# Patient Record
Sex: FEMALE | Race: WHITE | Employment: OTHER | URBAN - METROPOLITAN AREA
[De-identification: names, ages, dates, MRNs, and addresses within clinical notes are randomized per-mention and may not be internally consistent; named-entity substitution may affect disease eponyms.]

---

## 2018-03-26 ENCOUNTER — HOSPITAL ENCOUNTER (OUTPATIENT)
Dept: SURGERY | Age: 68
Discharge: HOME | End: 2018-03-26
Payer: MEDICARE

## 2018-03-26 ENCOUNTER — HOSPITAL ENCOUNTER (OUTPATIENT)
Dept: PHYSICAL THERAPY | Age: 68
Discharge: HOME | End: 2018-03-26
Payer: MEDICARE

## 2018-03-26 DIAGNOSIS — R06.83 SNORING: Primary | ICD-10-CM

## 2018-03-26 LAB
ANION GAP SERPL CALC-SCNC: 13 MMOL/L (ref 7–16)
APPEARANCE UR: CLEAR
APTT PPP: 28 SEC (ref 23.2–35.3)
ATRIAL RATE: 57 BPM
BACTERIA SPEC CULT: NORMAL
BASOPHILS # BLD: 0 K/UL (ref 0–0.2)
BASOPHILS NFR BLD: 0 % (ref 0–2)
BILIRUB UR QL: NEGATIVE
BUN SERPL-MCNC: 11 MG/DL (ref 8–23)
CALCIUM SERPL-MCNC: 9.7 MG/DL (ref 8.3–10.4)
CALCULATED P AXIS, ECG09: 41 DEGREES
CALCULATED R AXIS, ECG10: 41 DEGREES
CALCULATED T AXIS, ECG11: 15 DEGREES
CHLORIDE SERPL-SCNC: 107 MMOL/L (ref 98–107)
CO2 SERPL-SCNC: 24 MMOL/L (ref 21–32)
COLOR UR: YELLOW
CREAT SERPL-MCNC: 0.75 MG/DL (ref 0.6–1)
DIAGNOSIS, 93000: NORMAL
DIFFERENTIAL METHOD BLD: ABNORMAL
EOSINOPHIL # BLD: 0.1 K/UL (ref 0–0.8)
EOSINOPHIL NFR BLD: 1 % (ref 0.5–7.8)
ERYTHROCYTE [DISTWIDTH] IN BLOOD BY AUTOMATED COUNT: 12.9 % (ref 11.9–14.6)
GLUCOSE SERPL-MCNC: 84 MG/DL (ref 65–100)
GLUCOSE UR STRIP.AUTO-MCNC: NEGATIVE MG/DL
HCT VFR BLD AUTO: 39.2 % (ref 35.8–46.3)
HGB BLD-MCNC: 13 G/DL (ref 11.7–15.4)
HGB UR QL STRIP: NEGATIVE
IMM GRANULOCYTES # BLD: 0 K/UL (ref 0–0.5)
IMM GRANULOCYTES NFR BLD AUTO: 0 % (ref 0–5)
INR PPP: 0.9
KETONES UR QL STRIP.AUTO: NEGATIVE MG/DL
LEUKOCYTE ESTERASE UR QL STRIP.AUTO: NEGATIVE
LYMPHOCYTES # BLD: 1.5 K/UL (ref 0.5–4.6)
LYMPHOCYTES NFR BLD: 33 % (ref 13–44)
MCH RBC QN AUTO: 29.4 PG (ref 26.1–32.9)
MCHC RBC AUTO-ENTMCNC: 33.2 G/DL (ref 31.4–35)
MCV RBC AUTO: 88.7 FL (ref 79.6–97.8)
MONOCYTES # BLD: 0.3 K/UL (ref 0.1–1.3)
MONOCYTES NFR BLD: 7 % (ref 4–12)
NEUTS SEG # BLD: 2.6 K/UL (ref 1.7–8.2)
NEUTS SEG NFR BLD: 59 % (ref 43–78)
NITRITE UR QL STRIP.AUTO: NEGATIVE
P-R INTERVAL, ECG05: 190 MS
PH UR STRIP: 5.5 [PH] (ref 5–9)
PLATELET # BLD AUTO: 202 K/UL (ref 150–450)
PMV BLD AUTO: 10.3 FL (ref 10.8–14.1)
POTASSIUM SERPL-SCNC: 3.8 MMOL/L (ref 3.5–5.1)
PROT UR STRIP-MCNC: NEGATIVE MG/DL
PROTHROMBIN TIME: 12.5 SEC (ref 11.5–14.5)
Q-T INTERVAL, ECG07: 410 MS
QRS DURATION, ECG06: 74 MS
QTC CALCULATION (BEZET), ECG08: 399 MS
RBC # BLD AUTO: 4.42 M/UL (ref 4.05–5.25)
SERVICE CMNT-IMP: NORMAL
SODIUM SERPL-SCNC: 144 MMOL/L (ref 136–145)
SP GR UR REFRACTOMETRY: 1 (ref 1–1.02)
UROBILINOGEN UR QL STRIP.AUTO: 0.2 EU/DL (ref 0.2–1)
VENTRICULAR RATE, ECG03: 57 BPM
WBC # BLD AUTO: 4.5 K/UL (ref 4.3–11.1)

## 2018-03-26 PROCEDURE — 80048 BASIC METABOLIC PNL TOTAL CA: CPT | Performed by: PHYSICIAN ASSISTANT

## 2018-03-26 PROCEDURE — 97161 PT EVAL LOW COMPLEX 20 MIN: CPT

## 2018-03-26 PROCEDURE — 77030027138 HC INCENT SPIROMETER -A

## 2018-03-26 PROCEDURE — G8978 MOBILITY CURRENT STATUS: HCPCS

## 2018-03-26 PROCEDURE — 93005 ELECTROCARDIOGRAM TRACING: CPT | Performed by: ANESTHESIOLOGY

## 2018-03-26 PROCEDURE — 36415 COLL VENOUS BLD VENIPUNCTURE: CPT | Performed by: PHYSICIAN ASSISTANT

## 2018-03-26 PROCEDURE — 85610 PROTHROMBIN TIME: CPT | Performed by: PHYSICIAN ASSISTANT

## 2018-03-26 PROCEDURE — G8979 MOBILITY GOAL STATUS: HCPCS

## 2018-03-26 PROCEDURE — G8980 MOBILITY D/C STATUS: HCPCS

## 2018-03-26 PROCEDURE — 87641 MR-STAPH DNA AMP PROBE: CPT | Performed by: PHYSICIAN ASSISTANT

## 2018-03-26 PROCEDURE — 81003 URINALYSIS AUTO W/O SCOPE: CPT | Performed by: PHYSICIAN ASSISTANT

## 2018-03-26 PROCEDURE — 85025 COMPLETE CBC W/AUTO DIFF WBC: CPT | Performed by: PHYSICIAN ASSISTANT

## 2018-03-26 PROCEDURE — 85730 THROMBOPLASTIN TIME PARTIAL: CPT | Performed by: PHYSICIAN ASSISTANT

## 2018-03-26 RX ORDER — ERGOCALCIFEROL 1.25 MG/1
50000 CAPSULE ORAL
COMMUNITY
End: 2018-06-22 | Stop reason: CLARIF

## 2018-03-26 RX ORDER — FERROUS SULFATE, DRIED 160(50) MG
1 TABLET, EXTENDED RELEASE ORAL DAILY
COMMUNITY

## 2018-03-26 RX ORDER — ASPIRIN 81 MG/1
81 TABLET ORAL DAILY
COMMUNITY
End: 2018-04-16

## 2018-03-26 RX ORDER — LANOLIN ALCOHOL/MO/W.PET/CERES
400 CREAM (GRAM) TOPICAL DAILY
COMMUNITY
End: 2018-06-22 | Stop reason: CLARIF

## 2018-03-26 RX ORDER — TRAMADOL HYDROCHLORIDE 50 MG/1
50 TABLET ORAL
COMMUNITY
Start: 2018-02-19 | End: 2018-04-16

## 2018-03-26 NOTE — PERIOP NOTES
Recent Results (from the past 12 hour(s))   CBC WITH AUTOMATED DIFF    Collection Time: 03/26/18  9:10 AM   Result Value Ref Range    WBC 4.5 4.3 - 11.1 K/uL    RBC 4.42 4.05 - 5.25 M/uL    HGB 13.0 11.7 - 15.4 g/dL    HCT 39.2 35.8 - 46.3 %    MCV 88.7 79.6 - 97.8 FL    MCH 29.4 26.1 - 32.9 PG    MCHC 33.2 31.4 - 35.0 g/dL    RDW 12.9 11.9 - 14.6 %    PLATELET 630 170 - 868 K/uL    MPV 10.3 (L) 10.8 - 14.1 FL    DF AUTOMATED      NEUTROPHILS 59 43 - 78 %    LYMPHOCYTES 33 13 - 44 %    MONOCYTES 7 4.0 - 12.0 %    EOSINOPHILS 1 0.5 - 7.8 %    BASOPHILS 0 0.0 - 2.0 %    IMMATURE GRANULOCYTES 0 0.0 - 5.0 %    ABS. NEUTROPHILS 2.6 1.7 - 8.2 K/UL    ABS. LYMPHOCYTES 1.5 0.5 - 4.6 K/UL    ABS. MONOCYTES 0.3 0.1 - 1.3 K/UL    ABS. EOSINOPHILS 0.1 0.0 - 0.8 K/UL    ABS. BASOPHILS 0.0 0.0 - 0.2 K/UL    ABS. IMM.  GRANS. 0.0 0.0 - 0.5 K/UL   PROTHROMBIN TIME + INR    Collection Time: 03/26/18  9:10 AM   Result Value Ref Range    Prothrombin time 12.5 11.5 - 14.5 sec    INR 0.9     PTT    Collection Time: 03/26/18  9:10 AM   Result Value Ref Range    aPTT 28.0 23.2 - 64.6 SEC   METABOLIC PANEL, BASIC    Collection Time: 03/26/18  9:10 AM   Result Value Ref Range    Sodium 144 136 - 145 mmol/L    Potassium 3.8 3.5 - 5.1 mmol/L    Chloride 107 98 - 107 mmol/L    CO2 24 21 - 32 mmol/L    Anion gap 13 7 - 16 mmol/L    Glucose 84 65 - 100 mg/dL    BUN 11 8 - 23 MG/DL    Creatinine 0.75 0.6 - 1.0 MG/DL    GFR est AA >60 >60 ml/min/1.73m2    GFR est non-AA >60 >60 ml/min/1.73m2    Calcium 9.7 8.3 - 10.4 MG/DL   URINALYSIS W/ RFLX MICROSCOPIC    Collection Time: 03/26/18  9:10 AM   Result Value Ref Range    Color YELLOW      Appearance CLEAR      Specific gravity 1.005 1.001 - 1.023      pH (UA) 5.5 5.0 - 9.0      Protein NEGATIVE  NEG mg/dL    Glucose NEGATIVE  mg/dL    Ketone NEGATIVE  NEG mg/dL    Bilirubin NEGATIVE  NEG      Blood NEGATIVE  NEG      Urobilinogen 0.2 0.2 - 1.0 EU/dL    Nitrites NEGATIVE  NEG      Leukocyte Esterase NEGATIVE  NEG

## 2018-03-26 NOTE — ADVANCED PRACTICE NURSE
Total Joint Surgery Preoperative Chart Review      Patient ID:  Jennifer Tavarez  [id number]  78 y.o.  1950  Surgeon: Dr. Yousif Sin  Date of Surgery: 4/13/2018  Procedure: Total Right Knee Arthroplasty  Primary Care Physician: Nona Farrell -[phone]  Specialty Physician(s):      Subjective:   Jennifer Tavarez is a 76 y.o. WHITE OR  female who presents for preoperative evaluation for Total Right Knee arthroplasty. This is a preoperative chart review note based on data collected by the nurse at the surgical Pre-Assessment visit. Past Medical History:   Diagnosis Date    Environmental and seasonal allergies     GERD (gastroesophageal reflux disease)     diet controlled     Neuropathy     bilateral lower extremities     Osteoporosis     Primary osteoarthritis of right knee       Past Surgical History:   Procedure Laterality Date    HX COLONOSCOPY  2012    HX TONSILLECTOMY  1962    HX TUBAL LIGATION  1978     Family History   Problem Relation Age of Onset    Alzheimer Mother     Cancer Father       Social History   Substance Use Topics    Smoking status: Never Smoker    Smokeless tobacco: Never Used    Alcohol use No       Prior to Admission medications    Medication Sig Start Date End Date Taking? Authorizing Provider   ergocalciferol (ERGOCALCIFEROL) 50,000 unit capsule Take 50,000 Units by mouth. Twice a week   Yes Historical Provider   aspirin delayed-release 81 mg tablet Take 81 mg by mouth daily. Yes Historical Provider   traMADol (ULTRAM) 50 mg tablet Take 50 mg by mouth every six (6) hours as needed. 2/19/18   Historical Provider   calcium-vitamin D (OYSTER SHELL) 500 mg(1,250mg) -200 unit per tablet Take 1 Tab by mouth daily. Historical Provider   magnesium oxide (MAG-OX) 400 mg tablet Take 400 mg by mouth daily.     Historical Provider     No Known Allergies       Objective:     Physical Exam:   Patient Vitals for the past 24 hrs:   Temp Pulse Resp BP SpO2   03/26/18 1048 96.8 °F (36 °C) 66 18 142/73 100 %       ECG:    EKG Results     Procedure 720 Value Units Date/Time    EKG, 12 LEAD, INITIAL [[id number]] Collected:  03/26/18 0934    Order Status:  Completed Updated:  03/26/18 1104     Ventricular Rate 57 BPM      Atrial Rate 57 BPM      P-R Interval 190 ms      QRS Duration 74 ms      Q-T Interval 410 ms      QTC Calculation (Bezet) 399 ms      Calculated P Axis 41 degrees      Calculated R Axis 41 degrees      Calculated T Axis 15 degrees      Diagnosis --     Sinus bradycardia with Fusion complexes  Otherwise normal ECG  No previous ECGs available            Data Review:   Labs:   Recent Results (from the past 24 hour(s))   CBC WITH AUTOMATED DIFF    Collection Time: 03/26/18  9:10 AM   Result Value Ref Range    WBC 4.5 4.3 - 11.1 K/uL    RBC 4.42 4.05 - 5.25 M/uL    HGB 13.0 11.7 - 15.4 g/dL    HCT 39.2 35.8 - 46.3 %    MCV 88.7 79.6 - 97.8 FL    MCH 29.4 26.1 - 32.9 PG    MCHC 33.2 31.4 - 35.0 g/dL    RDW 12.9 11.9 - 14.6 %    PLATELET 616 343 - 543 K/uL    MPV 10.3 (L) 10.8 - 14.1 FL    DF AUTOMATED      NEUTROPHILS 59 43 - 78 %    LYMPHOCYTES 33 13 - 44 %    MONOCYTES 7 4.0 - 12.0 %    EOSINOPHILS 1 0.5 - 7.8 %    BASOPHILS 0 0.0 - 2.0 %    IMMATURE GRANULOCYTES 0 0.0 - 5.0 %    ABS. NEUTROPHILS 2.6 1.7 - 8.2 K/UL    ABS. LYMPHOCYTES 1.5 0.5 - 4.6 K/UL    ABS. MONOCYTES 0.3 0.1 - 1.3 K/UL    ABS. EOSINOPHILS 0.1 0.0 - 0.8 K/UL    ABS. BASOPHILS 0.0 0.0 - 0.2 K/UL    ABS. IMM.  GRANS. 0.0 0.0 - 0.5 K/UL   PROTHROMBIN TIME + INR    Collection Time: 03/26/18  9:10 AM   Result Value Ref Range    Prothrombin time 12.5 11.5 - 14.5 sec    INR 0.9     PTT    Collection Time: 03/26/18  9:10 AM   Result Value Ref Range    aPTT 28.0 23.2 - 37.6 SEC   METABOLIC PANEL, BASIC    Collection Time: 03/26/18  9:10 AM   Result Value Ref Range    Sodium 144 136 - 145 mmol/L    Potassium 3.8 3.5 - 5.1 mmol/L    Chloride 107 98 - 107 mmol/L    CO2 24 21 - 32 mmol/L    Anion gap 13 7 - 16 mmol/L    Glucose 84 65 - 100 mg/dL    BUN 11 8 - 23 MG/DL    Creatinine 0.75 0.6 - 1.0 MG/DL    GFR est AA >60 >60 ml/min/1.73m2    GFR est non-AA >60 >60 ml/min/1.73m2    Calcium 9.7 8.3 - 10.4 MG/DL   URINALYSIS W/ RFLX MICROSCOPIC    Collection Time: 03/26/18  9:10 AM   Result Value Ref Range    Color YELLOW      Appearance CLEAR      Specific gravity 1.005 1.001 - 1.023      pH (UA) 5.5 5.0 - 9.0      Protein NEGATIVE  NEG mg/dL    Glucose NEGATIVE  mg/dL    Ketone NEGATIVE  NEG mg/dL    Bilirubin NEGATIVE  NEG      Blood NEGATIVE  NEG      Urobilinogen 0.2 0.2 - 1.0 EU/dL    Nitrites NEGATIVE  NEG      Leukocyte Esterase NEGATIVE  NEG     EKG, 12 LEAD, INITIAL    Collection Time: 03/26/18  9:34 AM   Result Value Ref Range    Ventricular Rate 57 BPM    Atrial Rate 57 BPM    P-R Interval 190 ms    QRS Duration 74 ms    Q-T Interval 410 ms    QTC Calculation (Bezet) 399 ms    Calculated P Axis 41 degrees    Calculated R Axis 41 degrees    Calculated T Axis 15 degrees    Diagnosis       Sinus bradycardia with Fusion complexes  Otherwise normal ECG  No previous ECGs available           Problem List:  )  Patient Active Problem List   Diagnosis Code    Snoring R06.83       Total Joint Surgery Pre-Assessment Recommendations:           Patient with multiple comorbidities including:  Advanced age of 76 with severe neuorpathy of bilateral lower extremities. Patient with poor home support and wishes to discharge to skilled nursing. Patient would benefit from inpatient hospitalization with total knee surgery. Patient reports the symptoms of snoring, observed apnea and /or excessive daytime sleepiness. Will refer patient for HST based on above assessment.          Signed By: BROOKE Poon    March 26, 2018

## 2018-03-26 NOTE — PERIOP NOTES
Labs dated 3/26/18 routed via [address] [facility] to patients PCP, Dr. Thanh Jackson, per Dr. Vladimir Mcnamara' request.

## 2018-03-26 NOTE — PERIOP NOTES
Patient verified name, , and surgery as listed in [facility]. Type 3 surgery, PAT joint assessment complete. Labs per surgeon: cbc, bmp, ua, pt/inr, ptt, mrsa/mssa swab, T&S DOS; results within anesthesia limits. Labs per anesthesia protocol: All required lab work included in surgeon's orders. EKG: completed 3/26/18; results within anesthesia limits. Hibiclens and instructions to return bottle on DOS given per hospital policy. Nasal Swab collected per MD order and instructions for Mupirocin nasal ointment if required. Patient provided with handouts including Guide to Surgery, Pain Management, Hand Hygiene, Blood Transfusion Education, and [facility] Brochure. Patient answered medical/surgical history questions at their best of ability. All prior to admission medications documented in [facility]. Original medication prescription bottle NOT visualized during patient appointment. Patient instructed to hold all vitamins 7 days prior to surgery and NSAIDS 5 days prior to surgery. Medications to be held: all vitamins/supplements/herbals. Patient instructed to continue previous medications as prescribed prior to surgery and to take the following medications the day of surgery according to anesthesia guidelines with a small sip of water: Tramadol if needed and 81 mg ASA. Patient instructed to bring bottle of Hibiclens and incentive spirometer to the hospital on the DOS. Patient teach back successful and patient demonstrates knowledge of instruction.

## 2018-03-26 NOTE — PROGRESS NOTES
Isabel Mccoy  : (89 y.o.) [address] at [place] [address], [address] [facility]  [phone]       Physical Therapy Prehab Plan of Treatment and Evaluation Summary:3/26/2018    ICD-10: Treatment Diagnosis:   · Pain in Right Knee (M25.561)  · Stiffness of Right Knee, Not elsewhere classified (M25.661)  · Difficulty in walking, Not elsewhere classified (R26.2)  Precautions/Allergies:   Review of patient's allergies indicates no known allergies. MEDICAL/REFERRING DIAGNOSIS:  Unilateral primary osteoarthritis, right knee [M17.11]  REFERRING PHYSICIAN: Tristan Nava., *  DATE OF SURGERY: 18   Assessment:   Comments:  Pt. Plans to go to snf. She was give snf sheet to call facility. She is aware MD wants her to go home but she is concerned about her  being able to assist her as she reports he is weak from health issues. PROBLEM LIST (Impacting functional limitations):  Ms. Arti Noel presents with the following right lower extremity(s) problems:  1. Strength  2. Range of Motion  3. Home Exercise Program  4. Pain   INTERVENTIONS PLANNED:  1. Home Exercise Program  2. Educational Discussion     TREATMENT PLAN: Effective Dates: 3/26/2018 TO 3/26/2018. Frequency/Duration: Patient to continue to perform home exercise program at least twice per day up until her surgery. GOALS: (Goals have been discussed and agreed upon with patient.)  Discharge Goals: Time Frame: 1 Day  1. Patient will demonstrate independence with a home exercise program designed to increase strength, range of motion and pain control to minimize functional deficits and optimize patient for total joint replacement. Rehabilitation Potential For Stated Goals: Good  Regarding Israel Bauer therapy, I certify that the treatment plan above will be carried out by a therapist or under their direction.   Thank you for this referral,  Mamta Goode PT               HISTORY:   Present Symptoms:  Pain Intensity 1:  (7 at worst)  Pain Location 1: Knee   History of Present Injury/Illness (Reason for Referral):  Medical/Referring Diagnosis: Unilateral primary osteoarthritis, right knee [M17.11]   Past Medical History/Comorbidities:   Ms. Marco A Esparza  has a past medical history of Environmental and seasonal allergies; GERD (gastroesophageal reflux disease); Neuropathy; Osteoporosis; and Primary osteoarthritis of right knee. Ms. Marco A Esparza  has a past surgical history that includes hx tonsillectomy (1962); hx colonoscopy (2012); and hx tubal ligation (1978). Social History/Living Environment:   Home Environment: Private residence  # Steps to Enter: 3  Rails to Enter: No  One/Two Story Residence: One story  Living Alone: No  Support Systems: Spouse/Significant Other/Partner  Patient Expects to be Discharged to[de-identified] Skilled nursing facility  Current DME Used/Available at Home: Shower chair, Crutches, Grab bars  Tub or Shower Type: Shower  Work/Activity:  retired  Dominant Side:  RIGHT  Current Medications:  See [address] note   Number of Personal Factors/Comorbidities that affect the Plan of Care: 1-2: MODERATE COMPLEXITY   EXAMINATION:   ADLs (Current Functional Status):   Ambulation:  [x] Independent  [] Walk Indoors Only  [] Walk Outdoors  [] Use Assistive Device  [] Use Wheelchair Only Dressing:  [x] Independent  Requires Assistance from Someone for:  [] Sock/Shoes  [] Pants  [] Everything   Bathing/Showering:   [x] Independent  [] Requires Assistance from Someone  [] [address]:  [x] Routine house and yard work  [] Light Housework Only  [] None   Observation/Orthostatic Postural Assessment:       ROM/Flexibility:   Gross Assessment: Yes  AROM: Within functional limits (left LE)                       RLE Assessment  RLE Assessment (WDL): Exceptions to WDL  RLE AROM  R Knee Flexion: 100  R Knee Extension: 10   Strength:   Gross Assessment: Yes  Strength:  Within functional limits (left LE)              RLE Strength  R Knee Flexion: 4  R Knee Extension: 4   Functional Mobility:    Gross Assessment: Yes    Gait Description (WDL): Exceptions to WDL  Stand to Sit: Independent, Additional time  Sit to Stand: Independent, Additional time  Distance (ft): 250 Feet (ft)  Ambulation - Level of Assistance: Independent  Speed/Michela: Delayed  Stance: Right decreased  Gait Abnormalities: Antalgic          Balance:    Sitting: Intact  Standing: Intact   Body Structures Involved:  1. Bones  2. Joints  3. Muscles  4. Ligaments Body Functions Affected:  1. Movement Related Activities and Participation Affected:  1. Mobility   Number of elements that affect the Plan of Care: 3: MODERATE COMPLEXITY   CLINICAL PRESENTATION:   Presentation: Stable and uncomplicated: LOW COMPLEXITY   CLINICAL DECISION MAKING:   Outcome Measure: Tool Used: Lower Extremity Functional Scale (LEFS)  Score:  Initial: 38/80 Most Recent: X/80 (Date: -- )   Interpretation of Score: 20 questions each scored on a 5 point scale with 0 representing \"extreme difficulty or unable to perform\" and 4 representing \"no difficulty\". The lower the score, the greater the functional disability. 80/80 represents no disability. Minimal detectable change is 9 points. Score 80 79-65 64-49 48-33 32-17 16-1 0   Modifier CH CI CJ CK CL CM CN     ? Mobility - Walking and Moving Around:     - CURRENT STATUS: CK - 40%-59% impaired, limited or restricted    - GOAL STATUS: CK - 40%-59% impaired, limited or restricted    - D/C STATUS:  CK - 40%-59% impaired, limited or restricted  Medical Necessity:   · Ms. [place] Patel is expected to optimize her lower extremity strength and ROM in preparation for joint replacement surgery. Reason for Services/Other Comments:  · Achieve baseline assesment of musculoskeletal system, functional mobility and home environment. , educate in PT HEP in preparation for surgery, educate in hospital plan of care.    Use of outcome tool(s) and clinical judgement create a POC that gives a: Clear prediction of patient's progress: LOW COMPLEXITY   TREATMENT:   Treatment/Session Assessment:  Patient was instructed in PT- HEP to increase strength and ROM in LEs. Answered all questions. · Post session pain:  No complaints  · Compliance with Program/Exercises: compliant most of the time.   Total Treatment Duration:  PT Patient Time In/Time Out  Time In: 0900  Time Out: [address] [facility],

## 2018-03-27 VITALS
RESPIRATION RATE: 18 BRPM | OXYGEN SATURATION: 100 % | DIASTOLIC BLOOD PRESSURE: 73 MMHG | HEIGHT: 63 IN | WEIGHT: 143 LBS | SYSTOLIC BLOOD PRESSURE: 142 MMHG | TEMPERATURE: 96.8 F | HEART RATE: 66 BPM | BODY MASS INDEX: 25.34 KG/M2

## 2018-03-27 NOTE — PROGRESS NOTES
03/26/18 0900   Oxygen Therapy   O2 Sat (%) 97 %   Pulse via Oximetry 81 beats per minute   O2 Device Room air   Pre-Treatment   Breath Sounds Bilateral Clear   Pre FEV1 (liters) 1.7 liters   % Predicted 74   Incentive Spirometry Treatment   Actual Volume (ml) 1500 ml   Sleep Disorder Breathing Screen:     Patient reports symptoms of:   · Snoring \"like a freight train\" according to   · Excessive daytime sleepiness   · SACS 3  · Waking from sleep snoring  · STOP-BANG _3___  · Crane Score _12___  · Height__5'3\"___ Weight__143 lbs___   PT'S SON DIAGNOSED WITH ELIZABETH    Sleepiness Scale:     Sitting and reading 1    Watching TV 2    Sitting inactive in a public place 2    As a passenger in a car for an hour without a break 2    Lying down to rest in the afternoon when circumstances Permits 2    Sitting and talking to someone 1    Sitting quietly after lunch without alcohol 2    In a car, while stopped for a few minutes 0    Total :  12    Refer patient for sleep study based on above assessment. Initial respiratory Assessment completed with pt. Pt was interviewed and evaluated in Joint camp prior to surgery. Patient ID:  Jenae Garrido  [id number]  61 y.o.  1950  Surgeon: Dr. Lizbeth Reese  Date of Surgery: 4/13/2018  Procedure: Total Right Knee Arthroplasty  Primary Care Physician: Navdeep Concepcion -[phone]  Specialists:                                  Pt instructed in the use of Incentive Spirometry. Pt instructed to bring Incentive Spirometer back on date of surgery & to start using Is upon return to pt room.     Pt taught proper cough technique    History of smoking:   NONE                                                                      Secondhand smoke:NONE      Past procedures with Oxygen desaturation:NONE    Past Medical History:   Diagnosis Date    Environmental and seasonal allergies     GERD (gastroesophageal reflux disease)     diet controlled     Neuropathy     bilateral lower extremities     Osteoporosis     Primary osteoarthritis of right knee                                                                                                                                   HX OF PNA ONCE  Respiratory history:DENIES SOB                                                              Respiratory meds:                                         FAMILY PRESENT:                                                          NO                                        PAST SLEEP STUDY:                        NO  HX OF ELIZABETH:                                            NO                                     ELIZABETH assessment:                                               SLEEPS ON SIDE                                                               PHYSICAL EXAM   Body mass index is 25.33 kg/(m^2). Visit Vitals    /73 (BP 1 Location: Left arm, BP Patient Position: At rest;Sitting)    Pulse 66    Temp 96.8 °F (36 °C)    Resp 18    Ht 5' 3\" (1.6 m)    Wt 64.9 kg (143 lb)    SpO2 100%    BMI 25.33 kg/m2     Neck circumference:35      cm    Loud snoring:        YES                              Witnessed apnea or wakening gasping or choking:,             DENIES,                                                                                                    Awakens with headaches:                                                  DENIES    Morning or daytime tiredness/ sleepiness:                                                                                                            TIRED   Dry mouth or sore throat in morning:                YES                                                                          [place] stage:  4    SACS score:3  STOP/BANG:3                              CPAP:                       NONE                                                     CONT SAT HS              Referrals:  HST  Pt.  Phone Number:  [phone]

## 2018-04-10 NOTE — H&P
[address] [facility]  Pre Operative History and Physical Exam    Patient ID:  Sade Ely  [id number]  44 y.o.  1950    Today: April 10, 2018       Assessment:   1. Arthritis of the right knee        Plan:    1. Proceed with scheduled Procedure(s) (LRB):  RIGHT KNEE ARTHROPLASTY TOTAL / [place] / FNB (Right)            CC:  Right knee pain    HPI:   The patient has end stage arthritis of the right knee. The patient was evaluated and examined during a consultation prior to this office visit. There have been no changes to the patient's orthopedic condition since the initial consultation. The patient has failed previous conservative treatment for this condition including antiinflammatories , and lifestyle modifications. The necessity for joint replacement is present. The patient will be admitted the day of surgery for Procedure(s) (LRB):  RIGHT KNEE ARTHROPLASTY TOTAL / David Olden / FNB (Right)      Past Medical/Surgical History:  Past Medical History:   Diagnosis Date    Environmental and seasonal allergies     GERD (gastroesophageal reflux disease)     diet controlled     Neuropathy     bilateral lower extremities     Osteoporosis     Primary osteoarthritis of right knee      Past Surgical History:   Procedure Laterality Date    HX COLONOSCOPY  2012    HX TONSILLECTOMY  1962    HX TUBAL LIGATION  1978        Allergies: No Known Allergies     Physical Exam:   General: NAD, Alert, Oriented, Appears their stated age     [de-identified]: NC/AT, PERRL    Skin: No rashes, lesions or wounds seen      Psych: normal affect      Heart: Regular Rate, Rhythm     Lungs: unlabored respirations, normal breath sounds     Abdomen: Soft and non-distended     Ortho: Pain with limited ROM of the right knee    Neuro: no focal defects, sensation is equal bilaterally     Lymph: no lymphadenopathy     Meds:   No current facility-administered medications for this encounter.       Current Outpatient Prescriptions   Medication Sig    traMADol (ULTRAM) 50 mg tablet Take 50 mg by mouth every six (6) hours as needed.  ergocalciferol (ERGOCALCIFEROL) 50,000 unit capsule Take 50,000 Units by mouth. Twice a week    calcium-vitamin D (OYSTER SHELL) 500 mg(1,250mg) -200 unit per tablet Take 1 Tab by mouth daily.  magnesium oxide (MAG-OX) 400 mg tablet Take 400 mg by mouth daily.  aspirin delayed-release 81 mg tablet Take 81 mg by mouth daily. Labs:  Hospital Outpatient Visit on 03/26/2018   Component Date Value Ref Range Status    WBC 03/26/2018 4.5  4.3 - 11.1 K/uL Final    RBC 03/26/2018 4.42  4.05 - 5.25 M/uL Final    HGB 03/26/2018 13.0  11.7 - 15.4 g/dL Final    HCT 03/26/2018 39.2  35.8 - 46.3 % Final    MCV 03/26/2018 88.7  79.6 - 97.8 FL Final    MCH 03/26/2018 29.4  26.1 - 32.9 PG Final    MCHC 03/26/2018 33.2  31.4 - 35.0 g/dL Final    RDW 03/26/2018 12.9  11.9 - 14.6 % Final    PLATELET 75/23/2688 081  150 - 450 K/uL Final    MPV 03/26/2018 10.3* 10.8 - 14.1 FL Final    DF 03/26/2018 AUTOMATED    Final    NEUTROPHILS 03/26/2018 59  43 - 78 % Final    LYMPHOCYTES 03/26/2018 33  13 - 44 % Final    MONOCYTES 03/26/2018 7  4.0 - 12.0 % Final    EOSINOPHILS 03/26/2018 1  0.5 - 7.8 % Final    BASOPHILS 03/26/2018 0  0.0 - 2.0 % Final    IMMATURE GRANULOCYTES 03/26/2018 0  0.0 - 5.0 % Final    ABS. NEUTROPHILS 03/26/2018 2.6  1.7 - 8.2 K/UL Final    ABS. LYMPHOCYTES 03/26/2018 1.5  0.5 - 4.6 K/UL Final    ABS. MONOCYTES 03/26/2018 0.3  0.1 - 1.3 K/UL Final    ABS. EOSINOPHILS 03/26/2018 0.1  0.0 - 0.8 K/UL Final    ABS. BASOPHILS 03/26/2018 0.0  0.0 - 0.2 K/UL Final    ABS. IMM.  GRANS. 03/26/2018 0.0  0.0 - 0.5 K/UL Final    Prothrombin time 03/26/2018 12.5  11.5 - 14.5 sec Final    ** Note new reference range and method **    INR 03/26/2018 0.9    Final    Comment: Suggested therapeutic INR range:  Venous thrombosis and embolus  2.0-3.0  Prosthetic heart valve         2.5-3.5  ** Note new reference range and method **      aPTT 03/26/2018 28.0  23.2 - 35.3 SEC Final    Comment: Heparin Therapeutic Range = 74 - 123 seconds  In addition to factor deficiency, monitoring heparin therapy, etc., evaluation of a prolonged aPTT result should include consideration of preanalytic variables such as heparin flush contamination, specimen integrity issues, etc.  ** Note new reference range and method **      Sodium 03/26/2018 144  136 - 145 mmol/L Final    Potassium 03/26/2018 3.8  3.5 - 5.1 mmol/L Final    Chloride 03/26/2018 107  98 - 107 mmol/L Final    CO2 03/26/2018 24  21 - 32 mmol/L Final    Anion gap 03/26/2018 13  7 - 16 mmol/L Final    Glucose 03/26/2018 84  65 - 100 mg/dL Final    Comment: 47 - 60 mg/dl Consistent with, but not fully diagnostic of hypoglycemia. 101 - 125 mg/dl Impaired fasting glucose/consistent with pre-diabetes mellitus  > 126 mg/dl Fasting glucose consistent with overt diabetes mellitus      BUN 03/26/2018 11  8 - 23 MG/DL Final    Creatinine 03/26/2018 0.75  0.6 - 1.0 MG/DL Final    GFR est AA 03/26/2018 >60  >60 ml/min/1.73m2 Final    GFR est non-AA 03/26/2018 >60  >60 ml/min/1.73m2 Final    Comment: (NOTE)  Estimated GFR is calculated using the Modification of Diet in Renal   Disease (MDRD) Study equation, reported for both  Americans   (GFRAA) and non- Americans (GFRNA), and normalized to 1.73m2   body surface area. The physician must decide which value applies to   the patient. The MDRD study equation should only be used in   individuals age 25 or older. It has not been validated for the   following: pregnant women, patients with serious comorbid conditions,   or on certain medications, or persons with extremes of body size,   muscle mass, or nutritional status.       Calcium 03/26/2018 9.7  8.3 - 10.4 MG/DL Final    Color 03/26/2018 YELLOW    Final    Appearance 03/26/2018 CLEAR    Final    Specific gravity 03/26/2018 1.005  1.001 - 1.023   Final    pH (UA) 03/26/2018 5.5  5.0 - 9.0   Final    Protein 03/26/2018 NEGATIVE   NEG mg/dL Final    Glucose 03/26/2018 NEGATIVE   mg/dL Final    Ketone 03/26/2018 NEGATIVE   NEG mg/dL Final    Bilirubin 03/26/2018 NEGATIVE   NEG   Final    Blood 03/26/2018 NEGATIVE   NEG   Final    Urobilinogen 03/26/2018 0.2  0.2 - 1.0 EU/dL Final    Nitrites 03/26/2018 NEGATIVE   NEG   Final    Leukocyte Esterase 03/26/2018 NEGATIVE   NEG   Final    Special Requests: 03/26/2018 NO SPECIAL REQUESTS    Final    Culture result: 03/26/2018 SA target not detected. A MRSA NEGATIVE, SA NEGATIVE test result does not preclude MRSA or SA nasal colonization.     Final    Ventricular Rate 03/26/2018 57  BPM Final    Atrial Rate 03/26/2018 57  BPM Final    P-R Interval 03/26/2018 190  ms Final    QRS Duration 03/26/2018 74  ms Final    Q-T Interval 03/26/2018 410  ms Final    QTC Calculation (Bezet) 03/26/2018 399  ms Final    Calculated P Axis 03/26/2018 41  degrees Final    Calculated R Axis 03/26/2018 41  degrees Final    Calculated T Axis 03/26/2018 15  degrees Final    Diagnosis 03/26/2018    Final                    Value:Sinus bradycardia with Fusion complexes  Otherwise normal ECG  No previous ECGs available  Confirmed by Lydia Moran MD (), GARETT THOMAS ([id number]) on 3/26/2018 12:17:12 PM                   Patient Active Problem List   Diagnosis Code    Snoring R06.83         Signed By: ADRIANA Gant  April 10, 2018

## 2018-04-12 ENCOUNTER — ANESTHESIA EVENT (OUTPATIENT)
Dept: SURGERY | Age: 68
DRG: 470 | End: 2018-04-12
Payer: MEDICARE

## 2018-04-12 RX ORDER — SODIUM CHLORIDE, SODIUM LACTATE, POTASSIUM CHLORIDE, CALCIUM CHLORIDE 600; 310; 30; 20 MG/100ML; MG/100ML; MG/100ML; MG/100ML
75 INJECTION, SOLUTION INTRAVENOUS CONTINUOUS
Status: CANCELLED | OUTPATIENT
Start: 2018-04-12

## 2018-04-12 RX ORDER — HYDROMORPHONE HYDROCHLORIDE 2 MG/ML
0.5 INJECTION, SOLUTION INTRAMUSCULAR; INTRAVENOUS; SUBCUTANEOUS
Status: CANCELLED | OUTPATIENT
Start: 2018-04-12

## 2018-04-12 RX ORDER — OXYCODONE HYDROCHLORIDE 5 MG/1
5 TABLET ORAL
Status: CANCELLED | OUTPATIENT
Start: 2018-04-12

## 2018-04-12 RX ORDER — HYDROCODONE BITARTRATE AND ACETAMINOPHEN 5; 325 MG/1; MG/1
2 TABLET ORAL AS NEEDED
Status: CANCELLED | OUTPATIENT
Start: 2018-04-12

## 2018-04-13 ENCOUNTER — ANESTHESIA (OUTPATIENT)
Dept: SURGERY | Age: 68
DRG: 470 | End: 2018-04-13
Payer: MEDICARE

## 2018-04-13 ENCOUNTER — HOSPITAL ENCOUNTER (INPATIENT)
Age: 68
LOS: 3 days | Discharge: NURSING, REHAB OR LONG-TERM CARE | DRG: 470 | End: 2018-04-16
Attending: ORTHOPAEDIC SURGERY | Admitting: ORTHOPAEDIC SURGERY
Payer: MEDICARE

## 2018-04-13 DIAGNOSIS — Z96.659 POSTOPERATIVE STIFFNESS OF TOTAL KNEE REPLACEMENT, INITIAL ENCOUNTER (HCC): ICD-10-CM

## 2018-04-13 DIAGNOSIS — T84.89XA POSTOPERATIVE STIFFNESS OF TOTAL KNEE REPLACEMENT, INITIAL ENCOUNTER (HCC): ICD-10-CM

## 2018-04-13 DIAGNOSIS — M25.669 POSTOPERATIVE STIFFNESS OF TOTAL KNEE REPLACEMENT, INITIAL ENCOUNTER (HCC): ICD-10-CM

## 2018-04-13 DIAGNOSIS — M25.661 STIFFNESS OF RIGHT KNEE: ICD-10-CM

## 2018-04-13 DIAGNOSIS — Z96.651 STATUS POST TOTAL RIGHT KNEE REPLACEMENT: Primary | ICD-10-CM

## 2018-04-13 LAB
ABO + RH BLD: NORMAL
BLOOD GROUP ANTIBODIES SERPL: NORMAL
GLUCOSE BLD STRIP.AUTO-MCNC: 85 MG/DL (ref 65–100)
HGB BLD-MCNC: 11.5 G/DL (ref 11.7–15.4)
SPECIMEN EXP DATE BLD: NORMAL

## 2018-04-13 PROCEDURE — 77030036688 HC BLNKT CLD THER S2SG -B

## 2018-04-13 PROCEDURE — 74011000302 HC RX REV CODE- 302: Performed by: ORTHOPAEDIC SURGERY

## 2018-04-13 PROCEDURE — 77030032490 HC SLV COMPR SCD KNE COVD -B

## 2018-04-13 PROCEDURE — 77030011208: Performed by: ORTHOPAEDIC SURGERY

## 2018-04-13 PROCEDURE — 74011250636 HC RX REV CODE- 250/636

## 2018-04-13 PROCEDURE — 74011250637 HC RX REV CODE- 250/637: Performed by: PHYSICIAN ASSISTANT

## 2018-04-13 PROCEDURE — 77030035236 HC SUT PDS STRATFX BARB J&J -B: Performed by: ORTHOPAEDIC SURGERY

## 2018-04-13 PROCEDURE — 77030003665 HC NDL SPN BBMI -A: Performed by: ANESTHESIOLOGY

## 2018-04-13 PROCEDURE — 86580 TB INTRADERMAL TEST: CPT | Performed by: ORTHOPAEDIC SURGERY

## 2018-04-13 PROCEDURE — 77030020782 HC GWN BAIR PAWS FLX 3M -B: Performed by: ANESTHESIOLOGY

## 2018-04-13 PROCEDURE — 77030034849: Performed by: ORTHOPAEDIC SURGERY

## 2018-04-13 PROCEDURE — 77030011640 HC PAD GRND REM COVD -A: Performed by: ORTHOPAEDIC SURGERY

## 2018-04-13 PROCEDURE — 77030012935 HC DRSG AQUACEL BMS -B: Performed by: ORTHOPAEDIC SURGERY

## 2018-04-13 PROCEDURE — 76010000162 HC OR TIME 1.5 TO 2 HR INTENSV-TIER 1: Performed by: ORTHOPAEDIC SURGERY

## 2018-04-13 PROCEDURE — 76210000016 HC OR PH I REC 1 TO 1.5 HR: Performed by: ORTHOPAEDIC SURGERY

## 2018-04-13 PROCEDURE — 77030002966 HC SUT PDS J&J -A: Performed by: ORTHOPAEDIC SURGERY

## 2018-04-13 PROCEDURE — 77030031139 HC SUT VCRL2 J&J -A: Performed by: ORTHOPAEDIC SURGERY

## 2018-04-13 PROCEDURE — 77030037364 HC TIB INST CR  DISP STRY -C: Performed by: ORTHOPAEDIC SURGERY

## 2018-04-13 PROCEDURE — 94760 N-INVAS EAR/PLS OXIMETRY 1: CPT

## 2018-04-13 PROCEDURE — 65270000029 HC RM PRIVATE

## 2018-04-13 PROCEDURE — 74011000250 HC RX REV CODE- 250

## 2018-04-13 PROCEDURE — C1776 JOINT DEVICE (IMPLANTABLE): HCPCS | Performed by: ORTHOPAEDIC SURGERY

## 2018-04-13 PROCEDURE — 77030003602 HC NDL NRV BLK BBMI -B: Performed by: ANESTHESIOLOGY

## 2018-04-13 PROCEDURE — 74011250636 HC RX REV CODE- 250/636: Performed by: ORTHOPAEDIC SURGERY

## 2018-04-13 PROCEDURE — 36415 COLL VENOUS BLD VENIPUNCTURE: CPT | Performed by: PHYSICIAN ASSISTANT

## 2018-04-13 PROCEDURE — 74011250637 HC RX REV CODE- 250/637: Performed by: ANESTHESIOLOGY

## 2018-04-13 PROCEDURE — 94762 N-INVAS EAR/PLS OXIMTRY CONT: CPT

## 2018-04-13 PROCEDURE — 82962 GLUCOSE BLOOD TEST: CPT

## 2018-04-13 PROCEDURE — 74011250636 HC RX REV CODE- 250/636: Performed by: PHYSICIAN ASSISTANT

## 2018-04-13 PROCEDURE — 74011000250 HC RX REV CODE- 250: Performed by: ANESTHESIOLOGY

## 2018-04-13 PROCEDURE — 85018 HEMOGLOBIN: CPT | Performed by: PHYSICIAN ASSISTANT

## 2018-04-13 PROCEDURE — 74011250636 HC RX REV CODE- 250/636: Performed by: ANESTHESIOLOGY

## 2018-04-13 PROCEDURE — 76942 ECHO GUIDE FOR BIOPSY: CPT | Performed by: ORTHOPAEDIC SURGERY

## 2018-04-13 PROCEDURE — 77030019557 HC ELECTRD VES SEAL MEDT -F: Performed by: ORTHOPAEDIC SURGERY

## 2018-04-13 PROCEDURE — 77030037363 HC FEM INST CR  DISP STRY -C: Performed by: ORTHOPAEDIC SURGERY

## 2018-04-13 PROCEDURE — 77030007880 HC KT SPN EPDRL BBMI -B: Performed by: ANESTHESIOLOGY

## 2018-04-13 PROCEDURE — 76010010054 HC POST OP PAIN BLOCK: Performed by: ORTHOPAEDIC SURGERY

## 2018-04-13 PROCEDURE — 0SRC0JA REPLACEMENT OF RIGHT KNEE JOINT WITH SYNTHETIC SUBSTITUTE, UNCEMENTED, OPEN APPROACH: ICD-10-PCS | Performed by: ORTHOPAEDIC SURGERY

## 2018-04-13 PROCEDURE — 74011000250 HC RX REV CODE- 250: Performed by: ORTHOPAEDIC SURGERY

## 2018-04-13 PROCEDURE — 77030006789 HC BLD SAW OSC STRY -C: Performed by: ORTHOPAEDIC SURGERY

## 2018-04-13 PROCEDURE — 74011000258 HC RX REV CODE- 258: Performed by: ORTHOPAEDIC SURGERY

## 2018-04-13 PROCEDURE — 76060000034 HC ANESTHESIA 1.5 TO 2 HR: Performed by: ORTHOPAEDIC SURGERY

## 2018-04-13 PROCEDURE — 77030002912 HC SUT ETHBND J&J -A: Performed by: ORTHOPAEDIC SURGERY

## 2018-04-13 PROCEDURE — 77030013727 HC IRR FAN PULSVC ZIMM -B: Performed by: ORTHOPAEDIC SURGERY

## 2018-04-13 PROCEDURE — 77030008467 HC STPLR SKN COVD -B: Performed by: ORTHOPAEDIC SURGERY

## 2018-04-13 PROCEDURE — 86900 BLOOD TYPING SEROLOGIC ABO: CPT | Performed by: PHYSICIAN ASSISTANT

## 2018-04-13 PROCEDURE — 77030018836 HC SOL IRR NACL ICUM -A: Performed by: ORTHOPAEDIC SURGERY

## 2018-04-13 PROCEDURE — 77030006720 HC BLD PAT RMR ZIMM -B: Performed by: ORTHOPAEDIC SURGERY

## 2018-04-13 DEVICE — TIBIAL BEARING INSERT
Type: IMPLANTABLE DEVICE | Site: KNEE | Status: FUNCTIONAL
Brand: TRIATHLON

## 2018-04-13 DEVICE — TIBIAL COMPONENT
Type: IMPLANTABLE DEVICE | Site: KNEE | Status: FUNCTIONAL
Brand: TRIATHLON

## 2018-04-13 DEVICE — PATELLA
Type: IMPLANTABLE DEVICE | Site: KNEE | Status: FUNCTIONAL
Brand: TRIATHLON

## 2018-04-13 DEVICE — CRUCIATE RETAINING FEMORAL
Type: IMPLANTABLE DEVICE | Site: KNEE | Status: FUNCTIONAL
Brand: TRIATHLON

## 2018-04-13 RX ORDER — ACETAMINOPHEN 10 MG/ML
1000 INJECTION, SOLUTION INTRAVENOUS ONCE
Status: COMPLETED | OUTPATIENT
Start: 2018-04-13 | End: 2018-04-13

## 2018-04-13 RX ORDER — HYDROMORPHONE HYDROCHLORIDE 2 MG/1
2 TABLET ORAL
Qty: 40 TAB | Refills: 0 | Status: SHIPPED | OUTPATIENT
Start: 2018-04-13 | End: 2018-06-22 | Stop reason: CLARIF

## 2018-04-13 RX ORDER — CELECOXIB 200 MG/1
200 CAPSULE ORAL EVERY 12 HOURS
Status: DISCONTINUED | OUTPATIENT
Start: 2018-04-13 | End: 2018-04-16 | Stop reason: HOSPADM

## 2018-04-13 RX ORDER — PROPOFOL 10 MG/ML
INJECTION, EMULSION INTRAVENOUS
Status: DISCONTINUED | OUTPATIENT
Start: 2018-04-13 | End: 2018-04-13 | Stop reason: HOSPADM

## 2018-04-13 RX ORDER — CEFAZOLIN SODIUM/WATER 2 G/20 ML
2 SYRINGE (ML) INTRAVENOUS ONCE
Status: COMPLETED | OUTPATIENT
Start: 2018-04-13 | End: 2018-04-13

## 2018-04-13 RX ORDER — DIPHENHYDRAMINE HCL 25 MG
25 CAPSULE ORAL
Status: DISCONTINUED | OUTPATIENT
Start: 2018-04-13 | End: 2018-04-16 | Stop reason: HOSPADM

## 2018-04-13 RX ORDER — LIDOCAINE HYDROCHLORIDE 10 MG/ML
0.1 INJECTION INFILTRATION; PERINEURAL AS NEEDED
Status: DISCONTINUED | OUTPATIENT
Start: 2018-04-13 | End: 2018-04-13 | Stop reason: HOSPADM

## 2018-04-13 RX ORDER — LANOLIN ALCOHOL/MO/W.PET/CERES
400 CREAM (GRAM) TOPICAL DAILY
Status: DISCONTINUED | OUTPATIENT
Start: 2018-04-14 | End: 2018-04-16 | Stop reason: HOSPADM

## 2018-04-13 RX ORDER — KETOROLAC TROMETHAMINE 30 MG/ML
INJECTION, SOLUTION INTRAMUSCULAR; INTRAVENOUS AS NEEDED
Status: DISCONTINUED | OUTPATIENT
Start: 2018-04-13 | End: 2018-04-13 | Stop reason: HOSPADM

## 2018-04-13 RX ORDER — AMOXICILLIN 250 MG
2 CAPSULE ORAL DAILY
Status: DISCONTINUED | OUTPATIENT
Start: 2018-04-14 | End: 2018-04-16 | Stop reason: HOSPADM

## 2018-04-13 RX ORDER — MIDAZOLAM HYDROCHLORIDE 1 MG/ML
INJECTION, SOLUTION INTRAMUSCULAR; INTRAVENOUS AS NEEDED
Status: DISCONTINUED | OUTPATIENT
Start: 2018-04-13 | End: 2018-04-13 | Stop reason: HOSPADM

## 2018-04-13 RX ORDER — ASPIRIN 81 MG/1
81 TABLET ORAL EVERY 12 HOURS
Status: DISCONTINUED | OUTPATIENT
Start: 2018-04-13 | End: 2018-04-16 | Stop reason: HOSPADM

## 2018-04-13 RX ORDER — ROPIVACAINE HYDROCHLORIDE 5 MG/ML
INJECTION, SOLUTION EPIDURAL; INFILTRATION; PERINEURAL AS NEEDED
Status: DISCONTINUED | OUTPATIENT
Start: 2018-04-13 | End: 2018-04-13 | Stop reason: HOSPADM

## 2018-04-13 RX ORDER — SODIUM CHLORIDE 9 MG/ML
100 INJECTION, SOLUTION INTRAVENOUS CONTINUOUS
Status: DISPENSED | OUTPATIENT
Start: 2018-04-13 | End: 2018-04-15

## 2018-04-13 RX ORDER — SODIUM CHLORIDE, SODIUM LACTATE, POTASSIUM CHLORIDE, CALCIUM CHLORIDE 600; 310; 30; 20 MG/100ML; MG/100ML; MG/100ML; MG/100ML
75 INJECTION, SOLUTION INTRAVENOUS CONTINUOUS
Status: DISCONTINUED | OUTPATIENT
Start: 2018-04-13 | End: 2018-04-13 | Stop reason: HOSPADM

## 2018-04-13 RX ORDER — ACETAMINOPHEN 500 MG
1000 TABLET ORAL EVERY 6 HOURS
Status: DISCONTINUED | OUTPATIENT
Start: 2018-04-14 | End: 2018-04-16 | Stop reason: HOSPADM

## 2018-04-13 RX ORDER — LIDOCAINE HYDROCHLORIDE 20 MG/ML
INJECTION, SOLUTION EPIDURAL; INFILTRATION; INTRACAUDAL; PERINEURAL AS NEEDED
Status: DISCONTINUED | OUTPATIENT
Start: 2018-04-13 | End: 2018-04-13 | Stop reason: HOSPADM

## 2018-04-13 RX ORDER — ASPIRIN 81 MG/1
81 TABLET ORAL EVERY 12 HOURS
Qty: 60 TAB | Refills: 1 | Status: SHIPPED | OUTPATIENT
Start: 2018-04-13 | End: 2018-05-18

## 2018-04-13 RX ORDER — MIDAZOLAM HYDROCHLORIDE 1 MG/ML
5 INJECTION, SOLUTION INTRAMUSCULAR; INTRAVENOUS ONCE
Status: COMPLETED | OUTPATIENT
Start: 2018-04-13 | End: 2018-04-13

## 2018-04-13 RX ORDER — DEXAMETHASONE SODIUM PHOSPHATE 100 MG/10ML
10 INJECTION INTRAMUSCULAR; INTRAVENOUS ONCE
Status: ACTIVE | OUTPATIENT
Start: 2018-04-14 | End: 2018-04-15

## 2018-04-13 RX ORDER — HYDROMORPHONE HYDROCHLORIDE 2 MG/ML
1 INJECTION, SOLUTION INTRAMUSCULAR; INTRAVENOUS; SUBCUTANEOUS
Status: DISCONTINUED | OUTPATIENT
Start: 2018-04-13 | End: 2018-04-16 | Stop reason: HOSPADM

## 2018-04-13 RX ORDER — FAMOTIDINE 20 MG/1
20 TABLET, FILM COATED ORAL ONCE
Status: COMPLETED | OUTPATIENT
Start: 2018-04-13 | End: 2018-04-13

## 2018-04-13 RX ORDER — NEOMYCIN AND POLYMYXIN B SULFATES 40; 200000 MG/ML; [USP'U]/ML
SOLUTION IRRIGATION AS NEEDED
Status: DISCONTINUED | OUTPATIENT
Start: 2018-04-13 | End: 2018-04-13 | Stop reason: HOSPADM

## 2018-04-13 RX ORDER — BUPIVACAINE HYDROCHLORIDE 7.5 MG/ML
INJECTION, SOLUTION INTRASPINAL AS NEEDED
Status: DISCONTINUED | OUTPATIENT
Start: 2018-04-13 | End: 2018-04-13 | Stop reason: HOSPADM

## 2018-04-13 RX ORDER — HYDROMORPHONE HYDROCHLORIDE 2 MG/1
2 TABLET ORAL
Status: DISCONTINUED | OUTPATIENT
Start: 2018-04-13 | End: 2018-04-16 | Stop reason: HOSPADM

## 2018-04-13 RX ORDER — ONDANSETRON 2 MG/ML
INJECTION INTRAMUSCULAR; INTRAVENOUS AS NEEDED
Status: DISCONTINUED | OUTPATIENT
Start: 2018-04-13 | End: 2018-04-13 | Stop reason: HOSPADM

## 2018-04-13 RX ORDER — MIDAZOLAM HYDROCHLORIDE 1 MG/ML
2 INJECTION, SOLUTION INTRAMUSCULAR; INTRAVENOUS
Status: DISCONTINUED | OUTPATIENT
Start: 2018-04-13 | End: 2018-04-13 | Stop reason: HOSPADM

## 2018-04-13 RX ORDER — SODIUM CHLORIDE 0.9 % (FLUSH) 0.9 %
5-10 SYRINGE (ML) INJECTION EVERY 8 HOURS
Status: DISCONTINUED | OUTPATIENT
Start: 2018-04-13 | End: 2018-04-16 | Stop reason: HOSPADM

## 2018-04-13 RX ORDER — DEXAMETHASONE SODIUM PHOSPHATE 4 MG/ML
INJECTION, SOLUTION INTRA-ARTICULAR; INTRALESIONAL; INTRAMUSCULAR; INTRAVENOUS; SOFT TISSUE AS NEEDED
Status: DISCONTINUED | OUTPATIENT
Start: 2018-04-13 | End: 2018-04-13 | Stop reason: HOSPADM

## 2018-04-13 RX ORDER — SODIUM CHLORIDE 0.9 % (FLUSH) 0.9 %
5-10 SYRINGE (ML) INJECTION AS NEEDED
Status: DISCONTINUED | OUTPATIENT
Start: 2018-04-13 | End: 2018-04-16 | Stop reason: HOSPADM

## 2018-04-13 RX ORDER — TRANEXAMIC ACID 100 MG/ML
INJECTION, SOLUTION INTRAVENOUS AS NEEDED
Status: DISCONTINUED | OUTPATIENT
Start: 2018-04-13 | End: 2018-04-13 | Stop reason: HOSPADM

## 2018-04-13 RX ORDER — CETIRIZINE HYDROCHLORIDE 5 MG/1
5 TABLET ORAL DAILY
Status: DISCONTINUED | OUTPATIENT
Start: 2018-04-13 | End: 2018-04-16 | Stop reason: HOSPADM

## 2018-04-13 RX ORDER — NALOXONE HYDROCHLORIDE 0.4 MG/ML
.2-.4 INJECTION, SOLUTION INTRAMUSCULAR; INTRAVENOUS; SUBCUTANEOUS
Status: DISCONTINUED | OUTPATIENT
Start: 2018-04-13 | End: 2018-04-16 | Stop reason: HOSPADM

## 2018-04-13 RX ORDER — ONDANSETRON 2 MG/ML
4 INJECTION INTRAMUSCULAR; INTRAVENOUS
Status: DISCONTINUED | OUTPATIENT
Start: 2018-04-13 | End: 2018-04-16 | Stop reason: HOSPADM

## 2018-04-13 RX ORDER — FENTANYL CITRATE 50 UG/ML
100 INJECTION, SOLUTION INTRAMUSCULAR; INTRAVENOUS ONCE
Status: COMPLETED | OUTPATIENT
Start: 2018-04-13 | End: 2018-04-13

## 2018-04-13 RX ORDER — CEFAZOLIN SODIUM/WATER 2 G/20 ML
2 SYRINGE (ML) INTRAVENOUS EVERY 8 HOURS
Status: COMPLETED | OUTPATIENT
Start: 2018-04-13 | End: 2018-04-14

## 2018-04-13 RX ADMIN — SODIUM CHLORIDE, SODIUM LACTATE, POTASSIUM CHLORIDE, AND CALCIUM CHLORIDE 75 ML/HR: 600; 310; 30; 20 INJECTION, SOLUTION INTRAVENOUS at 11:46

## 2018-04-13 RX ADMIN — TUBERCULIN PURIFIED PROTEIN DERIVATIVE 5 UNITS: 5 INJECTION, SOLUTION INTRADERMAL at 10:19

## 2018-04-13 RX ADMIN — LIDOCAINE HYDROCHLORIDE 40 MG: 20 INJECTION, SOLUTION EPIDURAL; INFILTRATION; INTRACAUDAL; PERINEURAL at 12:50

## 2018-04-13 RX ADMIN — PROPOFOL 50 MCG/KG/MIN: 10 INJECTION, EMULSION INTRAVENOUS at 12:50

## 2018-04-13 RX ADMIN — ONDANSETRON 4 MG: 2 INJECTION INTRAMUSCULAR; INTRAVENOUS at 16:14

## 2018-04-13 RX ADMIN — SODIUM CHLORIDE, SODIUM LACTATE, POTASSIUM CHLORIDE, AND CALCIUM CHLORIDE 75 ML/HR: 600; 310; 30; 20 INJECTION, SOLUTION INTRAVENOUS at 10:18

## 2018-04-13 RX ADMIN — SODIUM CHLORIDE 100 ML/HR: 900 INJECTION, SOLUTION INTRAVENOUS at 16:17

## 2018-04-13 RX ADMIN — Medication 2 G: at 21:02

## 2018-04-13 RX ADMIN — DEXAMETHASONE SODIUM PHOSPHATE 10 MG: 4 INJECTION, SOLUTION INTRA-ARTICULAR; INTRALESIONAL; INTRAMUSCULAR; INTRAVENOUS; SOFT TISSUE at 12:49

## 2018-04-13 RX ADMIN — MIDAZOLAM HYDROCHLORIDE 0.5 MG: 1 INJECTION, SOLUTION INTRAMUSCULAR; INTRAVENOUS at 13:15

## 2018-04-13 RX ADMIN — ACETAMINOPHEN 1000 MG: 10 INJECTION, SOLUTION INTRAVENOUS at 17:36

## 2018-04-13 RX ADMIN — MIDAZOLAM 3 MG: 1 INJECTION INTRAMUSCULAR; INTRAVENOUS at 11:35

## 2018-04-13 RX ADMIN — ASPIRIN 81 MG: 81 TABLET, COATED ORAL at 20:55

## 2018-04-13 RX ADMIN — BUPIVACAINE HYDROCHLORIDE 1.8 ML: 7.5 INJECTION, SOLUTION INTRASPINAL at 12:41

## 2018-04-13 RX ADMIN — ONDANSETRON 4 MG: 2 INJECTION INTRAMUSCULAR; INTRAVENOUS at 12:49

## 2018-04-13 RX ADMIN — FENTANYL CITRATE 50 MCG: 50 INJECTION INTRAMUSCULAR; INTRAVENOUS at 11:35

## 2018-04-13 RX ADMIN — CELECOXIB 200 MG: 200 CAPSULE ORAL at 20:55

## 2018-04-13 RX ADMIN — Medication 2 G: at 12:34

## 2018-04-13 RX ADMIN — MIDAZOLAM HYDROCHLORIDE 0.5 MG: 1 INJECTION, SOLUTION INTRAMUSCULAR; INTRAVENOUS at 12:57

## 2018-04-13 RX ADMIN — HYDROMORPHONE HYDROCHLORIDE 2 MG: 2 TABLET ORAL at 20:55

## 2018-04-13 RX ADMIN — TRANEXAMIC ACID 1 G: 100 INJECTION, SOLUTION INTRAVENOUS at 12:35

## 2018-04-13 RX ADMIN — MIDAZOLAM HYDROCHLORIDE 1 MG: 1 INJECTION, SOLUTION INTRAMUSCULAR; INTRAVENOUS at 12:39

## 2018-04-13 RX ADMIN — SODIUM CHLORIDE, SODIUM LACTATE, POTASSIUM CHLORIDE, AND CALCIUM CHLORIDE: 600; 310; 30; 20 INJECTION, SOLUTION INTRAVENOUS at 12:55

## 2018-04-13 RX ADMIN — LIDOCAINE HYDROCHLORIDE 0.1 ML: 10 INJECTION, SOLUTION INFILTRATION; PERINEURAL at 10:21

## 2018-04-13 RX ADMIN — FAMOTIDINE 20 MG: 20 TABLET, FILM COATED ORAL at 10:00

## 2018-04-13 NOTE — PERIOP NOTES
TRANSFER - OUT REPORT:    Verbal report given to [facility] RN  on Dm Almita  being transferred to [facility] for routine post - op       Report consisted of patients Situation, Background, Assessment and   Recommendations(SBAR). Information from the following report(s) SBAR was reviewed with the receiving nurse. Opportunity for questions and clarification was provided.       Patient transported with:   [employer]

## 2018-04-13 NOTE — ANESTHESIA PREPROCEDURE EVALUATION
Anesthetic History   No history of anesthetic complications            Review of Systems / Medical History  Patient summary reviewed and pertinent labs reviewed    Pulmonary  Within defined limits                 Neuro/Psych   Within defined limits           Cardiovascular  Within defined limits                Exercise tolerance: >4 METS     GI/Hepatic/Renal     GERD: well controlled           Endo/Other  Within defined limits           Other Findings            Physical Exam    Airway  Mallampati: II  TM Distance: 4 - 6 cm  Neck ROM: normal range of motion   Mouth opening: Normal     Cardiovascular  Regular rate and rhythm,  S1 and S2 normal,  no murmur, click, rub, or gallop             Dental         Pulmonary  Breath sounds clear to auscultation               Abdominal  GI exam deferred       Other Findings            Anesthetic Plan    ASA: 1  Anesthesia type: spinal      Post-op pain plan if not by surgeon: peripheral nerve block single      Anesthetic plan and risks discussed with: Patient

## 2018-04-13 NOTE — PROGRESS NOTES
04/13/18 1612   Oxygen Therapy   O2 Sat (%) 96 %   Pulse via Oximetry 68 beats per minute   O2 Device Room air   Incentive Spirometry Treatment   Actual Volume (ml) 1500 ml   Number of Attempts 1   Patient achieved 1500 Ml/sec on IS. Patient encouraged to do 10 breaths every hour while awake-patient agreed and demonstrated. No shortness of breath or distress noted. [facility] notes reviewed- continuous SAT monitoring ordered during hours of sleep.

## 2018-04-13 NOTE — ANESTHESIA POSTPROCEDURE EVALUATION
Post-Anesthesia Evaluation and Assessment    Patient: Robby Egan MRN: [id number]  SSN: [id number]    YOB: 1950  Age: 76 y.o. Sex: female       Cardiovascular Function/Vital Signs  Visit Vitals    /61 (BP 1 Location: Left arm, BP Patient Position: At rest)    Pulse 70    Temp 36.9 °C (98.5 °F)    Resp 14    Ht 5' 3\" (1.6 m)    Wt 64.9 kg (143 lb)    SpO2 100%    BMI 25.33 kg/m2       Patient is status post spinal anesthesia for Procedure(s):  RIGHT KNEE ARTHROPLASTY TOTAL / [place] / FNB. Nausea/Vomiting: None    Postoperative hydration reviewed and adequate. Pain:  Pain Scale 1: Visual (04/13/18 1440)  Pain Intensity 1: 0 (04/13/18 1440)   Managed    Neurological Status:   Neuro (WDL): Exceptions to WDL (04/13/18 1410)  Neuro  Neurologic State: Sleeping (04/13/18 1410)  LUE Motor Response: Purposeful (04/13/18 1410)  LLE Motor Response: Pharmacologically paralyzed (04/13/18 1410)  RUE Motor Response: Purposeful (04/13/18 1410)  RLE Motor Response: Pharmacologically paralyzed (04/13/18 1410)   At baseline    Mental Status and Level of Consciousness: Arousable    Pulmonary Status:   O2 Device: Nasal cannula (04/13/18 1440)   Adequate oxygenation and airway patent    Complications related to anesthesia: None    Post-anesthesia assessment completed.  No concerns    Signed By: Benji Woods MD     April 13, 2018

## 2018-04-13 NOTE — PHYSICIAN ADVISORY
Letter of Determination: Inpatient Status Appropriate    This patient was originally hospitalized as Inpatient Status on 4/13/2018 for scheduled right total knee arthroplasty. This patient is appropriate for Inpatient Admission in accordance with CMS regulation Section 43 .3. Specifically, patient's stay is expected to be more than Two Midnights and was medically necessary. The patient's stay was medically necessary based on age > 72, and lower extremity neuropathy. Consistent with CMS guidelines, patient meets for inpatient status. It is our recommendation that this patient's hospitalization status should be INPATIENT status.      The final decision regarding the patient's hospitalization status depends on the attending physician's judgement.     Sierra Gutierrez MD, ROB,   Physician Andrew Cobos.

## 2018-04-13 NOTE — CONSULTS
Hospitalist Consult Note     Admit Date:  2018  8:57 AM   Name:  Sade Heading   Age:  76 y.o.  :  1950   MRN:  [id number]   PCP:  Aissatou Guadarrama MD  Treatment Team: Attending Provider: Soraya Rojas MD; Utilization Review: Ava Parikh RN; Consulting Provider: Marc Cali MD; Consulting Provider: Shantel Baumann MD    HPI:     Madalyn May is a 77 yo female who is s/p right knee arthroplasty for OA on a background of GERD, seasonal allergies and neuropathy. We were consulted. This morning, she denies any dyspnea, chest pain or cough. She does report post nasal drip but has stopped taking Zyrtec for 1 week. Her BP is WNL and afebrile. 10 systems reviewed and negative except as noted in HPI.   Past Medical History:   Diagnosis Date    Environmental and seasonal allergies     GERD (gastroesophageal reflux disease)     diet controlled     Neuropathy     bilateral lower extremities     Osteoporosis     Primary osteoarthritis of right knee     Status post total right knee replacement 2018      Past Surgical History:   Procedure Laterality Date    HX COLONOSCOPY      HX TONSILLECTOMY      HX TUBAL LIGATION        Current Facility-Administered Medications   Medication Dose Route Frequency    tuberculin injection 5 Units  5 Units IntraDERMal ONCE    [START ON 2018] magnesium oxide (MAG-OX) tablet 400 mg  400 mg Oral DAILY    0.9% sodium chloride infusion  100 mL/hr IntraVENous CONTINUOUS    sodium chloride (NS) flush 5-10 mL  5-10 mL IntraVENous Q8H    sodium chloride (NS) flush 5-10 mL  5-10 mL IntraVENous PRN    ceFAZolin (ANCEF) 2 g/20 mL in sterile water IV syringe  2 g IntraVENous Q8H    [START ON 2018] acetaminophen (TYLENOL) tablet 1,000 mg  1,000 mg Oral Q6H    celecoxib (CELEBREX) capsule 200 mg  200 mg Oral Q12H    HYDROmorphone (DILAUDID) tablet 2 mg  2 mg Oral Q4H PRN    HYDROmorphone (PF) (DILAUDID) injection 1 mg  1 mg IntraVENous Q3H PRN    naloxone (NARCAN) injection 0.2-0.4 mg  0.2-0.4 mg IntraVENous Q10MIN PRN    [START ON 4/14/2018] dexamethasone (DECADRON) injection 10 mg  10 mg IntraVENous ONCE    ondansetron (ZOFRAN) injection 4 mg  4 mg IntraVENous Q4H PRN    diphenhydrAMINE (BENADRYL) capsule 25 mg  25 mg Oral Q4H PRN    [START ON 4/14/2018] senna-docusate (PERICOLACE) 8.6-50 mg per tablet 2 Tab  2 Tab Oral DAILY    aspirin delayed-release tablet 81 mg  81 mg Oral Q12H     No Known Allergies   Social History   Substance Use Topics    Smoking status: Never Smoker    Smokeless tobacco: Never Used    Alcohol use No      Family History   Problem Relation Age of Onset    Alzheimer Mother     Cancer Father       Immunization History   Administered Date(s) Administered    TB Skin Test (PPD) Intradermal 04/13/2018       Objective:   Patient Vitals for the past 24 hrs:   Temp Pulse Resp BP SpO2   04/13/18 1612 - - - - 96 %   04/13/18 1545 97.5 °F (36.4 °C) (!) 58 16 99/62 97 %   04/13/18 1510 - 62 14 116/66 100 %   04/13/18 1455 - 60 14 110/62 100 %   04/13/18 1440 - 70 14 109/61 100 %   04/13/18 1425 - 60 14 107/59 97 %   04/13/18 1420 - 64 14 107/60 96 %   04/13/18 1415 - 68 12 102/59 94 %   04/13/18 1410 98.5 °F (36.9 °C) 70 12 109/58 95 %   04/13/18 1224 - - 16 126/60 99 %   04/13/18 1209 - - 14 118/64 98 %   04/13/18 1154 - 81 14 111/56 97 %   04/13/18 1149 - 82 16 114/57 99 %   04/13/18 1144 - 81 14 109/55 96 %   04/13/18 1139 - 62 16 121/61 96 %   04/13/18 1135 - (!) 58 16 143/72 97 %   04/13/18 1132 - 60 16 134/71 100 %   04/13/18 1001 97.6 °F (36.4 °C) 65 16 137/70 98 %     Oxygen Therapy  O2 Sat (%): 96 % (04/13/18 1612)  Pulse via Oximetry: 68 beats per minute (04/13/18 1612)  O2 Device: Room air (04/13/18 1612)  O2 Flow Rate (L/min): 2 l/min (04/13/18 1455)    Intake/Output Summary (Last 24 hours) at 04/13/18 1827  Last data filed at 04/13/18 1350   Gross per 24 hour   Intake             1000 ml Output              925 ml   Net               75 ml       Physical Exam:  General:    Well nourished. Alert. Eyes:   Normal sclera. Extraocular movements intact. ENT:  Normocephalic, atraumatic. Moist mucous membranes  CV:   RRR. No murmur, rub, or gallop. Lungs:  CTAB. No wheezing, rhonchi, or rales. Abdomen: Soft, nontender, nondistended. Bowel sounds normal.   Extremities: Warm and dry. Neurologic: CN II-XII grossly intact. Sensation intact. Skin:     No rashes or jaundice. Psych:  Normal mood and affect. I reviewed the labs, imaging, EKGs, telemetry, and other studies done this admission. Data Review:   Recent Results (from the past 24 hour(s))   TYPE & SCREEN    Collection Time: 04/13/18 10:18 AM   Result Value Ref Range    Crossmatch Expiration 04/16/2018     ABO/Rh(D) Orlene Marlette POSITIVE     Antibody screen NEG    GLUCOSE, POC    Collection Time: 04/13/18 10:18 AM   Result Value Ref Range    Glucose (POC) 85 65 - 100 mg/dL       All Micro Results     None          Other Studies:  No results found.     Assessment and Plan:     Hospital Problems as of 4/13/2018  Never Reviewed          Codes Class Noted - Resolved POA    * (Principal)Status post total right knee replacement ICD-10-CM: O06.959  ICD-9-CM: V43.65  4/13/2018 - Present No              PLAN:  s/p right knee arthroplasty for OA: Orthopedic surgery to manage  Seasonal allergies: Regularly takes Zyrtec and stopped for the past week, now symptomatic Will restart Zyrtec  Will sign off at this time  Please call with any questions or concerns  Signed:  Ryan Schrader MD

## 2018-04-13 NOTE — DISCHARGE INSTRUCTIONS
[facility]   Patient Discharge Instructions    Gilford Brought / [id number] : 1950    Admitted 2018 Discharged: 2018     IF YOU HAVE ANY PROBLEMS ONCE YOU ARE AT HOME CALL THE FOLLOWING NUMBERS:   Main office number: [phone]      Medications    · The medications you are to continue on are listed on the medication reconciliation sheet. · Narcotic pain medications as well as supplemental iron can cause constipation. If this occurs try stopping the narcotic pain medication and/or the iron. · It is important that you take the medication exactly as they are prescribed. · Medications which increase your risk of blood clots are listed to stop for 5 weeks after surgery as well as medications or supplements which increase your risk of bleeding complications. · Keep your medication in the bottles provided by the pharmacist and keep a list of the medication names, dosages, and times to be taken in your wallet. · Do not take other medications without consulting your doctor. Important Information    Do NOT smoke as this will greatly increase your risk of infection! Resume your prehospital diet. If you have excessive nausea or vomitting call your doctor's office     Leg swelling and warmth is normal for 6 months after surgery. If you experience swelling in your leg elevate you leg while laying down with your toes above your heart. If you have sudden onset severe swelling with leg pain call our office. Use Colin Hose stockings until we see you in the office for your follow up appointment. The stitches deep inside take approximately 6 months to dissolve. There will be sharp shooting, stinging and burning pain. This is normal and will resolve between 3-6 months after surgery. Difficulty sleeping is normal following total Knee and Hip replacement. You may try melatonin, an over-the-counter sleep aid or benadryl to help with sleep.  Most patients will resume sleeping through the night 8 weeks after surgery. Home Physical Therapy is arranged. Home Health will contact you within 48 hrs of discharge that you have chosen. If you have not received a call within this time frame please contact that provider you chose. You should be given this information before you leave the hospital.     You are at a risk for falls. Use the rolling walker when walking. Patients who have had a joint replacement should not drive if they are still taking narcotic pain mediation during the daytime hours. Most patients wean themselves off of pain medication within 2-5 weeks after surgery. When to Call the office    - If you have a temperature greater then 101  - Uncontrolled vomiting   - Loose control of your bladder or bowel function  - Are unable to bear any wieght   - Need a pain medication refill     Information obtained by :  I understand that if any problems occur once I am at home I am to contact my physician. I understand and acknowledge receipt of the instructions indicated above.                                                                                                                                            Physician's or R.N.'s Signature                                                                  Date/Time                                                                                                                                              Patient or Representative Signature                                                          Date/Time

## 2018-04-13 NOTE — PERIOP NOTES
Teach back method performed with patient and family regarding pain management goals, incentive spirometry use, TB testing, and Hibiclens bathing.   Green home care needs sheet given to patient and family  Jean Spatz ([phone]

## 2018-04-13 NOTE — PROGRESS NOTES
PT & OT checked on pt post op TKA, this pm, but pt was too numb to safely attempt out of bed activity. Therapy will follow up in am 4/14/18.  Randy Aviles, PT, ODALIS

## 2018-04-13 NOTE — H&P
The patient has end stage arthritis of the right knee. The patient was see and examined and there are no changes to the patient's orthopedic condition. They have tried conservative treatment for this condition; including antiinflammatories and lifestyle modifications and have failed. The necessity for the joint replacement is still present, and the H&P from the office is still current. The patient will be admitted today for Procedure(s) (LRB):  RIGHT KNEE ARTHROPLASTY TOTAL / Sherri Lung / FNB (Right) .

## 2018-04-13 NOTE — OP NOTES
[facility]  Cementless Total Knee Arthroplasty: Posterior Cruciate Retaining     Celine Vera   : 1950  Medical Record [id number]  Pre-operative Diagnosis:  Primary osteoarthritis of right knee [M17.11]  Post-operative Diagnosis: Status post total right knee replacement  Location: [address] [place] [address]  Surgeon: Anders Lozada MD   Assistant: Nico Salinas PA-C    Anesthesia: Spinal and FNB    Procedure:Procedure(s) (LRB):  RIGHT KNEE ARTHROPLASTY TOTAL / Melvaen Farrell / FNB (Right)   The complexity of the total joint surgery requires the use of a first assistant for positioning, retraction and expertise in closure. Tourniquet Time: 0 minutes  EBL: 250 cc  Findings: severe degenerative arthritis, patellar osteophytes, posterior femoral osteophytes   BMI: Body mass index is 25.33 kg/(m^2). Marylen Somerset was brought to the operating room and positioned on the operating table. She was anesthestized with anesthesia. A glover catheter was placed preoperatively and IV antibiotics was administered. Prior to the incision being made a timeout was called identifying the patient, procedure ,operative side and surgeon The operative leg was prepped and draped in the usual sterile manner. An anterior longitudinal incision was accomplished just medial to the tibial tubercle and extending approximal 6 centimeters proximal to the superior pole of the patella. A medial parapatellar capsular incision was performed. The medial capsular flap was elevated around to the insertion of the semimembranous tendon. The patella was everted and the knee flexed and externally rotated. The medial and external menisci were excised. The lateral half of the fat pad excised and the patella femoral ligament was released. The anterior cruciate ligament was resect and the posterior cruciate ligament was retained.   Using extramedullary instrumentation, the tibial cut was accomplished with appropriate posterior slope. The distal femur was addressed. A drill hole was made above the intracondylar notch. Using appropriate intramedullary instrumentation,a five degree valgus distal cut was accomplished. The femur was sized. The anterior and posterior cuts were then made about the distal femur. The osteophytes were removed from the tibial and femoral surfaces. The flexion and extension gaps were assessed with the appropriate spacer blocks. Additional surgical procedures included: none. The flexion and extension gaps were deemed appropriately balanced. The appropriate cutting blocks were then utilized to perform the anterior, posterior and chamfer cuts, with appropriate lateral translation accomplished for the patellofemoral groove. Approximately 9 mm of bone was removed from the high side of the tibia. The tibia was sized. .  The tibial base plate was pinned into place with the appropriate external rotation and stem site prepared. A preliminary range of motion was accomplished. The  Patient was found to obtain full extension as well as appropriate flexion. The patient's ligaments were stable in flexion and extension to medial and lateral stressing and the alignment was through the appropriate mechanical axis. The patella was then everted. The bone was resect to accommodate the three peg patella button. A trial reduction revealed appropriate tracking through the patellofemoral groove with no lateral retinacular release being accomplished. All trial components were removed. The real implants were opened: Sizes listed below. The knee was irrigated. There were no femoral deficiencies. There were no tibial deficiencies. No augmentation was utilized. The permanent cementless Tibial and Femoral components were impacted into place. The cementless  patella component was then pressed in place.     Francisco Borer knee was placed through range of motion and noted to be stable as mentioned above with the trail components. The wound was dry, therefore no drain was used. The operative knee was injected with 60 cc of Naropin, 10 cc's of morphine and 1 cc of 30 mg of Toradol. The knee was then soaked with a diluted betadine solution for approximately 3 min. This was then thoroughly irrigated. The capsular layer was closed using a #1 PDS suture. Then, 1 gram (100 mg/ml) of Transexamic Acid was injected into the joint space. The subcutaneous layers were closed using 2-0 Stratafix. Finally the skin was closed using 3-0 Vicryl and skin staples, which were applied in occlusive fashion and sterile bandage applied. An Iceman cryo pad was applied on the operative leg. Sponge count and needle counts were correct. Bruna Saenz left the operating room     Implants:   Implant Name Type Inv.  Item Serial No.  Lot No. LRB No. Used   COMPNT FEM CR TRIATHLN 4 R PA --  - [id number]  COMPNT FEM CR TRIATHLN 4 R PA --  [id number] [employer] HOW [id number] Right 1   BASEPLT TIB PC TRITNM SZ 4 -- TRIATHLON - [id number]  BASEPLT TIB PC TRITNM SZ 4 -- TRIATHLON [id number] [employer] HOW [id number] Right 1   PAT ASYM MTL-BK 10MM SZ A32 -- TRIATHLON - [id number]  PAT ASYM MTL-BK 10MM SZ A32 -- TRIATHLON DMHP [employer] HOW [id number] Right 1   INSERT TIB CR TRIATHLN 4 9MM --  - [id number]   INSERT TIB CR TRIATHLN 4 9MM --  [id number] [employer] HOW [id number] Right 1         Signed By: Esperanza Bustos MD   4/13/2018,  2:06 PM

## 2018-04-13 NOTE — ANESTHESIA PROCEDURE NOTES
Spinal Block    Start time: 4/13/2018 12:39 PM  End time: 4/13/2018 12:41 PM  Performed by: Belinda May  Authorized by: Breanna HAINES     Pre-procedure:   Indications: at surgeon's request and primary anesthetic  Preanesthetic Checklist: patient identified, risks and benefits discussed, anesthesia consent, site marked, patient being monitored and timeout performed    Timeout Time: 12:38          Spinal Block:   Patient Position:  Seated  Prep Region:  Lumbar  Prep: DuraPrep      Location:  L2-3  Technique:  Single shot  Local:  Lidocaine 1%  Local Dose (mL):  3    Needle:   Needle Type:  Quincke  Needle Gauge:  22 G  Attempts:  1      Events: CSF confirmed, no blood with aspiration and no paresthesia        Assessment:  Insertion:  Uncomplicated  Patient tolerance:  Patient tolerated the procedure well with no immediate complications

## 2018-04-13 NOTE — ANESTHESIA PROCEDURE NOTES
Peripheral Block    Start time: 4/13/2018 11:36 AM  End time: 4/13/2018 11:39 AM  Performed by: Jayla Diaz  Authorized by: Sasha HAINES       Pre-procedure: Indications: at surgeon's request, post-op pain management and procedure for pain    Preanesthetic Checklist: patient identified, risks and benefits discussed, site marked, timeout performed, anesthesia consent given and patient being monitored    Timeout Time: 11:35          Block Type:   Block Type:   Adductor canal  Laterality:  Right  Monitoring:  Standard ASA monitoring, responsive to questions, continuous pulse ox, oxygen, frequent vital sign checks and heart rate  Injection Technique:  Single shot  Procedures: ultrasound guided    Patient Position: prone  Prep: chlorhexidine    Location:  Mid thigh  Needle Type:  Stimuplex  Needle Gauge:  22 G  Needle Localization:  Ultrasound guidance  Medication Injected:  0.2%  ropivacaine  Adds:  Epi 1:200K (Decadron 5 mg)  Volume (mL):  30    Assessment:  Number of attempts:  1  Injection Assessment:  Incremental injection every 5 mL, no paresthesia, ultrasound image on chart, local visualized surrounding nerve on ultrasound, negative aspiration for blood and no intravascular symptoms  Patient tolerance:  Patient tolerated the procedure well with no immediate complications

## 2018-04-14 LAB
ANION GAP SERPL CALC-SCNC: 5 MMOL/L (ref 7–16)
BUN SERPL-MCNC: 12 MG/DL (ref 8–23)
CALCIUM SERPL-MCNC: 8.7 MG/DL (ref 8.3–10.4)
CHLORIDE SERPL-SCNC: 106 MMOL/L (ref 98–107)
CO2 SERPL-SCNC: 29 MMOL/L (ref 21–32)
CREAT SERPL-MCNC: 0.68 MG/DL (ref 0.6–1)
GLUCOSE SERPL-MCNC: 120 MG/DL (ref 65–100)
HGB BLD-MCNC: 10.9 G/DL (ref 11.7–15.4)
MM INDURATION POC: 0 MM (ref 0–5)
POTASSIUM SERPL-SCNC: 4.4 MMOL/L (ref 3.5–5.1)
PPD POC: NEGATIVE NEGATIVE
SODIUM SERPL-SCNC: 140 MMOL/L (ref 136–145)

## 2018-04-14 PROCEDURE — 36415 COLL VENOUS BLD VENIPUNCTURE: CPT | Performed by: PHYSICIAN ASSISTANT

## 2018-04-14 PROCEDURE — 97110 THERAPEUTIC EXERCISES: CPT

## 2018-04-14 PROCEDURE — 97150 GROUP THERAPEUTIC PROCEDURES: CPT

## 2018-04-14 PROCEDURE — 97165 OT EVAL LOW COMPLEX 30 MIN: CPT

## 2018-04-14 PROCEDURE — 74011250637 HC RX REV CODE- 250/637: Performed by: PHYSICIAN ASSISTANT

## 2018-04-14 PROCEDURE — 74011250636 HC RX REV CODE- 250/636: Performed by: PHYSICIAN ASSISTANT

## 2018-04-14 PROCEDURE — 94762 N-INVAS EAR/PLS OXIMTRY CONT: CPT

## 2018-04-14 PROCEDURE — 65270000029 HC RM PRIVATE

## 2018-04-14 PROCEDURE — 97535 SELF CARE MNGMENT TRAINING: CPT

## 2018-04-14 PROCEDURE — 85018 HEMOGLOBIN: CPT | Performed by: PHYSICIAN ASSISTANT

## 2018-04-14 PROCEDURE — 97161 PT EVAL LOW COMPLEX 20 MIN: CPT

## 2018-04-14 PROCEDURE — 97116 GAIT TRAINING THERAPY: CPT

## 2018-04-14 PROCEDURE — 74011250636 HC RX REV CODE- 250/636: Performed by: ORTHOPAEDIC SURGERY

## 2018-04-14 PROCEDURE — 80048 BASIC METABOLIC PNL TOTAL CA: CPT | Performed by: PHYSICIAN ASSISTANT

## 2018-04-14 RX ORDER — PROMETHAZINE HYDROCHLORIDE 25 MG/ML
6.25 INJECTION, SOLUTION INTRAMUSCULAR; INTRAVENOUS ONCE
Status: COMPLETED | OUTPATIENT
Start: 2018-04-14 | End: 2018-04-14

## 2018-04-14 RX ADMIN — HYDROMORPHONE HYDROCHLORIDE 2 MG: 2 TABLET ORAL at 18:14

## 2018-04-14 RX ADMIN — HYDROMORPHONE HYDROCHLORIDE 2 MG: 2 TABLET ORAL at 05:09

## 2018-04-14 RX ADMIN — Medication 5 ML: at 20:42

## 2018-04-14 RX ADMIN — ACETAMINOPHEN 1000 MG: 500 TABLET, FILM COATED ORAL at 05:09

## 2018-04-14 RX ADMIN — Medication 400 MG: at 08:11

## 2018-04-14 RX ADMIN — HYDROMORPHONE HYDROCHLORIDE 2 MG: 2 TABLET ORAL at 22:41

## 2018-04-14 RX ADMIN — CETIRIZINE HYDROCHLORIDE 5 MG: 5 TABLET ORAL at 19:00

## 2018-04-14 RX ADMIN — ACETAMINOPHEN 1000 MG: 500 TABLET, FILM COATED ORAL at 00:59

## 2018-04-14 RX ADMIN — HYDROMORPHONE HYDROCHLORIDE 2 MG: 2 TABLET ORAL at 01:00

## 2018-04-14 RX ADMIN — CELECOXIB 200 MG: 200 CAPSULE ORAL at 20:41

## 2018-04-14 RX ADMIN — ACETAMINOPHEN 1000 MG: 500 TABLET, FILM COATED ORAL at 14:35

## 2018-04-14 RX ADMIN — SENNOSIDES AND DOCUSATE SODIUM 2 TABLET: 8.6; 5 TABLET ORAL at 08:11

## 2018-04-14 RX ADMIN — Medication 2 G: at 05:01

## 2018-04-14 RX ADMIN — ONDANSETRON 4 MG: 2 INJECTION INTRAMUSCULAR; INTRAVENOUS at 08:10

## 2018-04-14 RX ADMIN — ASPIRIN 81 MG: 81 TABLET, COATED ORAL at 20:41

## 2018-04-14 RX ADMIN — HYDROMORPHONE HYDROCHLORIDE 2 MG: 2 TABLET ORAL at 14:35

## 2018-04-14 RX ADMIN — ASPIRIN 81 MG: 81 TABLET, COATED ORAL at 08:11

## 2018-04-14 RX ADMIN — PROMETHAZINE HYDROCHLORIDE 6.25 MG: 25 INJECTION INTRAMUSCULAR; INTRAVENOUS at 08:45

## 2018-04-14 RX ADMIN — CELECOXIB 200 MG: 200 CAPSULE ORAL at 08:11

## 2018-04-14 RX ADMIN — ACETAMINOPHEN 1000 MG: 500 TABLET, FILM COATED ORAL at 18:14

## 2018-04-14 NOTE — PROGRESS NOTES
04/13/18 2202   Oxygen Therapy   O2 Sat (%) 97 %   Pulse via Oximetry 76 beats per minute   O2 Device Room air   Patient placed on continuous sat monitor. Alarms set and data cleared. No distress noted at this time.

## 2018-04-14 NOTE — PROGRESS NOTES
Problem: Self Care Deficits Care Plan (Adult)  Goal: *Acute Goals and Plan of Care (Insert Text)  GOALS:   DISCHARGE GOALS (in preparation for going home/rehab):  3 days  1. Ms. Juan Aguilar will perform one lower body dressing activity with minimal assistance required to demonstrate improved functional mobility and safety. 2.  Ms. Juan Aguilar will perform one lower body bathing activity with minimal assistance required to demonstrate improved functional mobility and safety. 3.  Ms. Juan Aguilar will perform toileting/toilet transfer with contact guard assistance to demonstrate improved functional mobility and safety. 4.  Ms. Juan Aguilar will perform shower transfer with contact guard assistance to demonstrate improved functional mobility and safety. JOINT CAMP OCCUPATIONAL THERAPY TKA: Initial Assessment and AM 4/14/2018  INPATIENT: Hospital Day: 2  [place] / [place] / Product Type: Medicare /      NAME/AGE/GENDER: Leanna Navarro is a 76 y.o. female   PRIMARY DIAGNOSIS:  Primary osteoarthritis of right knee [M17.11]   Procedure(s) and Anesthesia Type:     * RIGHT KNEE ARTHROPLASTY TOTAL / [place] / FNB - Spinal (Right)  ICD-10: Treatment Diagnosis:    · Pain in Right Knee (M25.561)  · Stiffness of Right Knee, Not elsewhere classified (M25.661)  · Other lack of cordination (R27.8)      ASSESSMENT:     Ms. Juan Aguilar is s/p right TKA and presents with decreased weight bearing on right LE and decreased independence with functional mobility and activities of daily living. Patient would benefit from skilled Occupational Therapy to maximize independence and safety with self-care task and functional mobility. Pt would also benefit from education on adaptive equipment and safety precautions in preparation for going home or for recommendations for post-hospital rehab program.  Patient plans for further rehab at [facility] in [facility]. OT reviewed therapy schedule and plan of care with patient. Patient was able to transfer and preform self care skills as charted below. Patient instructed to call for assistance when needing to get up from the recliner and all needs in reach. Patient verbalized understanding of call light. This section established at most recent assessment   PROBLEM LIST (Impairments causing functional limitations):  1. Decreased Strength  2. Decreased ADL/Functional Activities  3. Decreased Transfer Abilities  4. Increased Pain  5. Increased Fatigue  6. Decreased Flexibility/Joint Mobility  7. Decreased Knowledge of Precautions   INTERVENTIONS PLANNED: (Benefits and precautions of occupational therapy have been discussed with the patient.)  1. Activities of daily living training  2. Adaptive equipment training  3. Balance training  4. Clothing management  5. Donning&doffing training  6. Theraputic activity     TREATMENT PLAN: Frequency/Duration: Follow patient 1 time to address above goals. Rehabilitation Potential For Stated Goals: Good     RECOMMENDED REHABILITATION/EQUIPMENT: (at time of discharge pending progress): Continue Skilled Therapy, Home Health: Physical Therapy and Rehab. OCCUPATIONAL PROFILE AND HISTORY:   History of Present Injury/Illness (Reason for Referral): Pt presents this date s/p (right) TKA. Past Medical History/Comorbidities:   Ms. Radha Zheng  has a past medical history of Environmental and seasonal allergies; GERD (gastroesophageal reflux disease); Neuropathy; Osteoporosis; Primary osteoarthritis of right knee; and Status post total right knee replacement (4/13/2018). Ms. Radha Zheng  has a past surgical history that includes hx tonsillectomy (1962); hx colonoscopy (2012); and hx tubal ligation (1978).   Social History/Living Environment:   Home Environment: Patient room  # Steps to Enter: 3  One/Two Story Residence: One story  Living Alone: No  Support Systems: Spouse/Significant Other/Partner  Patient Expects to be Discharged to[de-identified] Rehabilitation facility [facility] in [place])  Current DME Used/Available at Home: Crutches, Grab bars, Shower chair  Tub or Shower Type: Shower  Prior Level of Function/Work/Activity:  Mod I with ADLS     Number of Personal Factors/Comorbidities that affect the Plan of Care: Brief history (0):  LOW COMPLEXITY   ASSESSMENT OF OCCUPATIONAL PERFORMANCE[de-identified]   Most Recent Physical Functioning:   Balance  Sitting: Intact  Standing: Impaired;Pull to stand; With support                    Coordination  Fine Motor Skills-Upper: Left Intact; Right Intact  Gross Motor Skills-Upper: Left Intact; Right Intact         Mental Status  Neurologic State: Alert; Appropriate for age  Orientation Level: Appropriate for age  Cognition: Appropriate decision making; Appropriate for age attention/concentration; Appropriate safety awareness; Follows commands  Perception: Appears intact  Perseveration: No perseveration noted  Safety/Judgement: Awareness of environment; Fall prevention                Basic ADLs (From Assessment) Complex ADLs (From Assessment)   Basic ADL  Feeding: Setup  Oral Facial Hygiene/Grooming: Supervision  Bathing: Moderate assistance  Upper Body Dressing: Supervision  Lower Body Dressing: Moderate assistance  Toileting: Minimum assistance     Grooming/Bathing/Dressing Activities of Daily Living     Cognitive Retraining  Safety/Judgement: Awareness of environment; Fall prevention                 Functional Transfers  Toilet Transfer : Minimum assistance  Shower Transfer: Minimum assistance     Bed/Mat Mobility  Supine to Sit: Stand-by assistance  Sit to Stand: Minimum assistance  Bed to Chair: Minimum assistance  Scooting: Stand-by assistance         Physical Skills Involved:  1. Range of Motion  2. Balance  3. Strength Cognitive Skills Affected (resulting in the inability to perform in a timely and safe manner): 1. none Psychosocial Skills Affected:  1.  Social Roles   Number of elements that affect the Plan of Care: 3-5:  MODERATE COMPLEXITY CLINICAL DECISION MAKIN[address] [place] [address] AM-PAC 6 Clicks   Daily Activity Inpatient Short Form  How much help from another person does the patient currently need. .. Total A Lot A Little None   1. Putting on and taking off regular lower body clothing? [] 1   [x] 2   [] 3   [] 4   2. Bathing (including washing, rinsing, drying)? [] 1   [x] 2   [] 3   [] 4   3. Toileting, which includes using toilet, bedpan or urinal?   [] 1   [] 2   [x] 3   [] 4   4. Putting on and taking off regular upper body clothing? [] 1   [] 2   [x] 3   [] 4   5. Taking care of personal grooming such as brushing teeth? [] 1   [] 2   [x] 3   [] 4   6. Eating meals? [] 1   [] 2   [] 3   [x] 4   © , Trustees of [address] [place] [address], under license to [employer]. All rights reserved     Score:  Initial: 17 Most Recent: X (Date: -- )    Interpretation of Tool:  Represents activities that are increasingly more difficult (i.e. Bed mobility, Transfers, Gait). Score 24 23 22-20 19-15 14-10 9-7 6     Modifier CH CI CJ CK CL CM CN      ? Self Care:     - CURRENT STATUS: CK - 40%-59% impaired, limited or restricted    - GOAL STATUS: CJ - 20%-39% impaired, limited or restricted    - D/C STATUS:  ---------------To be determined---------------  [place] / [place] / Product Type: Medicare /      Medical Necessity:     · Patient is expected to demonstrate progress in balance, coordination and functional technique to decrease assistance required with self care and functional mobility and improve safety during self care and functional mobility. Reason for Services/Other Comments:  · Patient continues to require skilled intervention due to decreased self care and functional mobility.    Use of outcome tool(s) and clinical judgement create a POC that gives a: LOW COMPLEXITY            TREATMENT:   (In addition to Assessment/Re-Assessment sessions the following treatments were rendered) Pre-treatment Symptoms/Complaints:    Pain: Initial:   Pain Intensity 1: 2  Pain Location 1: Knee  Pain Orientation 1: Right  Pain Intervention(s) 1: Ambulation/Increased Activity  Post Session:  3/10 rest, iceman in place     Assessment/Reassessment only, no treatment provided today    Treatment/Session Assessment:     Response to Treatment:  Tolerated well, but feeling very nauseous plans to go to rehab . Education:  [] Home Exercises  [x] Fall Precautions  [] Hip Precautions [] Going Home Video  [x] Knee/Hip Prosthesis Review  [x] Walker Management/Safety [x] Adaptive Equipment as Needed       Interdisciplinary Collaboration:   o Occupational Therapist  o Registered Nurse  o Certified Nursing Assistant/Patient Care Technician  o PA present    After treatment position/precautions:   o Up in chair  o Bed/Chair-wheels locked  o Bed in low position  o Caregiver at bedside  o Call light within reach  o RN notified     Compliance with Program/Exercises: compliant all of the time. Recommendations/Intent for next treatment session:  Treatment next visit will focus on increasing Ms. Stephens's independence with bed mobility, transfers, self care, functional mobility, modalities for pain, and patient education.       Total Treatment Duration:  OT Patient Time In/Time Out  Time In: 0725  Time Out: [address] [place] [address], OT

## 2018-04-14 NOTE — PROGRESS NOTES
Patient resting quietly, alert and oriented, no distress noted with family at bedside. Right knee dressing c/d/i, glover with clear, yellow urine draining. Patient encouraged to use incentive spirometer. IV patent infusing fluids as ordered. Fresh ice placed in iceman. Neurovascular and peripheral vascular checks WNL. Bed low and locked position. Call light within reach. Patient instructed to call for assistance, verbalizes understanding. Nursing assessment complete.

## 2018-04-14 NOTE — PROGRESS NOTES
Problem: Mobility Impaired (Adult and Pediatric)  Goal: *Acute Goals and Plan of Care (Insert Text)  GOALS (1-4 days):  (1.)Ms. Shirley Luna will move from supine to sit and sit to supine  in bed with STAND BY ASSIST.  (2.)Ms. Shirley Luna will transfer from bed to chair and chair to bed with STAND BY ASSIST using the least restrictive device. (3.)Ms. Shirley Luna will ambulate with STAND BY ASSIST for 200 feet with the least restrictive device. Met 4/14/18  (4.)Ms. Shirley Luna will increase right knee ROM to 5°-80°.  ________________________________________________________________________________________________      PHYSICAL THERAPY [facility] tKa: Daily Note, Treatment Day: Day of Assessment, PM 4/14/2018  INPATIENT: Hospital Day: 2  [place] / Product Type: Medicare /      NAME/AGE/GENDER: Katerin Walker is a [address] [place] [address] y.o. female   PRIMARY DIAGNOSIS:  Primary osteoarthritis of right knee [M17.11]   Procedure(s) and Anesthesia Type:     * RIGHT KNEE ARTHROPLASTY TOTAL / Mendy Creed / [facility] - Spinal (Right)  ICD-10: Treatment Diagnosis:    · Pain in Right Knee (M25.561)  · Stiffness of Right Knee, Not elsewhere classified (M25.661)  · Difficulty in walking, Not elsewhere classified (R26.2)      ASSESSMENT:     Ms. Shirley Luna presents s/p R TKA. Patient demonstrates decreased R LE strength and ROM and decreased independence with bed mobility, transfers, and gait. Patient independent at baseline and would benefit from therapy to facilitate a return to prior level of function. Patient plans on going to rehab at d/c prior to returning home. Patient participated well with group session. Improved nausea and ambulated increased distance. Good demonstration of all exercises. Patient plans for rehab on Monday. This section established at most recent assessment   PROBLEM LIST (Impairments causing functional limitations):  1. Decreased Strength  2. Decreased ADL/Functional Activities  3.  Decreased Transfer Abilities  4. Decreased Ambulation Ability/Technique  5. Decreased Balance  6. Increased Pain  7. Decreased Flexibility/Joint Mobility  8. Decreased [place] with Home Exercise Program   INTERVENTIONS PLANNED: (Benefits and precautions of physical therapy have been discussed with the patient.)  1. Bed Mobility  2. Gait Training  3. Home Exercise Program (HEP)  4. Therapeutic Exercise/Strengthening  5. Transfer Training  6. Range of Motion: active/assisted/passive  7. Therapeutic Activities  8. Group Therapy     TREATMENT PLAN: Frequency/Duration: Follow patient BID for duration of hospital stay to address above goals. Rehabilitation Potential For Stated Goals: Good     RECOMMENDED REHABILITATION/EQUIPMENT: (at time of discharge pending progress): Continue Skilled Therapy and Rehab. HISTORY:   History of Present Injury/Illness (Reason for Referral):  R TKA 4/13/18  Past Medical History/Comorbidities:   Ms. Vangie Chang  has a past medical history of Environmental and seasonal allergies; GERD (gastroesophageal reflux disease); Neuropathy; Osteoporosis; Primary osteoarthritis of right knee; and Status post total right knee replacement (4/13/2018). Ms. Vangie Chang  has a past surgical history that includes hx tonsillectomy (1962); hx colonoscopy (2012); and hx tubal ligation (1978). Social History/Living Environment:   Home Environment: Private residence  # Steps to Enter: 3  Rails to Enter: No  One/Two Story Residence: One story  Living Alone: No  Support Systems: Spouse/Significant Other/Partner  Patient Expects to be Discharged to[de-identified] Rehabilitation facility  Current DME Used/Available at Home: Crutches, Grab bars, Shower chair  Tub or Shower Type: Shower  Prior Level of Function/Work/Activity:  Independent with mobility.    Number of Personal Factors/Comorbidities that affect the Plan of Care: 0: LOW COMPLEXITY   EXAMINATION:   Most Recent Physical Functioning:   Gross Assessment: Yes  Gross Assessment  AROM: Within functional limits (L LE)  Strength: Within functional limits (L LE)  Coordination: Generally decreased, functional  Tone: Normal  Sensation: Intact        RLE AROM  R Knee Flexion: 73  R Knee Extension: 10       RLE Strength  R Hip Flexion: 2+  R Knee Flexion: 2  R Knee Extension: 2    Bed Mobility  Supine to Sit: Stand-by assistance  Scooting: Stand-by assistance    Transfers  Sit to Stand: Stand-by assistance  Stand to Sit: Stand-by assistance  Bed to Chair: Minimum assistance    Balance  Sitting: Intact  Standing: With support              Weight Bearing Status  Right Side Weight Bearing: As tolerated  Distance (ft): 240 Feet (ft) (x 2)  Ambulation - Level of Assistance: Stand-by assistance  Assistive Device: Walker, rolling  Speed/Michela: Pace decreased (<100 feet/min)  Step Length: Left shortened  Stance: Right decreased  Gait Abnormalities: Antalgic  Interventions: Safety awareness training;Verbal cues     Braces/Orthotics: none    Right Knee Cold  Type: Cryocuff      Body Structures Involved:  1. Joints  2. Muscles Body Functions Affected:  1. Movement Related Activities and Participation Affected:  1. Mobility  2. Self Care   Number of elements that affect the Plan of Care: 4+: HIGH COMPLEXITY   CLINICAL PRESENTATION:   Presentation: Stable and uncomplicated: LOW COMPLEXITY   CLINICAL DECISION MAKIN [place] [address] AM-PAC 6 Clicks   Basic Mobility Inpatient Short Form  How much difficulty does the patient currently have. .. Unable A Lot A Little None   1. Turning over in bed (including adjusting bedclothes, sheets and blankets)? [] 1   [] 2   [x] 3   [] 4   2. Sitting down on and standing up from a chair with arms ( e.g., wheelchair, bedside commode, etc.)   [] 1   [] 2   [x] 3   [] 4   3. Moving from lying on back to sitting on the side of the bed? [] 1   [] 2   [x] 3   [] 4   How much help from another person does the patient currently need. .. Total A Lot A Little None   4.   Moving to and from a bed to a chair (including a wheelchair)? [] 1   [] 2   [x] 3   [] 4   5. Need to walk in hospital room? [] 1   [] 2   [x] 3   [] 4   6. Climbing 3-5 steps with a railing? [] 1   [] 2   [x] 3   [] 4   © 2007, Trustees of [address] [place] [address] [address] [address], under license to [employer]. All rights reserved        Score:  Initial: 18 Most Recent: X (Date: -- )    Interpretation of Tool:  Represents activities that are increasingly more difficult (i.e. Bed mobility, Transfers, Gait). Score 24 23 22-20 19-15 14-10 9-7 6     Modifier CH CI CJ CK CL CM CN      ? Mobility - Walking and Moving Around:     - CURRENT STATUS: CK - 40%-59% impaired, limited or restricted    - GOAL STATUS: CJ - 20%-39% impaired, limited or restricted    - D/C STATUS:  ---------------To be determined---------------  [place] / Product Type: Medicare /      Medical Necessity:     · Patient is expected to demonstrate progress in strength, range of motion, balance and coordination to increase independence with mobility and ADLs. · Patient demonstrates good rehab potential due to higher previous functional level. Reason for Services/Other Comments:  · Patient continues to require skilled intervention due to decreased R LE strength and ROM and decreased independence with mobility s/p TKA. Use of outcome tool(s) and clinical judgement create a POC that gives a: Clear prediction of patient's progress: LOW COMPLEXITY            TREATMENT:   (In addition to Assessment/Re-Assessment sessions the following treatments were rendered)     Pre-treatment Symptoms/Complaints:  Patient ready for group session.   Pain: Initial:   Pain Intensity 1: 5  Pain Location 1: Knee  Pain Orientation 1: Right  Pain Intervention(s) 1: Ambulation/Increased Activity, Cold pack, Exercise  Post Session:  5/10     Gait Training (15 Minutes):  Gait training to improve and/or restore physical functioning as related to mobility, balance and coordination. Ambulated 240 Feet (ft) (x 2) with Stand-by assistance using a Walker, rolling and minimal Safety awareness training;Verbal cues related to their stance phase and stride length to promote proper body alignment. Therapeutic Exercise: (45 Minutes (group)):  Exercises per grid below to improve mobility and strength. Required minimal verbal and tactile cues to promote proper body alignment. Progressed range, repetitions and complexity of movement as indicated. Date:  4/14/18 Date:   Date:     ACTIVITY/EXERCISE AM PM AM PM AM PM   GROUP THERAPY  []  [x]  []  []  []  []   Ankle Pumps 10 15       Quad Sets 10 15       Gluteal Sets 10 15       Hip ABd/ADduction 10 15       Straight Leg Raises 10 15       Knee Slides 10 15       Short Arc Quads 10 15       Long Arc Quads         Chair Slides  15                B = bilateral; AA = active assistive; A = active; P = passive      Treatment/Session Assessment:     Response to Treatment:  Patient participated well this afternoon. Good increase in gait distance and good demonstration of all exercises. Education:  [x] Home Exercises  [x] Fall Precautions  [] Hip Precautions [x] D/C Instruction Review  [x] Knee/Hip Prosthesis Review  [x] Walker Management/Safety [] Adaptive Equipment as Needed       Interdisciplinary Collaboration:   o Physical Therapist  o Physical Therapy Assistant  o Registered Nurse  o Rehabilitation Attendant    After treatment position/precautions:   o Up in chair  o Bed/Chair-wheels locked  o Call light within reach    Compliance with Program/Exercises: compliant all of the time. Recommendations/Intent for next treatment session:  Treatment next visit will focus on increasing Ms. Stephens's independence with bed mobility, transfers, gait training, strength/ROM exercises, modalities for pain, and patient education.       Total Treatment Duration:  PT Patient Time In/Time Out  Time In: 1300  Time Out: [facility] Jessica Zarate

## 2018-04-14 NOTE — PROGRESS NOTES
Problem: Self Care Deficits Care Plan (Adult)  Goal: *Acute Goals and Plan of Care (Insert Text)  GOALS:   DISCHARGE GOALS (in preparation for going home/rehab):  3 days  1. Ms. Morena Sanchez will perform one lower body dressing activity with minimal assistance required to demonstrate improved functional mobility and safety. GOAL MET 4/14/2018  2. Ms. Morena Sanchez will perform one lower body bathing activity with minimal assistance required to demonstrate improved functional mobility and safety. GOAL MET 4/14/2018  3. Ms. Morena Sanchez will perform toileting/toilet transfer with contact guard assistance to demonstrate improved functional mobility and safety. 4.  Ms. Morena Sanchez will perform shower transfer with contact guard assistance to demonstrate improved functional mobility and safety. GOAL MET 4/14/2018      JOINT CAMP OCCUPATIONAL THERAPY TKA: Daily Note, Discharge, Treatment Day: 1st and AM 4/14/2018  INPATIENT: Hospital Day: 2  [place] / Product Type: Medicare /      NAME/AGE/GENDER: Michele Grimes is a 76 y.o. female   PRIMARY DIAGNOSIS:  Primary osteoarthritis of right knee [M17.11]   Procedure(s) and Anesthesia Type:     * RIGHT KNEE ARTHROPLASTY TOTAL / Wilmon Fam / [facility]NB - Spinal (Right)  ICD-10: Treatment Diagnosis:    · Pain in Right Knee (M25.561)  · Stiffness of Right Knee, Not elsewhere classified (M25.661)  · Other lack of cordination (R27.8)      ASSESSMENT:      Ms. Morena Sanchez is s/p R TKA and presents with decreased weight bearing on R LE and decreased independence with functional mobility and activities of daily living. Patient completed shower and dressing as charter below in ADL grid and is ambulating with rolling walker and supervision. Patient has met 3/4 goals and plans to return home with good family support. Family able to provide patient with appropriate level of assistance at this time.   OT reviewed safety precautions throughout session and therapy schedule for the remainder of today. Patient instructed to call for assistance when needing to get up from recliner and all needs in reach. Patient verbalized understanding of call light. This section established at most recent assessment   PROBLEM LIST (Impairments causing functional limitations):  1. Decreased Strength  2. Decreased ADL/Functional Activities  3. Decreased Transfer Abilities  4. Increased Pain  5. Increased Fatigue  6. Decreased Flexibility/Joint Mobility  7. Decreased Knowledge of Precautions   INTERVENTIONS PLANNED: (Benefits and precautions of occupational therapy have been discussed with the patient.)  1. Activities of daily living training  2. Adaptive equipment training  3. Balance training  4. Clothing management  5. Donning&doffing training  6. Theraputic activity     TREATMENT PLAN: Frequency/Duration: Follow patient 1 time to address above goals. Rehabilitation Potential For Stated Goals: Good     RECOMMENDED REHABILITATION/EQUIPMENT: (at time of discharge pending progress): Continue Skilled Therapy, Home Health: Physical Therapy and Rehab. OCCUPATIONAL PROFILE AND HISTORY:   History of Present Injury/Illness (Reason for Referral): Pt presents this date s/p (right) TKA. Past Medical History/Comorbidities:   Ms. Rocky West  has a past medical history of Environmental and seasonal allergies; GERD (gastroesophageal reflux disease); Neuropathy; Osteoporosis; Primary osteoarthritis of right knee; and Status post total right knee replacement (4/13/2018). Ms. Rocky West  has a past surgical history that includes hx tonsillectomy (1962); hx colonoscopy (2012); and hx tubal ligation (1978).   Social History/Living Environment:   Home Environment: Patient room  # Steps to Enter: 3  One/Two Story Residence: One story  Living Alone: No  Support Systems: Spouse/Significant Other/Partner  Patient Expects to be Discharged to[de-identified] Rehabilitation facility ([facility] in [place])  Current DME Used/Available at Home: Crutches, Grab bars, Shower chair  Tub or Shower Type: Shower  Prior Level of Function/Work/Activity:  Mod I with ADLS     Number of Personal Factors/Comorbidities that affect the Plan of Care: Brief history (0):  LOW COMPLEXITY   ASSESSMENT OF OCCUPATIONAL PERFORMANCE[de-identified]   Most Recent Physical Functioning:   Balance  Sitting: Intact  Standing: With support                    Coordination  Fine Motor Skills-Upper: Left Intact; Right Intact  Gross Motor Skills-Upper: Left Intact; Right Intact         Mental Status  Neurologic State: Alert  Orientation Level: Oriented X4  Cognition: Appropriate decision making; Appropriate for age attention/concentration  Perception: Appears intact  Perseveration: No perseveration noted  Safety/Judgement: Fall prevention                Basic ADLs (From Assessment) Complex ADLs (From Assessment)   Basic ADL  Feeding: Setup  Oral Facial Hygiene/Grooming: Supervision  Bathing: Moderate assistance  Type of Bath: Full, Shower, Chlorhexidine (CHG)  Upper Body Dressing: Supervision  Lower Body Dressing:  Moderate assistance  Toileting: Minimum assistance     Grooming/Bathing/Dressing Activities of Daily Living     Cognitive Retraining  Safety/Judgement: Fall prevention   Upper Body Bathing  Bathing Assistance: Modified independent  Position Performed: Seated in chair  Adaptive Equipment: Shower chair     Lower Body Bathing  Bathing Assistance: Modified independent  Perineal  : Independent  Lower Body : Modified independent  Adaptive Equipment: Shower chair     Upper Body Dressing Assistance  Dressing Assistance: Independent  Bra: [address]: Independent Functional Transfers  Toilet Transfer : Minimum assistance  Shower Transfer: Supervision   Lower Body Dressing Assistance  Dressing Assistance: Minimum assistance  Underpants: Supervision/set-up  Pants With Elastic Waist: Supervision/set-up  Socks: Minimum assistance  Cues: Doff;Don;Physical assistance Bed/Mat Mobility  Supine to Sit: Stand-by assistance  Sit to Stand: Supervision  Bed to Chair: Minimum assistance  Scooting: Stand-by assistance         Physical Skills Involved:  1. Range of Motion  2. Balance  3. Strength Cognitive Skills Affected (resulting in the inability to perform in a timely and safe manner): 1. none Psychosocial Skills Affected:  1. Social Roles   Number of elements that affect the Plan of Care: 3-5:  MODERATE COMPLEXITY   CLINICAL DECISION MAKIN[address] [place] [address] AM-PAC 6 Clicks   Daily Activity Inpatient Short Form  How much help from another person does the patient currently need. .. Total A Lot A Little None   1. Putting on and taking off regular lower body clothing? [] 1   [x] 2   [] 3   [] 4   2. Bathing (including washing, rinsing, drying)? [] 1   [x] 2   [] 3   [] 4   3. Toileting, which includes using toilet, bedpan or urinal?   [] 1   [] 2   [x] 3   [] 4   4. Putting on and taking off regular upper body clothing? [] 1   [] 2   [x] 3   [] 4   5. Taking care of personal grooming such as brushing teeth? [] 1   [] 2   [x] 3   [] 4   6. Eating meals? [] 1   [] 2   [] 3   [x] 4   © , Trustees of [address] [place] [address], under license to [employer]. All rights reserved     Score:  Initial: 17 Most Recent: X (Date: -- )    Interpretation of Tool:  Represents activities that are increasingly more difficult (i.e. Bed mobility, Transfers, Gait). Score 24 23 22-20 19-15 14-10 9-7 6     Modifier CH CI CJ CK CL CM CN      ?  Self Care:     - CURRENT STATUS: CK - 40%-59% impaired, limited or restricted    - GOAL STATUS: CJ - 20%-39% impaired, limited or restricted    - D/C STATUS:  ---------------To be determined---------------  [place] / Product Type: Medicare /        ·                 TREATMENT:   (In addition to Assessment/Re-Assessment sessions the following treatments were rendered)     Pre-treatment Symptoms/Complaints:    Pain: Initial:   Pain Intensity 1: 0  Post Session:  3/10 rest, iceman in place     Self Care: (23): Procedure(s) (per grid) utilized to improve and/or restore self-care/home management as related to dressing and bathing. Required min verbal and manual cueing to facilitate activities of daily living skills. Treatment/Session Assessment:     Response to Treatment:  Tolerated well, but feeling very nauseous plans to go to rehab . Education:  [] Home Exercises  [x] Fall Precautions  [] Hip Precautions [] Going Home Video  [x] Knee/Hip Prosthesis Review  [x] Walker Management/Safety [x] Adaptive Equipment as Needed       Interdisciplinary Collaboration:   o Certified Occupational Therapy Assistant  o Registered Nurse    After treatment position/precautions:   o Up in chair  o Bed/Chair-wheels locked  o Bed in low position  o Caregiver at bedside  o Call light within reach  o RN notified     Compliance with Program/Exercises: compliant all of the time. Recommendations/Intent for next treatment session: Pt progressing with Occupational Therapy and has met 3/4 goals. Patient plans for  continue therapy services at a short term rehabilitation facility. Discharge acute care Occupational Therapy services.       Total Treatment Duration: 23  OT Patient Time In/Time Out  Time In: 1020  Time Out: [address]

## 2018-04-14 NOTE — PROGRESS NOTES
IV zofran given for nausea per order. Pt had episodes of vomiting 30 minutes later. 6.25 phenergan given deep IM.

## 2018-04-14 NOTE — PROGRESS NOTES
Care Management Interventions  Transition of Care Consult (CM Consult): Discharge Planning  Current Support Network: Lives with Spouse  Discharge Location  Discharge Placement: Skilled nursing facility (referral to [facility] [address] [place] [address])

## 2018-04-14 NOTE — PROGRESS NOTES
Problem: Mobility Impaired (Adult and Pediatric)  Goal: *Acute Goals and Plan of Care (Insert Text)  GOALS (1-4 days):  (1.)Ms. Ronel Mccullough will move from supine to sit and sit to supine  in bed with STAND BY ASSIST.  (2.)Ms. Ronel Mccullough will transfer from bed to chair and chair to bed with STAND BY ASSIST using the least restrictive device. (3.)Ms. Ronel Mccullough will ambulate with STAND BY ASSIST for 200 feet with the least restrictive device. (4.)Ms. Ronel Mccullough will increase right knee ROM to 5°-80°.  ________________________________________________________________________________________________      PHYSICAL THERAPY Joint camp tKa: Initial Assessment, Treatment Day: Day of Assessment, AM 4/14/2018  INPATIENT: Hospital Day: 2  [place] / Product Type: Medicare /      NAME/AGE/GENDER: Jacque Salinas is a 76 y.o. female   PRIMARY DIAGNOSIS:  Primary osteoarthritis of right knee [M17.11]   Procedure(s) and Anesthesia Type:     * RIGHT KNEE ARTHROPLASTY TOTAL / Hernan Salome / [facility] - Spinal (Right)  ICD-10: Treatment Diagnosis:    · Pain in Right Knee (M25.561)  · Stiffness of Right Knee, Not elsewhere classified (M25.661)  · Difficulty in walking, Not elsewhere classified (R26.2)      ASSESSMENT:     Ms. Ronel Mccullough presents s/p R TKA. Patient demonstrates decreased R LE strength and ROM and decreased independence with bed mobility, transfers, and gait. Patient independent at baseline and would benefit from therapy to facilitate a return to prior level of function. Patient plans on going to rehab at d/c prior to returning home. This section established at most recent assessment   PROBLEM LIST (Impairments causing functional limitations):  1. Decreased Strength  2. Decreased ADL/Functional Activities  3. Decreased Transfer Abilities  4. Decreased Ambulation Ability/Technique  5. Decreased Balance  6. Increased Pain  7. Decreased Flexibility/Joint Mobility  8.  Decreased [place] with Home Exercise Program   INTERVENTIONS PLANNED: (Benefits and precautions of physical therapy have been discussed with the patient.)  1. Bed Mobility  2. Gait Training  3. Home Exercise Program (HEP)  4. Therapeutic Exercise/Strengthening  5. Transfer Training  6. Range of Motion: active/assisted/passive  7. Therapeutic Activities  8. Group Therapy     TREATMENT PLAN: Frequency/Duration: Follow patient BID for duration of hospital stay to address above goals. Rehabilitation Potential For Stated Goals: Good     RECOMMENDED REHABILITATION/EQUIPMENT: (at time of discharge pending progress): Continue Skilled Therapy and Rehab. HISTORY:   History of Present Injury/Illness (Reason for Referral):  R TKA 4/13/18  Past Medical History/Comorbidities:   Ms. Carla Gan  has a past medical history of Environmental and seasonal allergies; GERD (gastroesophageal reflux disease); Neuropathy; Osteoporosis; Primary osteoarthritis of right knee; and Status post total right knee replacement (4/13/2018). Ms. Carla Gan  has a past surgical history that includes hx tonsillectomy (1962); hx colonoscopy (2012); and hx tubal ligation (1978). Social History/Living Environment:   Home Environment: Private residence  # Steps to Enter: 3  Rails to Enter: No  One/Two Story Residence: One story  Living Alone: No  Support Systems: Spouse/Significant Other/Partner  Patient Expects to be Discharged to[de-identified] Rehabilitation facility  Current DME Used/Available at Home: Crutches, Grab bars, Shower chair  Tub or Shower Type: Shower  Prior Level of Function/Work/Activity:  Independent with mobility. Number of Personal Factors/Comorbidities that affect the Plan of Care: 0: LOW COMPLEXITY   EXAMINATION:   Most Recent Physical Functioning:   Gross Assessment: Yes  Gross Assessment  AROM: Within functional limits (L LE)  Strength:  Within functional limits (L LE)  Coordination: Generally decreased, functional  Tone: Normal  Sensation: Intact                RLE Strength  R Hip Flexion: 2+  R Knee Flexion: 2  R Knee Extension: 2    Bed Mobility  Supine to Sit: Stand-by assistance  Scooting: Stand-by assistance    Transfers  Sit to Stand: Stand-by assistance  Stand to Sit: Stand-by assistance  Bed to Chair: Minimum assistance    Balance  Sitting: Intact  Standing: With support              Weight Bearing Status  Right Side Weight Bearing: As tolerated  Distance (ft): 30 Feet (ft)  Ambulation - Level of Assistance: Stand-by assistance;Contact guard assistance  Assistive Device: Walker, rolling  Speed/Michela: Pace decreased (<100 feet/min)  Step Length: Left shortened  Stance: Right decreased  Gait Abnormalities: Antalgic  Interventions: Safety awareness training;Verbal cues     Braces/Orthotics: none    Right Knee Cold  Type: Cryocuff      Body Structures Involved:  1. Joints  2. Muscles Body Functions Affected:  1. Movement Related Activities and Participation Affected:  1. Mobility  2. Self Care   Number of elements that affect the Plan of Care: 4+: HIGH COMPLEXITY   CLINICAL PRESENTATION:   Presentation: Stable and uncomplicated: LOW COMPLEXITY   CLINICAL DECISION MAKIN [facility] [address] [id number] AM-PAC 6 Clicks   Basic Mobility Inpatient Short Form  How much difficulty does the patient currently have. .. Unable A Lot A Little None   1. Turning over in bed (including adjusting bedclothes, sheets and blankets)? [] 1   [] 2   [x] 3   [] 4   2. Sitting down on and standing up from a chair with arms ( e.g., wheelchair, bedside commode, etc.)   [] 1   [] 2   [x] 3   [] 4   3. Moving from lying on back to sitting on the side of the bed? [] 1   [] 2   [x] 3   [] 4   How much help from another person does the patient currently need. .. Total A Lot A Little None   4. Moving to and from a bed to a chair (including a wheelchair)? [] 1   [] 2   [x] 3   [] 4   5. Need to walk in hospital room? [] 1   [] 2   [x] 3   [] 4   6. Climbing 3-5 steps with a railing?    [] 1   [] 2 [x] 3   [] 4   © 2007, Trustees of [address] [place] [address] [address] [id number], under license to [employer]. All rights reserved        Score:  Initial: 18 Most Recent: X (Date: -- )    Interpretation of Tool:  Represents activities that are increasingly more difficult (i.e. Bed mobility, Transfers, Gait). Score 24 23 22-20 19-15 14-10 9-7 6     Modifier CH CI CJ CK CL CM CN      ? Mobility - Walking and Moving Around:     - CURRENT STATUS: CK - 40%-59% impaired, limited or restricted    - GOAL STATUS: CJ - 20%-39% impaired, limited or restricted    - D/C STATUS:  ---------------To be determined---------------  [place] / Product Type: Medicare /      Medical Necessity:     · Patient is expected to demonstrate progress in strength, range of motion, balance and coordination to increase independence with mobility and ADLs. · Patient demonstrates good rehab potential due to higher previous functional level. Reason for Services/Other Comments:  · Patient continues to require skilled intervention due to decreased R LE strength and ROM and decreased independence with mobility s/p TKA. Use of outcome tool(s) and clinical judgement create a POC that gives a: Clear prediction of patient's progress: LOW COMPLEXITY            TREATMENT:   (In addition to Assessment/Re-Assessment sessions the following treatments were rendered)     Pre-treatment Symptoms/Complaints:  Patient complains of nausea. Pain: Initial:   Pain Intensity 1: 3  Pain Location 1: Knee  Pain Orientation 1: Right  Pain Intervention(s) 1: Ambulation/Increased Activity, Cold pack, Exercise  Post Session:  3/10     Gait Training ( ):  Gait training to improve and/or restore physical functioning as related to mobility, balance and coordination.   Ambulated 30 Feet (ft) with Stand-by assistance;Contact guard assistance using a Walker, rolling and minimal Safety awareness training;Verbal cues related to their stance phase and stride length to promote proper body alignment. Therapeutic Exercise: (15 Minutes):  Exercises per grid below to improve mobility and strength. Required minimal verbal and tactile cues to promote proper body alignment. Progressed range, repetitions and complexity of movement as indicated. Date:  4/14/18 Date:   Date:     ACTIVITY/EXERCISE AM PM AM PM AM PM   GROUP THERAPY  []  []  []  []  []  []   Ankle Pumps 10        Quad Sets 10        Gluteal Sets 10        Hip ABd/ADduction 10        Straight Leg Raises 10        Knee Slides 10        Short Arc Quads 10        Long Arc Quads         Chair Slides                  B = bilateral; AA = active assistive; A = active; P = passive      Treatment/Session Assessment:     Response to Treatment:  Patient participated well this am.  Did not want to walk further secondary to nausea. Reportedly stated \"I'll do more when I'm feeling better\". Education:  [x] Home Exercises  [x] Fall Precautions  [] Hip Precautions [] D/C Instruction Review  [] Knee/Hip Prosthesis Review  [x] Walker Management/Safety [] Adaptive Equipment as Needed       Interdisciplinary Collaboration:   o Physical Therapist  o Registered Nurse    After treatment position/precautions:   o Up in chair  o Bed/Chair-wheels locked  o Call light within reach    Compliance with Program/Exercises: compliant all of the time. Recommendations/Intent for next treatment session:  Treatment next visit will focus on increasing Ms. Stephens's independence with bed mobility, transfers, gait training, strength/ROM exercises, modalities for pain, and patient education.       Total Treatment Duration:  PT Patient Time In/Time Out  Time In: 0945  Time Out: KAITLYN Correa

## 2018-04-14 NOTE — PROGRESS NOTES
2018         Post Op day: 1 Day Post-Op     Admit Date: 2018  Admit Diagnosis: Primary osteoarthritis of right knee [M17.11]        Subjective: Doing well, No complaints, No SOB, No Chest Pain, did have some nausea this morning. no Vomiting     Objective:   Vital Signs are Stable, No Acute Distress, Alert and Oriented, Dressing is Dry,  Neurovascular exam is normal.     Assessment / Plan :  Patient Active Problem List   Diagnosis Code    Snoring R06.83    Status post total right knee replacement Z96.651    Patient Vitals for the past 8 hrs:   BP Temp Pulse Resp SpO2   18 0459 114/67 98 °F (36.7 °C) 73 16 95 %   18 0055 116/71 97.8 °F (36.6 °C) 69 16 96 %    Temp (24hrs), Av.8 °F (36.6 °C), Min:97.5 °F (36.4 °C), Max:98.5 °F (36.9 °C)    Body mass index is 25.33 kg/(m^2). Lab Results   Component Value Date/Time    HGB 10.9 (L) 2018 04:31 AM      Pt seen by and discussed with [facility] to try phenergan or zofran for nausea. Will monitor. May change pain medication if it persists.   Planning for rehab placement       Signed By: ADRIANA Umana

## 2018-04-15 LAB
HGB BLD-MCNC: 9.3 G/DL (ref 11.7–15.4)
MM INDURATION POC: 0 MM (ref 0–5)
PPD POC: NORMAL NEGATIVE

## 2018-04-15 PROCEDURE — 94760 N-INVAS EAR/PLS OXIMETRY 1: CPT

## 2018-04-15 PROCEDURE — 97110 THERAPEUTIC EXERCISES: CPT

## 2018-04-15 PROCEDURE — 36415 COLL VENOUS BLD VENIPUNCTURE: CPT | Performed by: PHYSICIAN ASSISTANT

## 2018-04-15 PROCEDURE — 74011250637 HC RX REV CODE- 250/637: Performed by: FAMILY MEDICINE

## 2018-04-15 PROCEDURE — 65270000029 HC RM PRIVATE

## 2018-04-15 PROCEDURE — 74011250637 HC RX REV CODE- 250/637: Performed by: PHYSICIAN ASSISTANT

## 2018-04-15 PROCEDURE — 97116 GAIT TRAINING THERAPY: CPT

## 2018-04-15 PROCEDURE — 97150 GROUP THERAPEUTIC PROCEDURES: CPT

## 2018-04-15 PROCEDURE — 85018 HEMOGLOBIN: CPT | Performed by: PHYSICIAN ASSISTANT

## 2018-04-15 RX ORDER — MAG HYDROX/ALUMINUM HYD/SIMETH 200-200-20
30 SUSPENSION, ORAL (FINAL DOSE FORM) ORAL
Status: DISCONTINUED | OUTPATIENT
Start: 2018-04-15 | End: 2018-04-16 | Stop reason: HOSPADM

## 2018-04-15 RX ADMIN — ACETAMINOPHEN 1000 MG: 500 TABLET, FILM COATED ORAL at 23:22

## 2018-04-15 RX ADMIN — CELECOXIB 200 MG: 200 CAPSULE ORAL at 07:58

## 2018-04-15 RX ADMIN — ALUMINUM HYDROXIDE, MAGNESIUM HYDROXIDE, AND SIMETHICONE 30 ML: 200; 200; 20 SUSPENSION ORAL at 20:14

## 2018-04-15 RX ADMIN — ASPIRIN 81 MG: 81 TABLET, COATED ORAL at 07:57

## 2018-04-15 RX ADMIN — HYDROMORPHONE HYDROCHLORIDE 2 MG: 2 TABLET ORAL at 03:27

## 2018-04-15 RX ADMIN — SENNOSIDES AND DOCUSATE SODIUM 2 TABLET: 8.6; 5 TABLET ORAL at 07:56

## 2018-04-15 RX ADMIN — Medication 10 ML: at 14:00

## 2018-04-15 RX ADMIN — Medication 5 ML: at 03:27

## 2018-04-15 RX ADMIN — Medication 5 ML: at 20:14

## 2018-04-15 RX ADMIN — HYDROMORPHONE HYDROCHLORIDE 2 MG: 2 TABLET ORAL at 23:22

## 2018-04-15 RX ADMIN — ACETAMINOPHEN 1000 MG: 500 TABLET, FILM COATED ORAL at 18:00

## 2018-04-15 RX ADMIN — ACETAMINOPHEN 1000 MG: 500 TABLET, FILM COATED ORAL at 03:26

## 2018-04-15 RX ADMIN — HYDROMORPHONE HYDROCHLORIDE 2 MG: 2 TABLET ORAL at 07:58

## 2018-04-15 RX ADMIN — ACETAMINOPHEN 1000 MG: 500 TABLET, FILM COATED ORAL at 12:06

## 2018-04-15 RX ADMIN — ASPIRIN 81 MG: 81 TABLET, COATED ORAL at 20:13

## 2018-04-15 RX ADMIN — Medication 400 MG: at 07:57

## 2018-04-15 RX ADMIN — CETIRIZINE HYDROCHLORIDE 5 MG: 5 TABLET ORAL at 19:00

## 2018-04-15 RX ADMIN — CELECOXIB 200 MG: 200 CAPSULE ORAL at 20:13

## 2018-04-15 NOTE — PROGRESS NOTES
Problem: Mobility Impaired (Adult and Pediatric)  Goal: *Acute Goals and Plan of Care (Insert Text)  GOALS (1-4 days):  (1.)Ms. Sven Thakkar will move from supine to sit and sit to supine  in bed with STAND BY ASSIST.  (2.)Ms. Sven Thakkar will transfer from bed to chair and chair to bed with STAND BY ASSIST using the least restrictive device. (3.)Ms. Sven Thakkar will ambulate with STAND BY ASSIST for 200 feet with the least restrictive device. Met 4/14/18  (4.)Ms. Sven Thakkar will increase right knee ROM to 5°-80°.  ________________________________________________________________________________________________      PHYSICAL THERAPY Joint camp tKa: Daily Note, AM 4/15/2018  INPATIENT: Hospital Day: 3  [place] / Product Type: Medicare /      NAME/AGE/GENDER: Francisco Dyson is a 76 y.o. female   PRIMARY DIAGNOSIS:  Primary osteoarthritis of right knee [M17.11]   Procedure(s) and Anesthesia Type:     * RIGHT KNEE ARTHROPLASTY TOTAL / Annmarie Ax / FNB - Spinal (Right)  ICD-10: Treatment Diagnosis:    · Pain in Right Knee (M25.561)  · Stiffness of Right Knee, Not elsewhere classified (M25.661)  · Difficulty in walking, Not elsewhere classified (R26.2)      ASSESSMENT:     Ms. Sven Thakkar presents s/p R TKA. Patient demonstrates decreased R LE strength and ROM and decreased independence with bed mobility, transfers, and gait. Patient independent at baseline and would benefit from therapy to facilitate a return to prior level of function. Patient plans on going to rehab at d/c prior to returning home. Patient participated well with group session. Continuing to ambulate with SBA only. Does need repetitive cueing to increase step length with L LE. Able to perform exercises without assist.    This section established at most recent assessment   PROBLEM LIST (Impairments causing functional limitations):  1. Decreased Strength  2. Decreased ADL/Functional Activities  3.  Decreased Transfer Abilities  4. Decreased Ambulation Ability/Technique  5. Decreased Balance  6. Increased Pain  7. Decreased Flexibility/Joint Mobility  8. Decreased [place] with Home Exercise Program   INTERVENTIONS PLANNED: (Benefits and precautions of physical therapy have been discussed with the patient.)  1. Bed Mobility  2. Gait Training  3. Home Exercise Program (HEP)  4. Therapeutic Exercise/Strengthening  5. Transfer Training  6. Range of Motion: active/assisted/passive  7. Therapeutic Activities  8. Group Therapy     TREATMENT PLAN: Frequency/Duration: Follow patient BID for duration of hospital stay to address above goals. Rehabilitation Potential For Stated Goals: Good     RECOMMENDED REHABILITATION/EQUIPMENT: (at time of discharge pending progress): Continue Skilled Therapy and Rehab. HISTORY:   History of Present Injury/Illness (Reason for Referral):  R TKA 4/13/18  Past Medical History/Comorbidities:   Ms. Shirley Luna  has a past medical history of Environmental and seasonal allergies; GERD (gastroesophageal reflux disease); Neuropathy; Osteoporosis; Primary osteoarthritis of right knee; and Status post total right knee replacement (4/13/2018). Ms. Shirley Luna  has a past surgical history that includes hx tonsillectomy (1962); hx colonoscopy (2012); and hx tubal ligation (1978). Social History/Living Environment:   Home Environment: Private residence  # Steps to Enter: 3  Rails to Enter: No  One/Two Story Residence: One story  Living Alone: No  Support Systems: Spouse/Significant Other/Partner  Patient Expects to be Discharged to[de-identified] Rehabilitation facility  Current DME Used/Available at Home: Crutches, Grab bars, Shower chair  Tub or Shower Type: Shower  Prior Level of Function/Work/Activity:  Independent with mobility.    Number of Personal Factors/Comorbidities that affect the Plan of Care: 0: LOW COMPLEXITY   EXAMINATION:   Most Recent Physical Functioning:      Gross Assessment  AROM: Within functional limits (L LE)  Strength: Within functional limits (L LE)  Coordination: Within functional limits  Tone: Normal  Sensation: Intact        RLE AROM  R Knee Flexion: 72  R Knee Extension: 8       RLE Strength  R Hip Flexion: 2+  R Knee Flexion: 2+  R Knee Extension: 2+         Transfers  Sit to Stand: Stand-by assistance  Stand to Sit: Stand-by assistance    Balance  Sitting: Intact  Standing: Pull to stand; With support              Weight Bearing Status  Right Side Weight Bearing: As tolerated  Distance (ft): 240 Feet (ft) (x 2)  Ambulation - Level of Assistance: Stand-by assistance  Assistive Device: Walker, rolling  Speed/Michela: Pace decreased (<100 feet/min)  Step Length: Left shortened  Stance: Right decreased  Gait Abnormalities: Antalgic  Interventions: Safety awareness training;Verbal cues     Braces/Orthotics: none    Right Knee Cold  Type: Cryocuff      Body Structures Involved:  1. Joints  2. Muscles Body Functions Affected:  1. Movement Related Activities and Participation Affected:  1. Mobility  2. Self Care   Number of elements that affect the Plan of Care: 4+: HIGH COMPLEXITY   CLINICAL PRESENTATION:   Presentation: Stable and uncomplicated: LOW COMPLEXITY   CLINICAL DECISION MAKIN[address] [place] [address]-PAC 6 Clicks   Basic Mobility Inpatient Short Form  How much difficulty does the patient currently have. .. Unable A Lot A Little None   1. Turning over in bed (including adjusting bedclothes, sheets and blankets)? [] 1   [] 2   [x] 3   [] 4   2. Sitting down on and standing up from a chair with arms ( e.g., wheelchair, bedside commode, etc.)   [] 1   [] 2   [x] 3   [] 4   3. Moving from lying on back to sitting on the side of the bed? [] 1   [] 2   [x] 3   [] 4   How much help from another person does the patient currently need. .. Total A Lot A Little None   4. Moving to and from a bed to a chair (including a wheelchair)? [] 1   [] 2   [x] 3   [] 4   5. Need to walk in hospital room? [] 1   [] 2   [x] 3   [] 4   6. Climbing 3-5 steps with a railing? [] 1   [] 2   [x] 3   [] 4   © 2007, Trustees of [facility], under license to [employer]. All rights reserved        Score:  Initial: 18 Most Recent: X (Date: -- )    Interpretation of Tool:  Represents activities that are increasingly more difficult (i.e. Bed mobility, Transfers, Gait). Score 24 23 22-20 19-15 14-10 9-7 6     Modifier CH CI CJ CK CL CM CN      ? Mobility - Walking and Moving Around:     - CURRENT STATUS: CK - 40%-59% impaired, limited or restricted    - GOAL STATUS: CJ - 20%-39% impaired, limited or restricted    - D/C STATUS:  ---------------To be determined---------------  [place] / Product Type: Medicare /      Medical Necessity:     · Patient is expected to demonstrate progress in strength, range of motion, balance and coordination to increase independence with mobility and ADLs. · Patient demonstrates good rehab potential due to higher previous functional level. Reason for Services/Other Comments:  · Patient continues to require skilled intervention due to decreased R LE strength and ROM and decreased independence with mobility s/p TKA. Use of outcome tool(s) and clinical judgement create a POC that gives a: Clear prediction of patient's progress: LOW COMPLEXITY            TREATMENT:   (In addition to Assessment/Re-Assessment sessions the following treatments were rendered)     Pre-treatment Symptoms/Complaints:  Patient ready for group session. Pain: Initial:   Pain Intensity 1: 4  Pain Location 1: Knee  Pain Orientation 1: Right  Pain Intervention(s) 1: Ambulation/Increased Activity, Cold pack, Exercise  Post Session:  4/10     Gait Training (15 Minutes):  Gait training to improve and/or restore physical functioning as related to mobility, balance and coordination.   Ambulated 240 Feet (ft) (x 2) with Stand-by assistance using a Walker, rolling and minimal Safety awareness training;Verbal cues related to their stance phase and stride length to promote proper body alignment. Therapeutic Exercise: (45 Minutes (group)):  Exercises per grid below to improve mobility and strength. Required minimal verbal and tactile cues to promote proper body alignment. Progressed range, repetitions and complexity of movement as indicated. Date:  4/14/18 Date:  4/15/18 Date:     ACTIVITY/EXERCISE AM PM AM PM AM PM   GROUP THERAPY  []  [x]  [x]  []  []  []   Ankle Pumps 10 15 20      Quad Sets 10 15 20      Gluteal Sets 10 15 20      Hip ABd/ADduction 10 15 20      Straight Leg Raises 10 15 20      Knee Slides 10 15 20      Short Arc Quads 10 15 20      Long Arc Quads         Chair Slides  15 20               B = bilateral; AA = active assistive; A = active; P = passive      Treatment/Session Assessment:     Response to Treatment:  Patient participated well this morning. Needing repetitive cues to increase step length with L LE. Education:  [x] Home Exercises  [x] Fall Precautions  [] Hip Precautions [x] D/C Instruction Review  [x] Knee/Hip Prosthesis Review  [x] Walker Management/Safety [] Adaptive Equipment as Needed       Interdisciplinary Collaboration:   o Physical Therapist  o Physical Therapy Assistant  o Registered Nurse  o Rehabilitation Attendant    After treatment position/precautions:   o Up in chair  o Bed/Chair-wheels locked  o Call light within reach    Compliance with Program/Exercises: compliant all of the time. Recommendations/Intent for next treatment session:  Treatment next visit will focus on increasing Ms. Stephens's independence with bed mobility, transfers, gait training, strength/ROM exercises, modalities for pain, and patient education.       Total Treatment Duration:  PT Patient Time In/Time Out  Time In: 1030  Time Out: 2500 Andrew Castorena PT

## 2018-04-15 NOTE — PROGRESS NOTES
Problem: Mobility Impaired (Adult and Pediatric)  Goal: *Acute Goals and Plan of Care (Insert Text)  GOALS (1-4 days):  (1.)Ms. TAMMIE Edmondson will move from supine to sit and sit to supine  in bed with STAND BY ASSIST.  (2.)Ms. TAMMIE Edmondson will transfer from bed to chair and chair to bed with STAND BY ASSIST using the least restrictive device. (3.)Ms. TAMMIE Edmondson will ambulate with STAND BY ASSIST for 200 feet with the least restrictive device. Met 4/14/18  (4.)Ms. TAMMIE Edmondson will increase right knee ROM to 5°-80°.  ________________________________________________________________________________________________      PHYSICAL THERAPY [facility]: Daily Note, PM 4/15/2018  INPATIENT: Hospital Day: 3  [place] / [place] / Product Type: Medicare /      NAME/AGE/GENDER: Francisco Dyson is a 76 y.o. female   PRIMARY DIAGNOSIS:  Primary osteoarthritis of right knee [M17.11]   Procedure(s) and Anesthesia Type:     * RIGHT KNEE ARTHROPLASTY TOTAL / Annmarie Ax / [facility]NB - Spinal (Right)  ICD-10: Treatment Diagnosis:    · Pain in Right Knee (M25.561)  · Stiffness of Right Knee, Not elsewhere classified (M25.661)  · Difficulty in walking, Not elsewhere classified (R26.2)      ASSESSMENT:     Ms. TAMMIE Edmondson presents s/p R TKA. Patient demonstrates decreased R LE strength and ROM and decreased independence with bed mobility, transfers, and gait. Patient independent at baseline and would benefit from therapy to facilitate a return to prior level of function. Patient plans on going to rehab at d/c prior to returning home. Patient participated well with 2nd therapy session. Slight improvement in step through with L LE but still shortened and difficulty clearing foot from ground. Able to perform all exercises without assist.  Plans for rehab tomorrow. This section established at most recent assessment   PROBLEM LIST (Impairments causing functional limitations):  1. Decreased Strength  2.  Decreased ADL/Functional Activities  3. Decreased Transfer Abilities  4. Decreased Ambulation Ability/Technique  5. Decreased Balance  6. Increased Pain  7. Decreased Flexibility/Joint Mobility  8. Decreased [place] with Home Exercise Program   INTERVENTIONS PLANNED: (Benefits and precautions of physical therapy have been discussed with the patient.)  1. Bed Mobility  2. Gait Training  3. Home Exercise Program (HEP)  4. Therapeutic Exercise/Strengthening  5. Transfer Training  6. Range of Motion: active/assisted/passive  7. Therapeutic Activities  8. Group Therapy     TREATMENT PLAN: Frequency/Duration: Follow patient BID for duration of hospital stay to address above goals. Rehabilitation Potential For Stated Goals: Good     RECOMMENDED REHABILITATION/EQUIPMENT: (at time of discharge pending progress): Continue Skilled Therapy and Rehab. HISTORY:   History of Present Injury/Illness (Reason for Referral):  R TKA 4/13/18  Past Medical History/Comorbidities:   Ms. Madhu Nina  has a past medical history of Environmental and seasonal allergies; GERD (gastroesophageal reflux disease); Neuropathy; Osteoporosis; Primary osteoarthritis of right knee; and Status post total right knee replacement (4/13/2018). Ms. Mdahu Nina  has a past surgical history that includes hx tonsillectomy (1962); hx colonoscopy (2012); and hx tubal ligation (1978). Social History/Living Environment:   Home Environment: Private residence  # Steps to Enter: 3  Rails to Enter: No  One/Two Story Residence: One story  Living Alone: No  Support Systems: Spouse/Significant Other/Partner  Patient Expects to be Discharged to[de-identified] Rehabilitation facility  Current DME Used/Available at Home: Crutches, Grab bars, Shower chair  Tub or Shower Type: Shower  Prior Level of Function/Work/Activity:  Independent with mobility.    Number of Personal Factors/Comorbidities that affect the Plan of Care: 0: LOW COMPLEXITY   EXAMINATION:   Most Recent Physical Functioning:      Gross Assessment  AROM: Within functional limits (L LE)  Strength: Within functional limits (L LE)  Coordination: Within functional limits  Tone: Normal  Sensation: Intact        RLE AROM  R Knee Flexion: 72  R Knee Extension: 8       RLE Strength  R Hip Flexion: 2+  R Knee Flexion: 2+  R Knee Extension: 2+         Transfers  Sit to Stand: Supervision  Stand to Sit: Supervision    Balance  Sitting: Intact  Standing: Pull to stand; With support              Weight Bearing Status  Right Side Weight Bearing: As tolerated  Distance (ft): 350 Feet (ft)  Ambulation - Level of Assistance: Supervision  Assistive Device: Walker, rolling  Speed/Michela: Pace decreased (<100 feet/min)  Step Length: Left shortened  Stance: Right decreased  Gait Abnormalities: Antalgic;Decreased step clearance  Interventions: Safety awareness training;Verbal cues     Braces/Orthotics: none    Right Knee Cold  Type: Cryocuff      Body Structures Involved:  1. Joints  2. Muscles Body Functions Affected:  1. Movement Related Activities and Participation Affected:  1. Mobility  2. Self Care   Number of elements that affect the Plan of Care: 4+: HIGH COMPLEXITY   CLINICAL PRESENTATION:   Presentation: Stable and uncomplicated: LOW COMPLEXITY   CLINICAL DECISION MAKIN[address] [place] [address] AM-PAC 6 Clicks   Basic Mobility Inpatient Short Form  How much difficulty does the patient currently have. .. Unable A Lot A Little None   1. Turning over in bed (including adjusting bedclothes, sheets and blankets)? [] 1   [] 2   [x] 3   [] 4   2. Sitting down on and standing up from a chair with arms ( e.g., wheelchair, bedside commode, etc.)   [] 1   [] 2   [x] 3   [] 4   3. Moving from lying on back to sitting on the side of the bed? [] 1   [] 2   [x] 3   [] 4   How much help from another person does the patient currently need. .. Total A Lot A Little None   4. Moving to and from a bed to a chair (including a wheelchair)? [] 1   [] 2   [x] 3   [] 4   5. Need to walk in hospital room? [] 1   [] 2   [x] 3   [] 4   6. Climbing 3-5 steps with a railing? [] 1   [] 2   [x] 3   [] 4   © 2007, Trustees of [address] [place] [address] [address] [address], under license to [employer]. All rights reserved        Score:  Initial: 18 Most Recent: X (Date: -- )    Interpretation of Tool:  Represents activities that are increasingly more difficult (i.e. Bed mobility, Transfers, Gait). Score 24 23 22-20 19-15 14-10 9-7 6     Modifier CH CI CJ CK CL CM CN      ? Mobility - Walking and Moving Around:     - CURRENT STATUS: CK - 40%-59% impaired, limited or restricted    - GOAL STATUS: CJ - 20%-39% impaired, limited or restricted    - D/C STATUS:  ---------------To be determined---------------  [place] / [place] / Product Type: Medicare /      Medical Necessity:     · Patient is expected to demonstrate progress in strength, range of motion, balance and coordination to increase independence with mobility and ADLs. · Patient demonstrates good rehab potential due to higher previous functional level. Reason for Services/Other Comments:  · Patient continues to require skilled intervention due to decreased R LE strength and ROM and decreased independence with mobility s/p TKA. Use of outcome tool(s) and clinical judgement create a POC that gives a: Clear prediction of patient's progress: LOW COMPLEXITY            TREATMENT:   (In addition to Assessment/Re-Assessment sessions the following treatments were rendered)     Pre-treatment Symptoms/Complaints:  Patient ready for 2nd therapy session. Pain: Initial:   Pain Intensity 1: 5  Pain Location 1: Knee  Pain Orientation 1: Right  Pain Intervention(s) 1: Ambulation/Increased Activity, Cold pack, Exercise  Post Session:  5/10     Gait Training (10 Minutes):  Gait training to improve and/or restore physical functioning as related to mobility, balance and coordination.   Ambulated 350 Feet (ft) with Supervision using a Walker, rolling and minimal Safety awareness training;Verbal cues related to their stance phase and stride length to promote proper body alignment. Therapeutic Exercise: (15 Minutes):  Exercises per grid below to improve mobility and strength. Required minimal verbal and tactile cues to promote proper body alignment. Progressed range, repetitions and complexity of movement as indicated. Date:  4/14/18 Date:  4/15/18 Date:     ACTIVITY/EXERCISE AM PM AM PM AM PM   GROUP THERAPY  []  [x]  [x]  []  []  []   Ankle Pumps 10 15 20 20     Quad Sets 10 15 20 20     Gluteal Sets 10 15 20 20     Hip ABd/ADduction 10 15 20 20     Straight Leg Raises 10 15 20 20     Knee Slides 10 15 20 20     Short Arc Quads 10 15 20 20     Long Arc Quads         Chair Slides  15 20 20              B = bilateral; AA = active assistive; A = active; P = passive      Treatment/Session Assessment:     Response to Treatment:  Patient participated well again this afternoon. Moving well but difficulty clearing L foot during swing. Education:  [x] Home Exercises  [x] Fall Precautions  [] Hip Precautions [x] D/C Instruction Review  [x] Knee/Hip Prosthesis Review  [x] Walker Management/Safety [] Adaptive Equipment as Needed       Interdisciplinary Collaboration:   o Physical Therapist  o Registered Nurse    After treatment position/precautions:   o Up in chair  o Bed/Chair-wheels locked  o Call light within reach    Compliance with Program/Exercises: compliant all of the time. Recommendations/Intent for next treatment session:  Treatment next visit will focus on increasing Ms. Stephens's independence with bed mobility, transfers, gait training, strength/ROM exercises, modalities for pain, and patient education.       Total Treatment Duration:  PT Patient Time In/Time Out  Time In: 1320  Time Out: 3655 Saúl Bernardo

## 2018-04-15 NOTE — PROGRESS NOTES
Sitting up on the recliner chair,,no complaints of pain at this time,,given scheduled dose of ES Tylenol (1000 mgs.),, also given prune juice as requested (4 oz. ). Betha Lute ..

## 2018-04-15 NOTE — PROGRESS NOTES
Ambulated to the bathroom with walker use,,no assistance,,voided,  No BM yet but passing flatus,,patient was given two (4 oz.) prune juice as requested,,back on the recliner chair,,pain level=-/10. .. Courtney Chapman

## 2018-04-15 NOTE — PROGRESS NOTES
Patient resting quietly with family in room, alert and oriented, no distress noted. Right knee dressing c/d/i, voiding clear yellow urine, ambulates with walker. Fresh ice placed in iceman. Neurovascular and peripheral vascular checks WNL. Bed low and locked position. Call light within reach. Patient instructed to call for assistance, verbalizes understanding. Nursing assessment complete.

## 2018-04-15 NOTE — PROGRESS NOTES
Sitting up on the recliner chair at this time,,assisted with a.mFlorenceshower by PCT,,,patient  In no distress so far. ...

## 2018-04-15 NOTE — PROGRESS NOTES
In bed resting quietly,,stated pain level=4-5/10,,assessment completed,,dorsiflexing both feet well,,Aqua naresh dressing om right knee intact,clean and dry with iceman on site,,took all p.o.medications plus one tablet Dilaudid (2mgs. )Call or return to clinic prn if these symptoms worsen or fail to improve as anticipated. For pain. .. Gwendolyn Hernandez

## 2018-04-15 NOTE — PROGRESS NOTES
Patient had a large BM,formed,soft,,also assisted in taking a shower,,,back in bed and positioned for comfort,,iceman on right knee,,call bell within reach. ...

## 2018-04-15 NOTE — PROGRESS NOTES
April 15, 2018         Post Op day: 2 Days Post-Op     Admit Date: 2018  Admit Diagnosis: Primary osteoarthritis of right knee [M17.11]        Subjective: Doing well, No complaints, No SOB, No Chest Pain, No Nausea or Vomiting     Objective:   Vital Signs are Stable, No Acute Distress, Alert and Oriented, Dressing is Dry,  Neurovascular exam is normal.     Assessment / Plan :  Patient Active Problem List   Diagnosis Code    Snoring R06.83    Status post total right knee replacement Z96.651    Patient Vitals for the past 8 hrs:   BP Temp Pulse Resp SpO2   04/15/18 0335 137/79 98.1 °F (36.7 °C) 67 18 98 %    Temp (24hrs), Av °F (36.7 °C), Min:97.6 °F (36.4 °C), Max:98.3 °F (36.8 °C)    Body mass index is 25.33 kg/(m^2).     Lab Results   Component Value Date/Time    HGB 9.3 (L) 04/15/2018 04:20 AM      Pt seen by and discussed with Supervising Physician   Continue PT  Awaiting rehab placement     Signed By: ADRIANA Umana

## 2018-04-15 NOTE — PROGRESS NOTES
04/14/18 2054   Oxygen Therapy   O2 Sat (%) 96 %   Pulse via Oximetry 70 beats per minute   O2 Device Room air   Patient placed on continuous sat monitor. Alarms set and data cleared. No distress noted at this time.

## 2018-04-15 NOTE — PROGRESS NOTES
Sitting up on bedside chair plucking her eyebrows,,very alert,no complaints of pain or shortness of breath. .....

## 2018-04-16 VITALS
HEART RATE: 72 BPM | TEMPERATURE: 97.7 F | BODY MASS INDEX: 25.34 KG/M2 | DIASTOLIC BLOOD PRESSURE: 81 MMHG | OXYGEN SATURATION: 96 % | HEIGHT: 63 IN | SYSTOLIC BLOOD PRESSURE: 134 MMHG | WEIGHT: 143 LBS | RESPIRATION RATE: 18 BRPM

## 2018-04-16 LAB
HGB BLD-MCNC: 9.9 G/DL (ref 11.7–15.4)
MM INDURATION POC: 0 MM (ref 0–5)
PPD POC: NORMAL NEGATIVE

## 2018-04-16 PROCEDURE — 74011250637 HC RX REV CODE- 250/637: Performed by: PHYSICIAN ASSISTANT

## 2018-04-16 PROCEDURE — 97150 GROUP THERAPEUTIC PROCEDURES: CPT

## 2018-04-16 PROCEDURE — 97116 GAIT TRAINING THERAPY: CPT

## 2018-04-16 PROCEDURE — 85018 HEMOGLOBIN: CPT | Performed by: PHYSICIAN ASSISTANT

## 2018-04-16 PROCEDURE — 36415 COLL VENOUS BLD VENIPUNCTURE: CPT | Performed by: PHYSICIAN ASSISTANT

## 2018-04-16 PROCEDURE — 99221 1ST HOSP IP/OBS SF/LOW 40: CPT | Performed by: PHYSICAL MEDICINE & REHABILITATION

## 2018-04-16 RX ADMIN — Medication 400 MG: at 08:40

## 2018-04-16 RX ADMIN — ACETAMINOPHEN 1000 MG: 500 TABLET, FILM COATED ORAL at 12:26

## 2018-04-16 RX ADMIN — SENNOSIDES AND DOCUSATE SODIUM 2 TABLET: 8.6; 5 TABLET ORAL at 08:40

## 2018-04-16 RX ADMIN — ACETAMINOPHEN 1000 MG: 500 TABLET, FILM COATED ORAL at 05:11

## 2018-04-16 RX ADMIN — Medication 5 ML: at 05:12

## 2018-04-16 RX ADMIN — HYDROMORPHONE HYDROCHLORIDE 2 MG: 2 TABLET ORAL at 09:55

## 2018-04-16 RX ADMIN — CELECOXIB 200 MG: 200 CAPSULE ORAL at 08:40

## 2018-04-16 RX ADMIN — HYDROMORPHONE HYDROCHLORIDE 2 MG: 2 TABLET ORAL at 05:11

## 2018-04-16 RX ADMIN — ASPIRIN 81 MG: 81 TABLET, COATED ORAL at 08:40

## 2018-04-16 NOTE — PROGRESS NOTES
2018         Post Op day: 3 Days Post-OpProcedure(s) (LRB):  RIGHT KNEE ARTHROPLASTY TOTAL / [place] / FNB (Right)      Admit Date: 2018  Admit Diagnosis: Primary osteoarthritis of right knee [M17.11]    LAB:    Recent Results (from the past 24 hour(s))   PLEASE READ & DOCUMENT PPD TEST IN 48 HRS    Collection Time: 04/15/18  8:40 AM   Result Value Ref Range    PPD  Negative    mm Induration 0 mm   HEMOGLOBIN    Collection Time: 18  5:09 AM   Result Value Ref Range    HGB 9.9 (L) 11.7 - 15.4 g/dL     Vital Signs:    Patient Vitals for the past 8 hrs:   BP Temp Pulse Resp SpO2   18 0509 122/71 97.2 °F (36.2 °C) 76 18 97 %     Temp (24hrs), Av.7 °F (36.5 °C), Min:97.2 °F (36.2 °C), Max:98.1 °F (36.7 °C)    Body mass index is 25.33 kg/(m^2). Pain Control:   Pain Assessment  Pain Scale 1: Visual  Pain Intensity 1: 0  Pain Onset 1: at rest  Pain Location 1: Knee  Pain Orientation 1: Right  Pain Description 1: Aching  Pain Intervention(s) 1: Medication (see MAR)    Subjective: Doing well, No complaints, No SOB, No Chest Pain, No Nausea or Vomitting     Objective: Vital Signs are Stable, No Acute Distress, Alert and Oriented, Dressing is Dry,  Neurovascular exam is normal.       PT/OT:            Assistive Device: Walker (comment)  RLE AROM  R Knee Flexion: 72  R Knee Extension: 8             Weight Bearing Status: WBAT    Meds:  [unfilled]  [unfilled]  [unfilled]    Assessment:   Patient Active Problem List   Diagnosis Code    Snoring R06.83    Status post total right knee replacement Z96.975             Plan: Continue Physical Therapy, Monitor  Hbg, rehab today.         Signed By: Bradly Duane., MD

## 2018-04-16 NOTE — PROGRESS NOTES
Patient resting quietly, alert and oriented, no distress noted. Right knee dressing c/d/i, voiding clear yellow urine, ambulates with walker. Neurovascular and peripheral vascular checks WNL. Fresh ice placed in iceman. Bed low and locked position. Call light within reach. Patient instructed to call for assistance, verbalizes understanding. Nursing assessment complete.

## 2018-04-16 NOTE — PROGRESS NOTES
In bed,awale,alert,no complaints of pain,,assessment completed,,dorsiflexing both feet well,,Aqua naresh dressing intact,clean and dry with iceman on right knee,, in to see patient. .. Jonnie Russell

## 2018-04-16 NOTE — ROUTINE PROCESS
[facility] onMrs. Anita Brandon who is being transferred to the [facility] rehab.facility today for routine progression of care. .... Report on Situation,Background,Assessment,and Recommendation (SBAR). .... The following report also provided. ...... OR summary,,,Intake and Output,,,,MAR's,,,Lab. result. .... Opportunity for questions and for clarifications also provided to receving nurse. .. Bertha Perdue

## 2018-04-16 NOTE — PROGRESS NOTES
Assisted with a.m.care,,sitting up on the recliner chair at this time,,IV access taken out of left wrist site,,right knee dressing intact,clean and dry,,was given one tablet Dilaudid (2mgs. ),prn for pain prior to gym therapy session,, in to see patient. ...

## 2018-04-16 NOTE — PROGRESS NOTES
Discharged per wheelchair,stable condition,,accompanied by her  who is taking her to [address] [place] [address],[address] for routine progression of care. ..... Zoe Dey

## 2018-04-16 NOTE — CONSULTS
Physical Medicine & Rehabilitation Note-consult    Patient: Jacque Salinas MRN: [id number]  SSN: [id number]    YOB: 1950  Age: 76 y.o. Sex: female      Admit Date: 4/13/2018  Admitting Physician: Ainsley Acevedo MD    Medical Decision Making/Plan/Recommend: Gait impairment and functional deficits following right total knee arthroplasty. Patient is progressing well, and shows no major barriers to progress. Patient plans for SNF discharge. Best care option is admission to a sub acute rehab program at [facility]. She will benefit from continued daily skilled rehabilitation efforts and regular medical and nursing care at SNF. Continue PT, OT for active/assisted/passive right TKA ROM, strengthening, mobility, transfers, gait training. Will follow progress. Chief Complaint : Gait dysfunction secondary to below. Admit Diagnosis: Primary osteoarthritis of right knee [M17.11]  right total knee arthroplasty  4/13/2018  Pain  DVT risk  Post op hemorrhagic anemia  Osteoporosis  Neuropathy  Acute Rehab Dx:  Gait impairment  Mobility and ambulation deficits  Self Care/ADL deficits    Medical Dx:  Past Medical History:   Diagnosis Date    Environmental and seasonal allergies     GERD (gastroesophageal reflux disease)     diet controlled     Neuropathy     bilateral lower extremities     Osteoporosis     Primary osteoarthritis of right knee     Status post total right knee replacement 4/13/2018     Subjective:     Date of Evaluation:  April 16, 2018    HPI: Jacque Salinas is a 76 y.o. female patient at [address] [place] [address] who was admitted on 4/13/2018  by Ainsley Acevedo MD with below mentioned medical history, is being seen for Physical Medicine and Rehabilitation consult. Jacque Salinas had right knee pain and discomfort with walking and activity due to end stage DJD. The symptoms were not well controlled with conservative management and has limited the patient's function.  She underwent a right total knee arthroplasty per Dr. Jose Weir MD on 4/13/2018. The patient's post operative course has been uncomplicated. Patient's weight bearing status is to be WBAT RLE. Patient is starting to stand, taking steps with acute PT and OT. Patient still shows significant functional deficits due to right knee pain, decreased ROM and strength. We are consulted to assist with rehab needs and placement. Baldo Ogden is seen and examined today. Medical Records reviewed. Pt denies any history of DVT/PE. She denies any other pre morbid functional deficits. She has been independent with ambulation, prior to admission, limited by right knee pain. Current Level of Function:  bed mobility - SBA, transfers - SBA, decreased balance , ambulation - supervision 300' with RW     Prior Level of Function/Work/Activity:  Independent with mobility. Family History   Problem Relation Age of Onset   [facility] Alzheimer Mother     Cancer Father       Social History   Substance Use Topics    Smoking status: Never Smoker    Smokeless tobacco: Never Used    Alcohol use No     Past Surgical History:   Procedure Laterality Date    HX COLONOSCOPY  2012    HX TONSILLECTOMY  1962    HX TUBAL LIGATION  1978      Prior to Admission medications    Medication Sig Start Date End Date Taking? Authorizing Provider   aspirin delayed-release 81 mg tablet Take 1 Tab by mouth every twelve (12) hours for 35 days. 4/13/18 5/18/18 Yes ADRIANA Fuller   HYDROmorphone (DILAUDID) 2 mg tablet Take 1 Tab by mouth every four (4) hours as needed for Pain. Max Daily Amount: 12 mg. 4/13/18  Yes ADRIANA Fuller   ergocalciferol (ERGOCALCIFEROL) 50,000 unit capsule Take 50,000 Units by mouth. Twice a week   Yes Historical Provider   calcium-vitamin D (OYSTER SHELL) 500 mg(1,250mg) -200 unit per tablet Take 1 Tab by mouth daily. Yes Historical Provider   aspirin delayed-release 81 mg tablet Take 81 mg by mouth daily.    Yes Historical Provider   traMADol (ULTRAM) 50 mg tablet Take 50 mg by mouth every six (6) hours as needed. 18   Historical Provider   magnesium oxide (MAG-OX) 400 mg tablet Take 400 mg by mouth daily. Historical Provider     No Known Allergies     Review of Systems: +right knee pain, +antalgic gait. Denies chest pain, shortness of breath, cough, headache, visual problems, abdominal pain, dysurea, calf pain. Pertinent positives are as noted in the medical records and unremarkable otherwise. Objective:     Vitals:  Blood pressure 134/81, pulse 72, temperature 97.7 °F (36.5 °C), resp. rate 18, height 5' 3\" (1.6 m), weight 143 lb (64.9 kg), SpO2 96 %, not currently breastfeeding. Temp (24hrs), Av.7 °F (36.5 °C), Min:97.2 °F (36.2 °C), Max:98.1 °F (36.7 °C)    BMI (calculated): 25.3 (18 0952)   Intake and Output:   1901 -  0700  In: 240 [P.O.:240]  Out: -     Physical Exam:   General: Alert and age appropriately oriented. No acute cardio respiratory distress. HEENT: Normocephalic, no conjunctival pallor, no scleral icterus  Oral mucosa moist without cyanosis, no JVD   Lungs: Clear to auscultation bilaterally. Respiration even and unlabored   Heart: Regular rate and rhythm, S1, S2   No  murmurs, clicks, rub or gallops   Abdomen: Soft, non-tender, non-distended. Genitourinary: defered   Neuromuscular:      Grossly no focal motor deficits. Right knee extension strong  Right ankle dorsiflexion 5/5  Right ankle plantarflexion 5/5  No sensory deficits distally BLE to soft touch. Skin/extremity: No calf tenderness BLE. No rashes, no marginal erythema.                                                                                          Labs/Studies:  Recent Results (from the past 72 hour(s))   HEMOGLOBIN    Collection Time: 18  7:46 PM   Result Value Ref Range    HGB 11.5 (L) 11.7 - 15.4 g/dL   HEMOGLOBIN    Collection Time: 18  4:31 AM   Result Value Ref Range    HGB 10.9 (L) 11.7 - 48.8 g/dL   METABOLIC PANEL, BASIC    Collection Time: 04/14/18  4:31 AM   Result Value Ref Range    Sodium 140 136 - 145 mmol/L    Potassium 4.4 3.5 - 5.1 mmol/L    Chloride 106 98 - 107 mmol/L    CO2 29 21 - 32 mmol/L    Anion gap 5 (L) 7 - 16 mmol/L    Glucose 120 (H) 65 - 100 mg/dL    BUN 12 8 - 23 MG/DL    Creatinine 0.68 0.6 - 1.0 MG/DL    GFR est AA >60 >60 ml/min/1.73m2    GFR est non-AA >60 >60 ml/min/1.73m2    Calcium 8.7 8.3 - 10.4 MG/DL   PLEASE READ & DOCUMENT PPD TEST IN 24 HRS    Collection Time: 04/14/18 10:00 AM   Result Value Ref Range    PPD Negative Negative    mm Induration 0 mm   HEMOGLOBIN    Collection Time: 04/15/18  4:20 AM   Result Value Ref Range    HGB 9.3 (L) 11.7 - 15.4 g/dL   PLEASE READ & DOCUMENT PPD TEST IN 48 HRS    Collection Time: 04/15/18  8:40 AM   Result Value Ref Range    PPD  Negative    mm Induration 0 mm   HEMOGLOBIN    Collection Time: 04/16/18  5:09 AM   Result Value Ref Range    HGB 9.9 (L) 11.7 - 15.4 g/dL   PLEASE READ & DOCUMENT PPD TEST IN 72 HRS    Collection Time: 04/16/18  9:00 AM   Result Value Ref Range    PPD  Negative    mm Induration 0 mm       Functional Assessment:  Reviewed participation and progress in therapies  Gross Assessment  AROM: Within functional limits (L LE) (04/15/18 1200)  Strength: Within functional limits (L LE) (04/15/18 1200)  Coordination: Within functional limits (04/15/18 1200)  Tone: Normal (04/15/18 1200)  Sensation: Intact (04/15/18 1200)   Bed Mobility  Supine to Sit: Stand-by assistance (04/14/18 0725)  Scooting: Stand-by assistance (04/14/18 0725)   Balance  Sitting: Intact (04/15/18 1300)  Standing: Pull to stand; With support (04/15/18 1300)               Bed/Mat Mobility  Supine to Sit: Stand-by assistance (04/14/18 0725)  Sit to Stand: Supervision (04/15/18 1300)  Bed to Chair: Minimum assistance (04/14/18 0725)  Scooting: Stand-by assistance (04/14/18 0725)     Ambulation:  Gait  Speed/Michela: Pace decreased (<100 feet/min) (04/15/18 1300)  Step Length: Left shortened (04/15/18 1300)  Stance: Right decreased (04/15/18 1300)  Gait Abnormalities: Antalgic;Decreased step clearance (04/15/18 1300)  Ambulation - Level of Assistance: Supervision (04/15/18 1300)  Distance (ft): 350 Feet (ft) (04/15/18 1300)  Assistive Device: Walker, rolling (04/15/18 1300)  Interventions: Safety awareness training;Verbal cues (04/15/18 1300)  Duration: 10 Minutes (04/15/18 1300)    Impression/Plan:     Principal Problem:    Status post total right knee replacement (4/13/2018)        Current Facility-Administered Medications   Medication Dose Route Frequency Provider Last Rate Last Dose    alum-mag hydroxide-simeth (MYLANTA) oral suspension 30 mL  30 mL Oral Q4H PRN Biju Crump MD   30 mL at 04/15/18 2014    magnesium oxide (MAG-OX) tablet 400 mg  400 mg Oral DAILY ADRIANA Bazzi   400 mg at 04/16/18 0840    sodium chloride (NS) flush 5-10 mL  5-10 mL IntraVENous Q8H ADRIANA Bazzi   10 mL at 04/15/18 1400    sodium chloride (NS) flush 5-10 mL  5-10 mL IntraVENous PRN ADRIANA Bazzi   5 mL at 04/16/18 0512    acetaminophen (TYLENOL) tablet 1,000 mg  1,000 mg Oral Q6H ADRIANA Bazzi   1,000 mg at 04/16/18 0511    celecoxib (CELEBREX) capsule 200 mg  200 mg Oral Q12H ADRIANA Bazzi   200 mg at 04/16/18 0840    HYDROmorphone (DILAUDID) tablet 2 mg  2 mg Oral Q4H PRN ADRIANA Bazzi   2 mg at 04/16/18 0955    HYDROmorphone (PF) (DILAUDID) injection 1 mg  1 mg IntraVENous Q3H PRN ADRIANA Bazzi        naloxone [facility]) injection 0.2-0.4 mg  0.2-0.4 mg IntraVENous Q10MIN PRN ADRIANA Bazzi        ondansetron [facility]) injection 4 mg  4 mg IntraVENous Q4H PRN ADRIANA Bazzi   4 mg at 04/14/18 0810    diphenhydrAMINE (BENADRYL) capsule 25 mg  25 mg Oral Q4H PRN ADRIANA Bazzi        senna-docusate (PERICOLACE) 8.6-50 mg per tablet 2 Tab  2 Tab Oral DAILY ADRIANA Bazzi   2 Tab at 04/16/18 0840    aspirin delayed-release tablet 81 mg  81 mg Oral Q12H ADRIANA Fontana   81 mg at 04/16/18 0840    cetirizine (ZYRTEC) tablet 5 mg (Patient Supplied)  5 mg Oral DAILY Kerri Villafana MD   5 mg at 04/15/18 1900        Recommendations: Recommend sub acute rehab at [facility]. Continue Acute Rehab Program.  Coordination of rehab/medical care. Counseling of Physical Medicine & Rehab care issues management. Monitoring and management of rehab conditions per the plan of care/orders. Will follow along with you for PM&R issues and provide you follow up. Will follow with SW/ /Admissions Coordinators regarding insurance approvals and acceptance. Rehabilitation Management/ Medical Management: 1. Devices:Walkers, Type: Rolling Walker  2. Consult:Rehab team including PT, OT,  and . 3. Disposition Rehab-discussed with patient. 4. Thigh-high or knee-high LIZA's when out of bed. 5. DVT Prophylaxis - aspirin 81mg bid x 30days. 6. Incentive spirometer Q1H while awake  7. Post op hemorrhagic anemia-monitor. 8. Activity: WBAT RLE  9. Planned Labs: CBC,BMP  10. Pain Control: Adequately controlled with scheduled tylenol, celebrex and  PRN meds. Continue current management. 11. Wound Care: Keep right TKA wound clean and dry and reinforce dressing PRN. May remove Aquacel 1 week post op ad replace with new one. Remove staples 12-14 post surgery, when incision appears appropriately closed and apply benzoin and 1/2\" steristrips. Follow up with ORTHO per instructions. Thank you for the opportunity to participate in the care of this patient.     Signed By: Jackeline Thompson MD     April 16, 2018

## 2018-04-16 NOTE — PROGRESS NOTES
Problem: Mobility Impaired (Adult and Pediatric)  Goal: *Acute Goals and Plan of Care (Insert Text)  GOALS (1-4 days):  (1.)Ms. Ronel Mccullough will move from supine to sit and sit to supine  in bed with STAND BY ASSIST.  (2.)Ms. Ronel Mccullough will transfer from bed to chair and chair to bed with STAND BY ASSIST using the least restrictive device. (3.)Ms. Ronel Mccullough will ambulate with STAND BY ASSIST for 200 feet with the least restrictive device. Met 4/14/18  (4.)Ms. Ronel Mccullough will increase right knee ROM to 5°-80°.  ________________________________________________________________________________________________      PHYSICAL THERAPY [facility] tKa: Daily Note, PM 4/16/2018  INPATIENT: Hospital Day: 4  [place] / [place] / Product Type: Medicare /      NAME/AGE/GENDER: Jacque Salinas is a 76 y.o. female   PRIMARY DIAGNOSIS:  Primary osteoarthritis of right knee [M17.11]   Procedure(s) and Anesthesia Type:     * RIGHT KNEE ARTHROPLASTY TOTAL / WAUKESHA CTY MENTAL [facility] - Spinal (Right)  ICD-10: Treatment Diagnosis:    · Pain in Right Knee (M25.561)  · Stiffness of Right Knee, Not elsewhere classified (M25.661)  · Difficulty in walking, Not elsewhere classified (R26.2)      ASSESSMENT:     Ms. Ronel Mccullough presents s/p R TKA. Patient demonstrates decreased R LE strength and ROM and decreased independence with bed mobility, transfers, and gait. Patient independent at baseline and would benefit from therapy to facilitate a return to prior level of function. Patient plans on going to rehab at d/c prior to returning home. Patient participated well with 2nd therapy session. Slight improvement in step through with L LE but still shortened and difficulty clearing foot from ground. Able to perform all exercises without assist.  Plans for rehab. Pt progressing with ambulation. Pt plans to go to rehab in [place] today.     This section established at most recent assessment   PROBLEM LIST (Impairments causing functional limitations):  1. Decreased Strength  2. Decreased ADL/Functional Activities  3. Decreased Transfer Abilities  4. Decreased Ambulation Ability/Technique  5. Decreased Balance  6. Increased Pain  7. Decreased Flexibility/Joint Mobility  8. Decreased [place] with Home Exercise Program   INTERVENTIONS PLANNED: (Benefits and precautions of physical therapy have been discussed with the patient.)  1. Bed Mobility  2. Gait Training  3. Home Exercise Program (HEP)  4. Therapeutic Exercise/Strengthening  5. Transfer Training  6. Range of Motion: active/assisted/passive  7. Therapeutic Activities  8. Group Therapy     TREATMENT PLAN: Frequency/Duration: Follow patient BID for duration of hospital stay to address above goals. Rehabilitation Potential For Stated Goals: Good     RECOMMENDED REHABILITATION/EQUIPMENT: (at time of discharge pending progress): Continue Skilled Therapy and Rehab. HISTORY:   History of Present Injury/Illness (Reason for Referral):  R TKA 4/13/18  Past Medical History/Comorbidities:   Ms. Brea Aguilar  has a past medical history of Environmental and seasonal allergies; GERD (gastroesophageal reflux disease); Neuropathy; Osteoporosis; Primary osteoarthritis of right knee; and Status post total right knee replacement (4/13/2018). Ms. Brea Aguilar  has a past surgical history that includes hx tonsillectomy (1962); hx colonoscopy (2012); and hx tubal ligation (1978). Social History/Living Environment:   Home Environment: Private residence  # Steps to Enter: 3  Rails to Enter: No  One/Two Story Residence: One story  Living Alone: No  Support Systems: Spouse/Significant Other/Partner  Patient Expects to be Discharged to[de-identified] Rehabilitation facility  Current DME Used/Available at Home: Crutches, Grab bars, Shower chair  Tub or Shower Type: Shower  Prior Level of Function/Work/Activity:  Independent with mobility.    Number of Personal Factors/Comorbidities that affect the Plan of Care: 0: LOW COMPLEXITY EXAMINATION:   Most Recent Physical Functioning:               RLE AROM  R Knee Flexion: 75  R Knee Extension: 8            Bed Mobility  Supine to Sit: Stand-by assistance  Scooting: Stand-by assistance    Transfers  Sit to Stand: Supervision  Stand to Sit: Supervision    Balance  Sitting: Intact  Standing: Pull to stand; With support              Weight Bearing Status  Right Side Weight Bearing: As tolerated  Distance (ft): 240 Feet (ft) (walked distance twice)  Ambulation - Level of Assistance: Supervision  Assistive Device: Walker, rolling  Gait Abnormalities: Antalgic  Interventions: Safety awareness training     Braces/Orthotics: none           Body Structures Involved:  1. Joints  2. Muscles Body Functions Affected:  1. Movement Related Activities and Participation Affected:  1. Mobility  2. Self Care   Number of elements that affect the Plan of Care: 4+: HIGH COMPLEXITY   CLINICAL PRESENTATION:   Presentation: Stable and uncomplicated: LOW COMPLEXITY   CLINICAL DECISION MAKIN[address] [place] [id number] AM-PAC 6 Clicks   Basic Mobility Inpatient Short Form  How much difficulty does the patient currently have. .. Unable A Lot A Little None   1. Turning over in bed (including adjusting bedclothes, sheets and blankets)? [] 1   [] 2   [x] 3   [] 4   2. Sitting down on and standing up from a chair with arms ( e.g., wheelchair, bedside commode, etc.)   [] 1   [] 2   [x] 3   [] 4   3. Moving from lying on back to sitting on the side of the bed? [] 1   [] 2   [x] 3   [] 4   How much help from another person does the patient currently need. .. Total A Lot A Little None   4. Moving to and from a bed to a chair (including a wheelchair)? [] 1   [] 2   [x] 3   [] 4   5. Need to walk in hospital room? [] 1   [] 2   [x] 3   [] 4   6. Climbing 3-5 steps with a railing? [] 1   [] 2   [x] 3   [] 4   © , Trustees of [address] [place] [id number], under license to [employer].  All rights reserved        Score:  Initial: 18 Most Recent: X (Date: -- )    Interpretation of Tool:  Represents activities that are increasingly more difficult (i.e. Bed mobility, Transfers, Gait). Score 24 23 22-20 19-15 14-10 9-7 6     Modifier CH CI CJ CK CL CM CN      ? Mobility - Walking and Moving Around:     - CURRENT STATUS: CK - 40%-59% impaired, limited or restricted    - GOAL STATUS: CJ - 20%-39% impaired, limited or restricted    - D/C STATUS:  ---------------To be determined---------------  [place] / [place] / Product Type: Medicare /      Medical Necessity:     · Patient is expected to demonstrate progress in strength, range of motion, balance and coordination to increase independence with mobility and ADLs. · Patient demonstrates good rehab potential due to higher previous functional level. Reason for Services/Other Comments:  · Patient continues to require skilled intervention due to decreased R LE strength and ROM and decreased independence with mobility s/p TKA. Use of outcome tool(s) and clinical judgement create a POC that gives a: Clear prediction of patient's progress: LOW COMPLEXITY            TREATMENT:   (In addition to Assessment/Re-Assessment sessions the following treatments were rendered)     Pre-treatment Symptoms/Complaints:  Patient ready for 2nd therapy session. Pain: Initial:   Pain Intensity 1: 0  Post Session:  5/10     Gait Training ( ):  Gait training to improve and/or restore physical functioning as related to mobility, balance and coordination. Ambulated 240 Feet (ft) (walked distance twice) with Supervision using a Walker, rolling and minimal Safety awareness training related to their stance phase and stride length to promote proper body alignment. Therapeutic Exercise: ( ):  Exercises per grid below to improve mobility and strength. Required minimal verbal and tactile cues to promote proper body alignment.   Progressed range, repetitions and complexity of movement as indicated. Date:  4/14/18 Date:  4/15/18 Date:  4/16/18   ACTIVITY/EXERCISE AM PM AM PM AM PM   GROUP THERAPY  []  [x]  [x]  []  [x]  []   Ankle Pumps 10 15 20 20 25    Quad Sets 10 15 20 20 25    Gluteal Sets 10 15 20 20 25    Hip ABd/ADduction 10 15 20 20 25    Straight Leg Raises 10 15 20 20 25    Knee Slides 10 15 20 20 25    Short Arc Quads 10 15 20 20 25    Long Arc Quads         Chair Slides  15 20 20 25             B = bilateral; AA = active assistive; A = active; P = passive      Treatment/Session Assessment:     Response to Treatment:  Patient progressing with ambulation. Education:  [x] Home Exercises  [x] Fall Precautions  [] Hip Precautions [x] D/C Instruction Review  [x] Knee/Hip Prosthesis Review  [x] Walker Management/Safety [] Adaptive Equipment as Needed       Interdisciplinary Collaboration:   o Physical Therapist  o Registered Nurse    After treatment position/precautions:   o Up in chair  o Bed/Chair-wheels locked  o Call light within reach    Compliance with Program/Exercises: compliant all of the time. Recommendations/Intent for next treatment session:  Treatment next visit will focus on increasing Ms. Stephens's independence with bed mobility, transfers, gait training, strength/ROM exercises, modalities for pain, and patient education.       Total Treatment Duration:  PT Patient Time In/Time Out  Time In: 1030  Time Out: 126 Eva Hensley PT

## 2018-04-16 NOTE — DISCHARGE SUMMARY
REHABILITATION DISCHARGE SUMMARY     Patient: Gabriela Tee MRN: [id number]  SSN: [id number]    YOB: 1950  Age: 76 y.o. Sex: female      Date: 4/16/2018  Admit Date: 4/13/2018  Discharge Date: 4/16/2018    Admitting Physician: Ricardo Councilman., MD   Primary Care Physician: Heidi Barreto MD     Admission Condition: good    Chief Complaint : Gait dysfunction secondary to below. Admit Diagnosis: Primary osteoarthritis of right knee [M17.11]  right total knee arthroplasty  4/13/2018  Pain  DVT risk  Post op hemorrhagic anemia  Osteoporosis  Neuropathy  Acute Rehab Dx:  Gait impairment  Mobility and ambulation deficits  Self Care/ADL deficits     HPI: Gabriela Tee is a 76 y.o. female patient at [address] [place] [address] who was admitted on 4/13/2018  by Ricardo Councilman., MD with below mentioned medical history, is being seen for Physical Medicine and Rehabilitation. Gabriela Tee had right knee pain and discomfort with walking and activity due to end stage DJD. The symptoms were not well controlled with conservative management and has limited the patient's function. She underwent a right total knee arthroplasty per Dr. Ricardo Councilman., MD on 4/13/2018. The patient's post operative course has been uncomplicated. Patient's weight bearing status is to be WBAT RLE. Patient is starting to stand, taking steps with acute PT and OT. Patient still shows significant functional deficits due to right knee pain, decreased ROM and strength. Gabriela Tee is seen and examined today. Medical Records reviewed. Pt denies any history of DVT/PE. She denies any other pre morbid functional deficits.   She has been independent with ambulation, prior to admission, limited by right knee pain.       Home Architecture: Home Situation  Home Environment: Private residence (04/14/18 1100)  # Steps to Enter: 3 (04/14/18 1100)  Rails to Enter: No (04/14/18 1100)  One/Two Story Residence: One story (04/14/18 1100)  Living Alone: No (04/14/18 1100)  Support Systems: Spouse/Significant Other/Partner (04/14/18 1100)  Patient Expects to be Discharged to[de-identified] Rehabilitation facility (04/14/18 1100)  Current DME Used/Available at Home: Crutches;Grab bars; Shower chair (04/14/18 1100)  Tub or Shower Type: Shower (04/14/18 1100)     Past Medical History:   Diagnosis Date    Environmental and seasonal allergies     GERD (gastroesophageal reflux disease)     diet controlled     Neuropathy     bilateral lower extremities     Osteoporosis     Primary osteoarthritis of right knee     Status post total right knee replacement 4/13/2018      Past Surgical History:   Procedure Laterality Date    HX COLONOSCOPY  2012    HX TONSILLECTOMY  1962    HX TUBAL LIGATION  1978      Family History   Problem Relation Age of Onset    Alzheimer Mother     Cancer Father       Social History   Substance Use Topics    Smoking status: Never Smoker    Smokeless tobacco: Never Used    Alcohol use No       No Known Allergies    Prior to Admission medications    Medication Sig Start Date End Date Taking? Authorizing Provider   aspirin delayed-release 81 mg tablet Take 1 Tab by mouth every twelve (12) hours for 35 days. 4/13/18 5/18/18 Yes ADRIANA Chatman   HYDROmorphone (DILAUDID) 2 mg tablet Take 1 Tab by mouth every four (4) hours as needed for Pain. Max Daily Amount: 12 mg. 4/13/18  Yes ADRIANA Chatman   ergocalciferol (ERGOCALCIFEROL) 50,000 unit capsule Take 50,000 Units by mouth. Twice a week   Yes Historical Provider   calcium-vitamin D (OYSTER SHELL) 500 mg(1,250mg) -200 unit per tablet Take 1 Tab by mouth daily. Yes Historical Provider   aspirin delayed-release 81 mg tablet Take 81 mg by mouth daily. Yes Historical Provider   traMADol (ULTRAM) 50 mg tablet Take 50 mg by mouth every six (6) hours as needed. 2/19/18   Historical Provider   magnesium oxide (MAG-OX) 400 mg tablet Take 400 mg by mouth daily.     Historical Provider       Current Medications:  Current Facility-Administered Medications   Medication Dose Route Frequency Provider Last Rate Last Dose    alum-mag hydroxide-simeth (MYLANTA) oral suspension 30 mL  30 mL Oral Q4H PRN Mago Kenney MD   30 mL at 04/15/18 2014    magnesium oxide (MAG-OX) tablet 400 mg  400 mg Oral DAILY Prachi Quant, PA   400 mg at 04/16/18 0840    sodium chloride (NS) flush 5-10 mL  5-10 mL IntraVENous Q8H Prachi Quant, PA   10 mL at 04/15/18 1400    sodium chloride (NS) flush 5-10 mL  5-10 mL IntraVENous PRN Prachi Quant, PA   5 mL at 04/16/18 0512    acetaminophen (TYLENOL) tablet 1,000 mg  1,000 mg Oral Q6H Prachi Quant, PA   1,000 mg at 04/16/18 0511    celecoxib (CELEBREX) capsule 200 mg  200 mg Oral Q12H Prachi Quant, PA   200 mg at 04/16/18 0840    HYDROmorphone (DILAUDID) tablet 2 mg  2 mg Oral Q4H PRN Prachi Quant, PA   2 mg at 04/16/18 0955    HYDROmorphone (PF) (DILAUDID) injection 1 mg  1 mg IntraVENous Q3H PRN Prachi Quant, PA        naloxone [facility]) injection 0.2-0.4 mg  0.2-0.4 mg IntraVENous Q10MIN PRN Prachi Quant, PA        ondansetron [facility]) injection 4 mg  4 mg IntraVENous Q4H PRN Prachi Quant, PA   4 mg at 04/14/18 0810    diphenhydrAMINE (BENADRYL) capsule 25 mg  25 mg Oral Q4H PRN Prachi Quant, PA        senna-docusate (PERICOLACE) 8.6-50 mg per tablet 2 Tab  2 Tab Oral DAILY Prachi Quant, PA   2 Tab at 04/16/18 0840    aspirin delayed-release tablet 81 mg  81 mg Oral Q12H Prachi Quant, PA   81 mg at 04/16/18 0840    cetirizine (ZYRTEC) tablet 5 mg (Patient Supplied)  5 mg Oral DAILY Dm Espinal MD   5 mg at 04/15/18 1900        Review of Systems: Denies chest pain, shortness of breath, cough, headache, visual problems, abdominal pain, dysurea, calf pain. Pertinent positives are as noted in the medical records and unremarkable otherwise.     Vital Signs:   Patient Vitals for the past 8 hrs:   BP Temp Pulse Resp SpO2 04/16/18 0709 134/81 97.7 °F (36.5 °C) 72 18 96 %   04/16/18 0509 122/71 97.2 °F (36.2 °C) 76 18 97 %        Physical Exam:   General: Alert and age appropriately oriented. No acute cardio respiratory distress. HEENT: Normocephalic. Oral mucosa moist without cyanosis. Lungs: Clear to auscultation  bilaterally. Respiration even and unlabored   Heart: Regular rate and rhythm, S1, S2   No  murmurs, clicks, rub or gallops   Abdomen: Soft, non-tender, nondistended. Bowel sounds present   Genitourinary: defered   Neuromuscular:      Grossly no focal neurological deficits noted. L Ankle dorsiflexion 5/5   L Ankle plantarflexion 5/5  R Ankle dorsiflexion 5/5   R Ankle plantarflexion 5/5  No sensory deficits. Skin/extremity: No rashes, no erythema. Calves soft. Wound covered.      Skin Incision(s)/Wound(s):        Lab Review:   Recent Results (from the past 72 hour(s))   HEMOGLOBIN    Collection Time: 04/13/18  7:46 PM   Result Value Ref Range    HGB 11.5 (L) 11.7 - 15.4 g/dL   HEMOGLOBIN    Collection Time: 04/14/18  4:31 AM   Result Value Ref Range    HGB 10.9 (L) 11.7 - 57.3 g/dL   METABOLIC PANEL, BASIC    Collection Time: 04/14/18  4:31 AM   Result Value Ref Range    Sodium 140 136 - 145 mmol/L    Potassium 4.4 3.5 - 5.1 mmol/L    Chloride 106 98 - 107 mmol/L    CO2 29 21 - 32 mmol/L    Anion gap 5 (L) 7 - 16 mmol/L    Glucose 120 (H) 65 - 100 mg/dL    BUN 12 8 - 23 MG/DL    Creatinine 0.68 0.6 - 1.0 MG/DL    GFR est AA >60 >60 ml/min/1.73m2    GFR est non-AA >60 >60 ml/min/1.73m2    Calcium 8.7 8.3 - 10.4 MG/DL   PLEASE READ & DOCUMENT PPD TEST IN 24 HRS    Collection Time: 04/14/18 10:00 AM   Result Value Ref Range    PPD Negative Negative    mm Induration 0 mm   HEMOGLOBIN    Collection Time: 04/15/18  4:20 AM   Result Value Ref Range    HGB 9.3 (L) 11.7 - 15.4 g/dL   PLEASE READ & DOCUMENT PPD TEST IN 48 HRS    Collection Time: 04/15/18  8:40 AM   Result Value Ref Range    PPD  Negative    mm Induration 0 mm   HEMOGLOBIN    Collection Time: 04/16/18  5:09 AM   Result Value Ref Range    HGB 9.9 (L) 11.7 - 15.4 g/dL   PLEASE READ & DOCUMENT PPD TEST IN 72 HRS    Collection Time: 04/16/18  9:00 AM   Result Value Ref Range    PPD  Negative    mm Induration 0 mm       PT Initial  PT Most Recent   AROM: Within functional limits (L LE) (04/14/18 1100) Within functional limits (L LE) (04/15/18 1200)         Strength: Within functional limits (L LE) (04/14/18 1100) Within functional limits (L LE) (04/15/18 1200)   Coordination: Generally decreased, functional (04/14/18 1100) Within functional limits (04/15/18 1200)   Tone: Normal (04/14/18 1100) Normal (04/15/18 1200)   Sensation: Intact (04/14/18 1100) Intact (04/15/18 1200)         Distance (ft): 30 Feet (ft) (04/14/18 1100) 350 Feet (ft) (04/15/18 1300)   Assistive Device: Walker, rolling (04/14/18 1100) Walker, rolling (04/15/18 1300)       OT Initial OT Most Recent                                               ST Initial ST Most Recent                        Principal Problem:    Status post total right knee replacement (4/13/2018)          Condition on discharge from [facility] to SNF:  good  Rehabilitation  potential : Good   Goals in Rehab : Patient to reach maximal rehabilitation potential in functional mobility,ambulation and ADL/ self care skills ; to improve strength and endurance  Plan / Disposition :STR-Rehab  as discussed with patient/ family and the Case management/ Social service team  Expected Length Of Stay : Depending on pace of progress in mobility and self care in PT and OT ,therapies    Discharge Instructions:  Rehabilitation Management/ Medical Management: 1. Devices:Walkers, Type: Rolling Walker  2. Consult:Rehab team including PT, OT,  and . 3. Disposition Rehab-discussed with patient. 4. Thigh-high or knee-high LIZA's when out of bed. 5. DVT Prophylaxis - aspirin 81mg bid x 30days.  Please notify surgeon at [facility] if DVT diagnosed. 6. Incentive spirometer Q1H while awake  7. Post op hemorrhagic anemia- monitor. 8. Activity: WBAT  RLE  9. Planned Labs: CBC,BMP  10. Pain Control:   Continue scheduled tylenol, celebrex and  PRN meds. 11. Wound Care: May remove Aquacel 1 week post op and replace with Tegaderm and sterile gauze dressing. Keep wound clean and dry and reinforce dressing PRN. Remove staples 12-14 post surgery, when incision appears appropriately closed and apply benzoin and 1/2\" steristrips. 12. In case of complications: Please notify surgeon at [address] if suspect infection, cellulitis, wound dehiscence or instrument failure, and if considering starting antibiotic. Follow up with ORTHO per instructions. [address]        Transfer Medications:    acetaminophen (TYLENOL) tablet 1,000 mg Ordered Dose: 1,000 mg Route: Oral Frequency: EVERY 8 HOURS x 1 week then change to prn.    celecoxib (CELEBREX) capsule 200 mg Ordered Dose: 200 mg Route: Oral Frequency: EVERY 12 HOURS       senna-docusate (PERICOLACE) 8.6-50 mg per tablet 2 Tab Ordered Dose: 2 Tab Route: Oral Frequency: DAILY       Current Discharge Medication List      START taking these medications    Details   HYDROmorphone (DILAUDID) 2 mg tablet Take 1 Tab by mouth every four (4) hours as needed for Pain. Max Daily Amount: 12 mg. Associated Diagnoses: Status post total right knee replacement         CONTINUE these medications which have CHANGED    Details   aspirin delayed-release 81 mg tablet Take 1 Tab by mouth every twelve (12) hours for 35 days. Associated Diagnoses: Status post total right knee replacement         CONTINUE these medications which have NOT CHANGED    Details   ergocalciferol (ERGOCALCIFEROL) 50,000 unit capsule Take 50,000 Units by mouth. Twice a week      calcium-vitamin D (OYSTER SHELL) 500 mg(1,250mg) -200 unit per tablet Take 1 Tab by mouth daily.       magnesium oxide (MAG-OX) 400 mg tablet Take 400 mg by mouth daily. STOP taking these medications       traMADol (ULTRAM) 50 mg tablet Comments:   Reason for Stopping:             O.K. Admit to sub acute rehab today. Discharge time: 35 minutes.     Signed By: Curt Olsen MD     April 16, 2018

## 2018-04-16 NOTE — PROGRESS NOTES
Rec'd message from Leandra Morgan that [facility] has accepted patient for admission today 4-16. Patient transferred to [facility] by car. RN given # to call report. Orders faxed prior to transfer.   Caleb Heard

## 2018-04-16 NOTE — PROGRESS NOTES
Patient assisted to bathroom to void and back to bed. Medicated with dilaudid 2 mg PO for c/o pain, see MAR.

## 2018-04-24 ENCOUNTER — HOME HEALTH ADMISSION (OUTPATIENT)
Dept: HOME HEALTH SERVICES | Facility: HOME HEALTH | Age: 68
End: 2018-04-24
Payer: MEDICARE

## 2018-04-25 ENCOUNTER — HOME CARE VISIT (OUTPATIENT)
Dept: SCHEDULING | Facility: HOME HEALTH | Age: 68
End: 2018-04-25
Payer: MEDICARE

## 2018-04-25 VITALS
RESPIRATION RATE: 18 BRPM | DIASTOLIC BLOOD PRESSURE: 70 MMHG | SYSTOLIC BLOOD PRESSURE: 130 MMHG | TEMPERATURE: 98.7 F | HEART RATE: 84 BPM

## 2018-04-25 PROCEDURE — G0151 HHCP-SERV OF PT,EA 15 MIN: HCPCS

## 2018-04-25 PROCEDURE — 3331090001 HH PPS REVENUE CREDIT

## 2018-04-25 PROCEDURE — 3331090002 HH PPS REVENUE DEBIT

## 2018-04-25 PROCEDURE — 400013 HH SOC

## 2018-04-26 PROCEDURE — 3331090002 HH PPS REVENUE DEBIT

## 2018-04-26 PROCEDURE — 3331090001 HH PPS REVENUE CREDIT

## 2018-04-27 ENCOUNTER — HOME CARE VISIT (OUTPATIENT)
Dept: SCHEDULING | Facility: HOME HEALTH | Age: 68
End: 2018-04-27
Payer: MEDICARE

## 2018-04-27 VITALS
SYSTOLIC BLOOD PRESSURE: 135 MMHG | TEMPERATURE: 98.3 F | HEART RATE: 84 BPM | RESPIRATION RATE: 18 BRPM | DIASTOLIC BLOOD PRESSURE: 80 MMHG

## 2018-04-27 PROCEDURE — G0151 HHCP-SERV OF PT,EA 15 MIN: HCPCS

## 2018-04-27 PROCEDURE — 3331090001 HH PPS REVENUE CREDIT

## 2018-04-27 PROCEDURE — 3331090002 HH PPS REVENUE DEBIT

## 2018-04-28 PROCEDURE — 3331090002 HH PPS REVENUE DEBIT

## 2018-04-28 PROCEDURE — 3331090001 HH PPS REVENUE CREDIT

## 2018-04-29 PROCEDURE — 3331090001 HH PPS REVENUE CREDIT

## 2018-04-29 PROCEDURE — 3331090002 HH PPS REVENUE DEBIT

## 2018-04-30 ENCOUNTER — HOME CARE VISIT (OUTPATIENT)
Dept: SCHEDULING | Facility: HOME HEALTH | Age: 68
End: 2018-04-30
Payer: MEDICARE

## 2018-04-30 VITALS
RESPIRATION RATE: 18 BRPM | DIASTOLIC BLOOD PRESSURE: 72 MMHG | HEART RATE: 68 BPM | TEMPERATURE: 98 F | SYSTOLIC BLOOD PRESSURE: 140 MMHG

## 2018-04-30 PROCEDURE — 3331090001 HH PPS REVENUE CREDIT

## 2018-04-30 PROCEDURE — G0157 HHC PT ASSISTANT EA 15: HCPCS

## 2018-04-30 PROCEDURE — 3331090002 HH PPS REVENUE DEBIT

## 2018-05-01 PROCEDURE — 3331090002 HH PPS REVENUE DEBIT

## 2018-05-01 PROCEDURE — 3331090001 HH PPS REVENUE CREDIT

## 2018-05-02 ENCOUNTER — HOME CARE VISIT (OUTPATIENT)
Dept: SCHEDULING | Facility: HOME HEALTH | Age: 68
End: 2018-05-02
Payer: MEDICARE

## 2018-05-02 ENCOUNTER — HOME CARE VISIT (OUTPATIENT)
Dept: HOME HEALTH SERVICES | Facility: HOME HEALTH | Age: 68
End: 2018-05-02

## 2018-05-02 VITALS
DIASTOLIC BLOOD PRESSURE: 62 MMHG | TEMPERATURE: 96.8 F | SYSTOLIC BLOOD PRESSURE: 122 MMHG | RESPIRATION RATE: 18 BRPM | HEART RATE: 78 BPM

## 2018-05-02 PROCEDURE — 3331090002 HH PPS REVENUE DEBIT

## 2018-05-02 PROCEDURE — G0157 HHC PT ASSISTANT EA 15: HCPCS

## 2018-05-02 PROCEDURE — 3331090001 HH PPS REVENUE CREDIT

## 2018-05-03 PROCEDURE — 3331090001 HH PPS REVENUE CREDIT

## 2018-05-03 PROCEDURE — 3331090002 HH PPS REVENUE DEBIT

## 2018-05-04 ENCOUNTER — HOME CARE VISIT (OUTPATIENT)
Dept: SCHEDULING | Facility: HOME HEALTH | Age: 68
End: 2018-05-04
Payer: MEDICARE

## 2018-05-04 PROCEDURE — 3331090002 HH PPS REVENUE DEBIT

## 2018-05-04 PROCEDURE — G0157 HHC PT ASSISTANT EA 15: HCPCS

## 2018-05-04 PROCEDURE — 3331090001 HH PPS REVENUE CREDIT

## 2018-05-05 PROCEDURE — 3331090001 HH PPS REVENUE CREDIT

## 2018-05-05 PROCEDURE — 3331090002 HH PPS REVENUE DEBIT

## 2018-05-06 VITALS
DIASTOLIC BLOOD PRESSURE: 60 MMHG | RESPIRATION RATE: 18 BRPM | HEART RATE: 84 BPM | SYSTOLIC BLOOD PRESSURE: 120 MMHG | TEMPERATURE: 97.7 F

## 2018-05-06 PROCEDURE — 3331090001 HH PPS REVENUE CREDIT

## 2018-05-06 PROCEDURE — 3331090002 HH PPS REVENUE DEBIT

## 2018-05-07 PROCEDURE — 3331090002 HH PPS REVENUE DEBIT

## 2018-05-07 PROCEDURE — 3331090001 HH PPS REVENUE CREDIT

## 2018-05-08 ENCOUNTER — HOME CARE VISIT (OUTPATIENT)
Dept: SCHEDULING | Facility: HOME HEALTH | Age: 68
End: 2018-05-08
Payer: MEDICARE

## 2018-05-08 VITALS
DIASTOLIC BLOOD PRESSURE: 72 MMHG | HEART RATE: 82 BPM | TEMPERATURE: 98.5 F | SYSTOLIC BLOOD PRESSURE: 138 MMHG | RESPIRATION RATE: 18 BRPM

## 2018-05-08 PROCEDURE — G0157 HHC PT ASSISTANT EA 15: HCPCS

## 2018-05-08 PROCEDURE — 3331090001 HH PPS REVENUE CREDIT

## 2018-05-08 PROCEDURE — 3331090002 HH PPS REVENUE DEBIT

## 2018-05-09 PROCEDURE — 3331090002 HH PPS REVENUE DEBIT

## 2018-05-09 PROCEDURE — 3331090001 HH PPS REVENUE CREDIT

## 2018-05-10 ENCOUNTER — HOME CARE VISIT (OUTPATIENT)
Dept: SCHEDULING | Facility: HOME HEALTH | Age: 68
End: 2018-05-10
Payer: MEDICARE

## 2018-05-10 PROCEDURE — G0157 HHC PT ASSISTANT EA 15: HCPCS

## 2018-05-10 PROCEDURE — 3331090001 HH PPS REVENUE CREDIT

## 2018-05-10 PROCEDURE — 3331090002 HH PPS REVENUE DEBIT

## 2018-05-11 VITALS
DIASTOLIC BLOOD PRESSURE: 62 MMHG | RESPIRATION RATE: 18 BRPM | HEART RATE: 78 BPM | TEMPERATURE: 98.3 F | SYSTOLIC BLOOD PRESSURE: 130 MMHG

## 2018-05-11 PROCEDURE — 3331090002 HH PPS REVENUE DEBIT

## 2018-05-11 PROCEDURE — 3331090001 HH PPS REVENUE CREDIT

## 2018-05-12 PROCEDURE — 3331090002 HH PPS REVENUE DEBIT

## 2018-05-12 PROCEDURE — 3331090001 HH PPS REVENUE CREDIT

## 2018-05-13 PROCEDURE — 3331090002 HH PPS REVENUE DEBIT

## 2018-05-13 PROCEDURE — 3331090001 HH PPS REVENUE CREDIT

## 2018-05-14 ENCOUNTER — HOME CARE VISIT (OUTPATIENT)
Dept: SCHEDULING | Facility: HOME HEALTH | Age: 68
End: 2018-05-14
Payer: MEDICARE

## 2018-05-14 VITALS
SYSTOLIC BLOOD PRESSURE: 128 MMHG | HEART RATE: 80 BPM | RESPIRATION RATE: 18 BRPM | TEMPERATURE: 98.6 F | DIASTOLIC BLOOD PRESSURE: 78 MMHG

## 2018-05-14 PROCEDURE — 3331090002 HH PPS REVENUE DEBIT

## 2018-05-14 PROCEDURE — 3331090001 HH PPS REVENUE CREDIT

## 2018-05-14 PROCEDURE — G0157 HHC PT ASSISTANT EA 15: HCPCS

## 2018-05-15 PROCEDURE — 3331090001 HH PPS REVENUE CREDIT

## 2018-05-15 PROCEDURE — 3331090002 HH PPS REVENUE DEBIT

## 2018-05-16 PROCEDURE — 3331090001 HH PPS REVENUE CREDIT

## 2018-05-16 PROCEDURE — 3331090002 HH PPS REVENUE DEBIT

## 2018-05-17 ENCOUNTER — HOME CARE VISIT (OUTPATIENT)
Dept: SCHEDULING | Facility: HOME HEALTH | Age: 68
End: 2018-05-17
Payer: MEDICARE

## 2018-05-17 PROCEDURE — 3331090002 HH PPS REVENUE DEBIT

## 2018-05-17 PROCEDURE — 3331090001 HH PPS REVENUE CREDIT

## 2018-05-17 PROCEDURE — G0151 HHCP-SERV OF PT,EA 15 MIN: HCPCS

## 2018-05-18 VITALS — SYSTOLIC BLOOD PRESSURE: 122 MMHG | HEART RATE: 81 BPM | RESPIRATION RATE: 20 BRPM | DIASTOLIC BLOOD PRESSURE: 74 MMHG

## 2018-06-22 ENCOUNTER — HOSPITAL ENCOUNTER (OUTPATIENT)
Dept: SURGERY | Age: 68
Discharge: HOME | End: 2018-06-22

## 2018-06-22 ENCOUNTER — ANESTHESIA EVENT (OUTPATIENT)
Dept: SURGERY | Age: 68
End: 2018-06-22
Payer: MEDICARE

## 2018-06-22 VITALS — BODY MASS INDEX: 23.56 KG/M2 | WEIGHT: 138 LBS | HEIGHT: 64 IN

## 2018-06-22 RX ORDER — ASPIRIN 81 MG/1
81 TABLET ORAL DAILY
COMMUNITY

## 2018-06-22 RX ORDER — MENTHOL
1000 GEL (GRAM) TOPICAL DAILY
COMMUNITY
End: 2018-06-26

## 2018-06-22 RX ORDER — SODIUM CHLORIDE 0.9 % (FLUSH) 0.9 %
5-10 SYRINGE (ML) INJECTION AS NEEDED
Status: CANCELLED | OUTPATIENT
Start: 2018-06-22

## 2018-06-22 RX ORDER — TRAMADOL HYDROCHLORIDE 50 MG/1
50 TABLET ORAL
COMMUNITY
End: 2018-06-26

## 2018-06-22 RX ORDER — SODIUM CHLORIDE 0.9 % (FLUSH) 0.9 %
5-10 SYRINGE (ML) INJECTION EVERY 8 HOURS
Status: CANCELLED | OUTPATIENT
Start: 2018-06-22

## 2018-06-22 NOTE — PERIOP NOTES
Patient verified name, , and surgery as listed in [facility]. Type 1B surgery, PAT phone assessment complete. Orders located in [facility]. Labs per surgeon: None. Labs per anesthesia protocol: None. Patient answered medical/surgical history questions at their best of ability. All prior to admission medications documented in [facility]. Patient instructed to take the following medications the day of surgery according to anesthesia guidelines with a small sip of water: Tramadol if needed. Hold all vitamins 7 days prior to surgery and NSAIDS 5 days prior to surgery. Medications to be held: all vitamins/supplements/herbals. Patient instructed on the following:  Arrive at [address], time of arrival to be called the day before by 1700  NPO after midnight including gum, mints, and ice chips  Responsible adult must drive patient to the hospital, stay during surgery, and patient will  need supervision 24 hours after anesthesia  Use antibacterial soap in shower the night before surgery and on the morning of surgery  Leave all valuables (money and jewelry) at home but bring insurance card and ID on       DOS  Do not wear make-up, nail polish, lotions, cologne, perfumes, powders, or oil on skin. Patient teach back successful and patient demonstrates knowledge of instruction.

## 2018-06-25 ENCOUNTER — HOSPITAL ENCOUNTER (OUTPATIENT)
Age: 68
Setting detail: OBSERVATION
Discharge: HOME HEALTH | End: 2018-06-26
Attending: ORTHOPAEDIC SURGERY | Admitting: ORTHOPAEDIC SURGERY
Payer: MEDICARE

## 2018-06-25 ENCOUNTER — ANESTHESIA (OUTPATIENT)
Dept: SURGERY | Age: 68
End: 2018-06-25
Payer: MEDICARE

## 2018-06-25 ENCOUNTER — APPOINTMENT (OUTPATIENT)
Dept: GENERAL RADIOLOGY | Age: 68
End: 2018-06-25
Attending: ORTHOPAEDIC SURGERY
Payer: MEDICARE

## 2018-06-25 DIAGNOSIS — Z96.659 POSTOPERATIVE STIFFNESS OF TOTAL KNEE REPLACEMENT, INITIAL ENCOUNTER (HCC): Primary | ICD-10-CM

## 2018-06-25 DIAGNOSIS — M25.669 POSTOPERATIVE STIFFNESS OF TOTAL KNEE REPLACEMENT, INITIAL ENCOUNTER (HCC): Primary | ICD-10-CM

## 2018-06-25 DIAGNOSIS — T84.89XA POSTOPERATIVE STIFFNESS OF TOTAL KNEE REPLACEMENT, INITIAL ENCOUNTER (HCC): Primary | ICD-10-CM

## 2018-06-25 PROBLEM — Z98.890 STATUS POST SURGICAL MANIPULATION OF KNEE JOINT: Status: ACTIVE | Noted: 2018-06-25

## 2018-06-25 PROCEDURE — 74011000250 HC RX REV CODE- 250: Performed by: ORTHOPAEDIC SURGERY

## 2018-06-25 PROCEDURE — G8979 MOBILITY GOAL STATUS: HCPCS

## 2018-06-25 PROCEDURE — G8978 MOBILITY CURRENT STATUS: HCPCS

## 2018-06-25 PROCEDURE — 74011250636 HC RX REV CODE- 250/636: Performed by: ORTHOPAEDIC SURGERY

## 2018-06-25 PROCEDURE — 74011250636 HC RX REV CODE- 250/636

## 2018-06-25 PROCEDURE — 76210000016 HC OR PH I REC 1 TO 1.5 HR: Performed by: ORTHOPAEDIC SURGERY

## 2018-06-25 PROCEDURE — 94762 N-INVAS EAR/PLS OXIMTRY CONT: CPT

## 2018-06-25 PROCEDURE — 97110 THERAPEUTIC EXERCISES: CPT

## 2018-06-25 PROCEDURE — 99218 HC RM OBSERVATION: CPT

## 2018-06-25 PROCEDURE — 76010000230: Performed by: ORTHOPAEDIC SURGERY

## 2018-06-25 PROCEDURE — 74011000250 HC RX REV CODE- 250: Performed by: ANESTHESIOLOGY

## 2018-06-25 PROCEDURE — 76060000031 HC ANESTHESIA FIRST 0.5 HR: Performed by: ORTHOPAEDIC SURGERY

## 2018-06-25 PROCEDURE — 77030003602 HC NDL NRV BLK BBMI -B: Performed by: ANESTHESIOLOGY

## 2018-06-25 PROCEDURE — 97161 PT EVAL LOW COMPLEX 20 MIN: CPT

## 2018-06-25 PROCEDURE — 74011250637 HC RX REV CODE- 250/637: Performed by: ORTHOPAEDIC SURGERY

## 2018-06-25 PROCEDURE — 65390000012 HC CONDITION CODE 44 OBSERVATION

## 2018-06-25 PROCEDURE — 94760 N-INVAS EAR/PLS OXIMETRY 1: CPT

## 2018-06-25 PROCEDURE — 73560 X-RAY EXAM OF KNEE 1 OR 2: CPT

## 2018-06-25 PROCEDURE — 74011250636 HC RX REV CODE- 250/636: Performed by: ANESTHESIOLOGY

## 2018-06-25 RX ORDER — ONDANSETRON 2 MG/ML
4 INJECTION INTRAMUSCULAR; INTRAVENOUS ONCE
Status: DISCONTINUED | OUTPATIENT
Start: 2018-06-25 | End: 2018-06-25

## 2018-06-25 RX ORDER — SODIUM CHLORIDE 0.9 % (FLUSH) 0.9 %
5-10 SYRINGE (ML) INJECTION AS NEEDED
Status: DISCONTINUED | OUTPATIENT
Start: 2018-06-25 | End: 2018-06-25

## 2018-06-25 RX ORDER — HYDROMORPHONE HYDROCHLORIDE 2 MG/ML
0.5 INJECTION, SOLUTION INTRAMUSCULAR; INTRAVENOUS; SUBCUTANEOUS
Status: DISCONTINUED | OUTPATIENT
Start: 2018-06-25 | End: 2018-06-25

## 2018-06-25 RX ORDER — ACETAMINOPHEN 500 MG
500 TABLET ORAL ONCE
Status: DISCONTINUED | OUTPATIENT
Start: 2018-06-25 | End: 2018-06-25

## 2018-06-25 RX ORDER — ONDANSETRON 4 MG/1
4 TABLET, ORALLY DISINTEGRATING ORAL
Status: DISCONTINUED | OUTPATIENT
Start: 2018-06-25 | End: 2018-06-26 | Stop reason: HOSPADM

## 2018-06-25 RX ORDER — ONDANSETRON 2 MG/ML
4 INJECTION INTRAMUSCULAR; INTRAVENOUS
Status: DISCONTINUED | OUTPATIENT
Start: 2018-06-25 | End: 2018-06-26 | Stop reason: HOSPADM

## 2018-06-25 RX ORDER — TRAMADOL HYDROCHLORIDE 50 MG/1
50 TABLET ORAL
Status: DISCONTINUED | OUTPATIENT
Start: 2018-06-25 | End: 2018-06-26 | Stop reason: HOSPADM

## 2018-06-25 RX ORDER — MIDAZOLAM HYDROCHLORIDE 1 MG/ML
2 INJECTION, SOLUTION INTRAMUSCULAR; INTRAVENOUS
Status: COMPLETED | OUTPATIENT
Start: 2018-06-25 | End: 2018-06-25

## 2018-06-25 RX ORDER — OXYCODONE HYDROCHLORIDE 5 MG/1
5 TABLET ORAL
Status: DISCONTINUED | OUTPATIENT
Start: 2018-06-25 | End: 2018-06-25

## 2018-06-25 RX ORDER — SODIUM CHLORIDE 0.9 % (FLUSH) 0.9 %
5-10 SYRINGE (ML) INJECTION EVERY 8 HOURS
Status: CANCELLED | OUTPATIENT
Start: 2018-06-25

## 2018-06-25 RX ORDER — DIPHENHYDRAMINE HYDROCHLORIDE 50 MG/ML
12.5 INJECTION, SOLUTION INTRAMUSCULAR; INTRAVENOUS ONCE
Status: DISCONTINUED | OUTPATIENT
Start: 2018-06-25 | End: 2018-06-25

## 2018-06-25 RX ORDER — ASPIRIN 81 MG/1
81 TABLET ORAL DAILY
Status: DISCONTINUED | OUTPATIENT
Start: 2018-06-25 | End: 2018-06-26 | Stop reason: HOSPADM

## 2018-06-25 RX ORDER — HYDROMORPHONE HYDROCHLORIDE 2 MG/1
2 TABLET ORAL
Qty: 40 TAB | Refills: 0 | Status: SHIPPED | OUTPATIENT
Start: 2018-06-25 | End: 2018-07-24

## 2018-06-25 RX ORDER — DIPHENHYDRAMINE HCL 25 MG
25 CAPSULE ORAL
Status: DISCONTINUED | OUTPATIENT
Start: 2018-06-25 | End: 2018-06-26 | Stop reason: HOSPADM

## 2018-06-25 RX ORDER — NALOXONE HYDROCHLORIDE 0.4 MG/ML
0.1 INJECTION, SOLUTION INTRAMUSCULAR; INTRAVENOUS; SUBCUTANEOUS AS NEEDED
Status: DISCONTINUED | OUTPATIENT
Start: 2018-06-25 | End: 2018-06-25

## 2018-06-25 RX ORDER — NALOXONE HYDROCHLORIDE 0.4 MG/ML
.2-.4 INJECTION, SOLUTION INTRAMUSCULAR; INTRAVENOUS; SUBCUTANEOUS
Status: DISCONTINUED | OUTPATIENT
Start: 2018-06-25 | End: 2018-06-26 | Stop reason: HOSPADM

## 2018-06-25 RX ORDER — SODIUM CHLORIDE, SODIUM LACTATE, POTASSIUM CHLORIDE, CALCIUM CHLORIDE 600; 310; 30; 20 MG/100ML; MG/100ML; MG/100ML; MG/100ML
75 INJECTION, SOLUTION INTRAVENOUS CONTINUOUS
Status: DISCONTINUED | OUTPATIENT
Start: 2018-06-25 | End: 2018-06-25

## 2018-06-25 RX ORDER — CELECOXIB 200 MG/1
200 CAPSULE ORAL EVERY 12 HOURS
Status: DISCONTINUED | OUTPATIENT
Start: 2018-06-25 | End: 2018-06-26 | Stop reason: HOSPADM

## 2018-06-25 RX ORDER — ASPIRIN 81 MG/1
81 TABLET ORAL EVERY 12 HOURS
Status: CANCELLED | OUTPATIENT
Start: 2018-06-25

## 2018-06-25 RX ORDER — SODIUM CHLORIDE 0.9 % (FLUSH) 0.9 %
5-10 SYRINGE (ML) INJECTION AS NEEDED
Status: DISCONTINUED | OUTPATIENT
Start: 2018-06-25 | End: 2018-06-26 | Stop reason: HOSPADM

## 2018-06-25 RX ORDER — FENTANYL CITRATE 50 UG/ML
100 INJECTION, SOLUTION INTRAMUSCULAR; INTRAVENOUS AS NEEDED
Status: DISCONTINUED | OUTPATIENT
Start: 2018-06-25 | End: 2018-06-25 | Stop reason: HOSPADM

## 2018-06-25 RX ORDER — SODIUM CHLORIDE 9 MG/ML
100 INJECTION, SOLUTION INTRAVENOUS CONTINUOUS
Status: CANCELLED | OUTPATIENT
Start: 2018-06-25 | End: 2018-06-26

## 2018-06-25 RX ORDER — PROPOFOL 10 MG/ML
INJECTION, EMULSION INTRAVENOUS AS NEEDED
Status: DISCONTINUED | OUTPATIENT
Start: 2018-06-25 | End: 2018-06-25 | Stop reason: HOSPADM

## 2018-06-25 RX ORDER — DEXAMETHASONE SODIUM PHOSPHATE 100 MG/10ML
10 INJECTION INTRAMUSCULAR; INTRAVENOUS ONCE
Status: CANCELLED | OUTPATIENT
Start: 2018-06-26 | End: 2018-06-26

## 2018-06-25 RX ORDER — ACETAMINOPHEN 500 MG
1000 TABLET ORAL EVERY 6 HOURS
Status: DISCONTINUED | OUTPATIENT
Start: 2018-06-26 | End: 2018-06-26 | Stop reason: HOSPADM

## 2018-06-25 RX ORDER — HYDROMORPHONE HYDROCHLORIDE 1 MG/ML
1 INJECTION, SOLUTION INTRAMUSCULAR; INTRAVENOUS; SUBCUTANEOUS
Status: DISCONTINUED | OUTPATIENT
Start: 2018-06-25 | End: 2018-06-26 | Stop reason: HOSPADM

## 2018-06-25 RX ORDER — ROPIVACAINE HYDROCHLORIDE 2 MG/ML
INJECTION, SOLUTION EPIDURAL; INFILTRATION; PERINEURAL AS NEEDED
Status: DISCONTINUED | OUTPATIENT
Start: 2018-06-25 | End: 2018-06-25 | Stop reason: HOSPADM

## 2018-06-25 RX ORDER — OXYCODONE HYDROCHLORIDE 5 MG/1
10 TABLET ORAL
Status: DISCONTINUED | OUTPATIENT
Start: 2018-06-25 | End: 2018-06-25

## 2018-06-25 RX ORDER — SODIUM CHLORIDE, SODIUM LACTATE, POTASSIUM CHLORIDE, CALCIUM CHLORIDE 600; 310; 30; 20 MG/100ML; MG/100ML; MG/100ML; MG/100ML
1000 INJECTION, SOLUTION INTRAVENOUS CONTINUOUS
Status: DISCONTINUED | OUTPATIENT
Start: 2018-06-25 | End: 2018-06-25 | Stop reason: HOSPADM

## 2018-06-25 RX ORDER — LIDOCAINE HYDROCHLORIDE 10 MG/ML
0.1 INJECTION INFILTRATION; PERINEURAL AS NEEDED
Status: DISCONTINUED | OUTPATIENT
Start: 2018-06-25 | End: 2018-06-25 | Stop reason: HOSPADM

## 2018-06-25 RX ORDER — HYDROMORPHONE HYDROCHLORIDE 2 MG/1
2 TABLET ORAL
Status: DISCONTINUED | OUTPATIENT
Start: 2018-06-25 | End: 2018-06-26 | Stop reason: HOSPADM

## 2018-06-25 RX ADMIN — ONDANSETRON 4 MG: 2 INJECTION, SOLUTION INTRAMUSCULAR; INTRAVENOUS at 09:18

## 2018-06-25 RX ADMIN — SODIUM CHLORIDE, SODIUM LACTATE, POTASSIUM CHLORIDE, AND CALCIUM CHLORIDE 1000 ML: 600; 310; 30; 20 INJECTION, SOLUTION INTRAVENOUS at 06:17

## 2018-06-25 RX ADMIN — HYDROMORPHONE HYDROCHLORIDE 0.5 MG: 2 INJECTION, SOLUTION INTRAMUSCULAR; INTRAVENOUS; SUBCUTANEOUS at 07:25

## 2018-06-25 RX ADMIN — PROMETHAZINE HYDROCHLORIDE 6.25 MG: 25 INJECTION INTRAMUSCULAR; INTRAVENOUS at 12:44

## 2018-06-25 RX ADMIN — CELECOXIB 200 MG: 200 CAPSULE ORAL at 20:39

## 2018-06-25 RX ADMIN — MIDAZOLAM HYDROCHLORIDE 2 MG: 1 INJECTION, SOLUTION INTRAMUSCULAR; INTRAVENOUS at 06:44

## 2018-06-25 RX ADMIN — HYDROMORPHONE HYDROCHLORIDE 2 MG: 2 TABLET ORAL at 09:46

## 2018-06-25 RX ADMIN — LIDOCAINE HYDROCHLORIDE 0.1 ML: 10 INJECTION, SOLUTION INFILTRATION; PERINEURAL at 06:17

## 2018-06-25 RX ADMIN — ASPIRIN 81 MG: 81 TABLET, COATED ORAL at 09:46

## 2018-06-25 RX ADMIN — PROPOFOL 100 MG: 10 INJECTION, EMULSION INTRAVENOUS at 07:08

## 2018-06-25 RX ADMIN — ROPIVACAINE HYDROCHLORIDE 20 ML: 2 INJECTION, SOLUTION EPIDURAL; INFILTRATION; PERINEURAL at 06:48

## 2018-06-25 RX ADMIN — HYDROMORPHONE HYDROCHLORIDE 2 MG: 2 TABLET ORAL at 20:39

## 2018-06-25 RX ADMIN — HYDROMORPHONE HYDROCHLORIDE 0.5 MG: 2 INJECTION, SOLUTION INTRAMUSCULAR; INTRAVENOUS; SUBCUTANEOUS at 07:50

## 2018-06-25 RX ADMIN — CELECOXIB 200 MG: 200 CAPSULE ORAL at 09:46

## 2018-06-25 RX ADMIN — HYDROMORPHONE HYDROCHLORIDE 0.5 MG: 2 INJECTION, SOLUTION INTRAMUSCULAR; INTRAVENOUS; SUBCUTANEOUS at 07:30

## 2018-06-25 NOTE — ANESTHESIA PROCEDURE NOTES
Peripheral Block    Start time: 6/25/2018 6:44 AM  End time: 6/25/2018 6:48 AM  Performed by: Betty De Leon  Authorized by: Betty De Leon       Pre-procedure: Indications: at surgeon's request and post-op pain management    Preanesthetic Checklist: patient identified, risks and benefits discussed, site marked, timeout performed, anesthesia consent given and patient being monitored    Timeout Time: 06:44          Block Type:   Block Type:   Adductor canal  Laterality:  Right  Monitoring:  Standard ASA monitoring, continuous pulse ox, frequent vital sign checks, heart rate, oxygen and responsive to questions  Injection Technique:  Single shot  Procedures: ultrasound guided    Patient Position: supine  Prep: chlorhexidine    Location:  Mid thigh  Needle Type:  Stimuplex  Needle Gauge:  21 G  Needle Localization:  Ultrasound guidance and anatomical landmarks  Medication Injected:  0.2%  ropivacaine  Volume (mL):  20    Assessment:  Number of attempts:  1  Injection Assessment:  Incremental injection every 5 mL, local visualized surrounding nerve on ultrasound, negative aspiration for blood, no paresthesia, no intravascular symptoms and ultrasound image on chart  Patient tolerance:  Patient tolerated the procedure well with no immediate complications

## 2018-06-25 NOTE — ROUTINE PROCESS
[facility] [place] Notes Reviewed. Pt working on IS. Pt encouraged to do 10 breaths per hour while awake on IS. Good NPC. No respiratory distress noted at this time. No complications noted at this time. Monitor number 13 placed in room

## 2018-06-25 NOTE — PERIOP NOTES
TRANSFER - OUT REPORT:    Verbal report given to [facility] RN on Leanna Navarro  being transferred to [facility] room 338 for routine progression of care       Report consisted of patients Situation, Background, Assessment and   Recommendations(SBAR). Information from the following report(s) SBAR, Intake/Output and MAR was reviewed with the receiving nurse. Opportunity for questions and clarification was provided.       Patient transported with:   O2 @ 2 liters  Tech

## 2018-06-25 NOTE — PROGRESS NOTES
TRANSFER - IN REPORT:    Verbal report received from [facility] (name) on Baldo Gutiérrezamento  being received from PACU (unit) for routine progression of care      Report consisted of patients Situation, Background, Assessment and   Recommendations(SBAR). Information from the following report(s) SBAR, Kardex, OR Summary, Procedure Summary, Intake/Output, MAR, Accordion, Recent Results and Med Rec Status was reviewed with the receiving nurse. Opportunity for questions and clarification was provided. Assessment completed upon patients arrival to unit and care assumed.

## 2018-06-25 NOTE — IP AVS SNAPSHOT
[address] 
[phone] Patient: Leanna Navarro MRN: [id number] KKR:9[birth year]19/[birth year]73 About your hospitalization You were admitted on:  June 25, 2018 You last received care in the:  [facility] You were discharged on:  June 26, 2018 Why you were hospitalized Your primary diagnosis was:  Postoperative Stiffness Of Total Knee Replacement (Hcc) Your diagnoses also included:  Status Post Surgical Manipulation Of Knee Joint Follow-up Information Follow up With Details Comments Contact Info Irene Field MD   [address] [place] [id number] 
[phone] Judith Mae MD  Keep scheduled appointment [address] [address] 
[phone] Judith Mae MD   [address] [address] 
[phone] Your Scheduled Appointments Tuesday June 26, 2018 12:00 PM EDT  
PT Initial Visit with Ankur Peterson PT  
[facility] ([address]) [address] [facility] [address] [place] [id number] [phone] Please arrive 10-15 minutes prior to your appointment time. Wear comfortable clothing. Please bring your insurance cards with you. Please bring a list of your medications including herbal supplements. Please bring a list of your allergies including food allergies. Discharge Orders Procedure Order Date Status Priority Quantity Spec Type Associated Dx CALL YOUR DOCTOR For: Temperature greater than 100.4., Severe uncontrolled pain. , Persistant nausea and vomiting., Persistant dizziness or light-headedness. , Hives, Difficulty breathing, headache, or visual disturbances. , Redness, tenderness, or s. .. 06/25/18 1228 Normal Routine 1  Postoperative stiffness of total knee replacement, initial encounter ([facility] [[id number]] Questions: For:  Temperature greater than 100.4. For:  Severe uncontrolled pain. For:  Persistant nausea and vomiting. For:  Persistant dizziness or light-headedness. For:  Braxton Wilder For:  Difficulty breathing, headache, or visual disturbances. For:  Redness, tenderness, or signs of infection. ACTIVITY AFTER DISCHARGE Patient should: Restrict driving, Restrict lifting, Other (specify) 06/25/18 1228 Normal Routine 1  Postoperative stiffness of total knee replacement, initial encounter ([facility].) [[id number]] Questions: Patient should:  Restrict driving Patient should:  Restrict lifting Patient should: Other (specify) A check ya indicates which time of day the medication should be taken. My Medications START taking these medications Instructions Each Dose to Equal  
 Morning Noon Evening Bedtime HYDROmorphone 2 mg tablet Commonly known as:  DILAUDID Your next dose is:  noon Take 1 Tab by mouth every four (4) hours as needed. Max Daily Amount: 12 mg.  
 2 mg CONTINUE taking these medications Instructions Each Dose to Equal  
 Morning Noon Evening Bedtime  
 aspirin delayed-release 81 mg tablet Your last dose was:  today Your next dose is:  tomorrow Take 81 mg by mouth daily. 81 mg  
    
  
   
   
   
  
 calcium-vitamin D 500 mg(1,250mg) -200 unit per tablet Commonly known as:  OYSTER SHELL Your next dose is:  tomorrow Take 1 Tab by mouth daily. 1 Tab STOP taking these medications   
 traMADol 50 mg tablet Commonly known as:  ULTRAM  
   
  
 vitamin e 1,000 unit capsule Commonly known as:  E GEMS Where to Get Your Medications Information on where to get these meds will be given to you by the nurse or doctor. ! Ask your nurse or doctor about these medications HYDROmorphone 2 mg tablet Opioid Education Prescription Opioids: What You Need to Know: 
 
Prescription opioids can be used to help relieve moderate-to-severe pain and are often prescribed following a surgery or injury, or for certain health conditions. These medications can be an important part of treatment but also come with serious risks. Opioids are strong pain medicines. Examples include hydrocodone, oxycodone, fentanyl, and morphine. Heroin is an example of an illegal opioid. It is important to work with your health care provider to make sure you are getting the safest, most effective care. WHAT ARE THE RISKS AND SIDE EFFECTS OF OPIOID USE? Prescription opioids carry serious risks of addiction and overdose, especially with prolonged use. An opioid overdose, often marked by slow breathing, can cause sudden death. The use of prescription opioids can have a number of side effects as well, even when taken as directed. · Tolerance-meaning you might need to take more of a medication for the same pain relief · Physical dependence-meaning you have symptoms of withdrawal when the medication is stopped. Withdrawal symptoms can include nausea, sweating, chills, diarrhea, stomach cramps, and muscle aches. Withdrawal can last up to several weeks, depending on which drug you took and how long you took it. · Increased sensitivity to pain · Constipation · Nausea, vomiting, and dry mouth · Sleepiness and dizziness · Confusion · Depression · Low levels of testosterone that can result in lower sex drive, energy, and strength · Itching and sweating RISKS ARE GREATER WITH:      
· History of drug misuse, substance use disorder, or overdose · Mental health conditions (such as depression or anxiety) · Sleep apnea · Older age (72 years or older) · Pregnancy Avoid alcohol while taking prescription opioids. Also, unless specifically advised by your health care provider, medications to avoid include: · Benzodiazepines (such as Xanax or Valium) · Muscle relaxants (such as Soma or Flexeril) · Hypnotics (such as Ambien or Lunesta) · Other prescription opioids KNOW YOUR OPTIONS Talk to your health care provider about ways to manage your pain that don't involve prescription opioids. Some of these options may actually work better and have fewer risks and side effects. Options may include: 
· Pain relievers such as acetaminophen, ibuprofen, and naproxen · Some medications that are also used for depression or seizures · Physical therapy and exercise · Counseling to help patients learn how to cope better with triggers of pain and stress. · Application of heat or cold compress · Massage therapy · Relaxation techniques Be Informed Make sure you know the name of your medication, how much and how often to take it, and its potential risks & side effects. IF YOU ARE PRESCRIBED OPIOIDS FOR PAIN: 
· Never take opioids in greater amounts or more often than prescribed. Remember the goal is not to be pain-free but to manage your pain at a tolerable level. · Follow up with your primary care provider to: · Work together to create a plan on how to manage your pain. · Talk about ways to help manage your pain that don't involve prescription opioids. · Talk about any and all concerns and side effects. · Help prevent misuse and abuse. · Never sell or share prescription opioids · Help prevent misuse and abuse. · Store prescription opioids in a secure place and out of reach of others (this may include visitors, children, friends, and family). · Safely dispose of unused/unwanted prescription opioids: Find your community drug take-back program or your pharmacy mail-back program, or flush them down the toilet, following guidance from the Food and Drug Administration (www.fda.gov/Drugs/ResourcesForYou). · Visit www.cdc.gov/drugoverdose to learn about the risks of opioid abuse and overdose. · If you believe you may be struggling with addiction, tell your health care provider and ask for guidance or call [employer] at 6-824-617-MAYP. Discharge Instructions Total Knee Replacement Rehabilitation What is knee replacement rehabilitation? Knee replacement rehabilitation (rehab) is a series of exercises you do after your surgery. This helps you get back your knee's range of motion and strength. You will work with your doctor and physical therapist to plan this exercise program. To get the best results, you need to do the exercises correctly and as often and as long as your doctor or physical therapist tells you. Before you start any exercises, talk with your doctor or physical therapist. It is important that you know exactly how to do the exercises. Stop and call your doctor if you are not sure you are doing the exercises correctly or if you have any pain. Ice your knee after exercising if it is sore. Exercises after surgery These exercises can be done right after your surgery. You can do them in your hospital bed. Talk to your doctor before you start any of these exercises. He or she may tell you to do them in a certain way. You may need to do them several times each day. When you first do them, they may hurt. Your knee may click or pop. But these exercises will help you recover and may help your pain go away faster. [employer] · Lie or sit on your bed. · Tighten the thigh muscle of your affected leg. · Hold for about 6 seconds, then rest up to 10 seconds. · Relax your thigh and knee. · Do 8 to 10 repetitions several times during the day. Straight leg raises · Lie or sit on your bed. · Tighten the thigh muscle of your affected leg, and keep the knee straight. · Lift your leg several inches, and hold it for 5 to 10 seconds. · Slowly lower your leg. · Rest a minute. Repeat 8 to 12 times with each knee. Do this exercise every day, up to 3 times a day. Ankle pumps · Lie or sit on your bed. · Tighten the thigh and calf muscles of your affected leg. This will make your foot move up and down. · Do this for 2 to 3 minutes, 2 to 3 times an hour. Quadriceps (thigh) strengthening exercise · While sitting in a chair, straighten your leg and hold for 6 seconds. Then lower your leg and rest for up to 10 seconds. · Repeat 8 to 12 times with each leg. Do every day, up to 3 times a day. · When this thigh-strengthening exercise becomes easy, you can add a light weight to your ankle. Bed knee bends · Lie or sit on your bed. · Bend your affected knee by sliding your foot toward you. Stop when your knee no longer bends. · Hold this position for 15 to 30 seconds, and then slide your leg back down the bed. · Do this several times. Later exercises When you are able to walk on your own for a few steps or a short distance, you can try these exercises. Talk to your doctor before starting any of these exercises. He or she may tell you to do them in a certain way. Standing knee bends · Stand up straight, using a walker or crutches or hold onto something stable, such as a counter. · Lift the thigh of your affected leg so that your knee bends. · Lift as far as you can, and hold it for 5 to 10 seconds. · Lower your leg, and touch the floor with your heel first. 
· Do this 8 to 12 times or until your leg feels tired. Assisted knee bends · Lie on your back with your affected knee slightly bent. · Loop a towel over your knee, and slide it down so that is against your ankle. · Continue to bend your knee, pulling your ankle gently toward you with the towel. · Bend your knee as far as you can. · Hold for 15 to 30 seconds, and then relax your leg. · Do this 10 to 12 times or until your leg feels tired. Adding resistance About 4 to 6 weeks after surgery, you may be able to do the standing or assisted knee bends with light weights around your ankles. Begin with 1- to 2-pound weights, and slowly increase the weight as your knee and leg get stronger. Check with your doctor before you make any changes to your exercises. Follow-up care is a key part of your treatment and safety. Be sure to make and go to all appointments, and call your doctor if you are having problems. It's also a good idea to know your test results and keep a list of the medicines you take. Where can you learn more? Go to http://siobhan-lynne.info/. Enter U870 in the search box to learn more about \"Total Knee Replacement Rehabilitation. \" Current as of: March 21, 2017 Content Version: 11.4 © 7248-7638 [employer]. Care instructions adapted under license by [employer] (which disclaims liability or warranty for this information). If you have questions about a medical condition or this instruction, always ask your healthcare professional. [place] [address] any warranty or liability for your use of this information. DISCHARGE SUMMARY from Nurse PATIENT INSTRUCTIONS: 
 
 
F-face looks uneven A-arms unable to move or move unevenly S-speech slurred or non-existent T-time-call 911 as soon as signs and symptoms begin-DO NOT go Back to bed or wait to see if you get better-TIME IS BRAIN. Warning Signs of HEART ATTACK Call 911 if you have these symptoms: 
? Chest discomfort. Most heart attacks involve discomfort in the center of the chest that lasts more than a few minutes, or that goes away and comes back. It can feel like uncomfortable pressure, squeezing, fullness, or pain. ? Discomfort in other areas of the upper body. Symptoms can include pain or discomfort in one or both arms, the back, neck, jaw, or stomach. ? Shortness of breath with or without chest discomfort. ? Other signs may include breaking out in a cold sweat, nausea, or lightheadedness. Don't wait more than five minutes to call [address]! Fast action can save your life. Calling 911 is almost always the fastest way to get lifesaving treatment. Emergency Medical Services staff can begin treatment when they arrive  up to an hour sooner than if someone gets to the hospital by car. The discharge information has been reviewed with the {PATIENT PARENT GUARDIAN:92243}. The {PATIENT PARENT GUARDIAN:91961} verbalized understanding. Discharge medications reviewed with the {Dishcarge meds reviewed RGCB:75366} and appropriate educational materials and side effects teaching were provided. ___________________________________________________________________________________________________________________________________[facility] Patient Discharge Instructions Gilford Brought / [id number] : 1950 Admitted 2018 Discharged: 2018 IF YOU HAVE ANY PROBLEMS ONCE YOU ARE AT HOME CALL THE FOLLOWING NUMBERS:  
Main office number: [phone] Medications · The medications you are to continue on are listed on the medication reconciliation sheet. · It is important that you take the medication exactly as they are prescribed. · Medications which increase your risk of blood clots are listed to stop for 5 weeks after surgery as well as medications or supplements which increase your risk of bleeding complications. · Keep your medication in the bottles provided by the pharmacist and keep a list of the medication names, dosages, and times to be taken in your wallet. · Do not take other medications without consulting your doctor. What to do at [facility] Resume your prehospital diet. If you have excessive nausea or vomitting call your doctor's office Continue Physical Therapy Exercises Do not drive if taking narcotic pain medication When to Call - Call if you have a temperature greater then 101 - Are unable to bear any wieght  
- Need a pain medication refill - The dressing becomes saturated Information obtained by : 
I understand that if any problems occur once I am at home I am to contact my physician. I understand and acknowledge receipt of the instructions indicated above. Physician's or R.N.'s Signature                                                                  Date/Time Patient or Representative Signature                                                          Date/Time Introducing [facility] [place] [facility]! [employer] introduces [employer] patient portal. Now you can access parts of your medical record, email your doctor's office, and request medication refills online. 1. In your internet browser, go to [email or web address] Keep [email or web address] 2. Click on the First Time User? Click Here link in the Sign In box. You will see the New Member Sign Up page. 3. Enter your [employer] Access Code exactly as it appears below. You will not need to use this code after youve completed the sign-up process. If you do not sign up before the expiration date, you must request a new code. · [employer] Access Code: [email or web address] Expires: 9/2/2018  8:26 AM 
 
4. Enter the last four digits of your Social Security Number (xxxx) and Date of Birth (mm/dd/yyyy) as indicated and click Submit. You will be taken to the next sign-up page. 5. Create a [employer] ID. This will be your [employer] login ID and cannot be changed, so think of one that is secure and easy to remember. 6. Create a [employer] password. You can change your password at any time. 7. Enter your Password Reset Question and Answer. This can be used at a later time if you forget your password. 8. Enter your e-mail address. You will receive e-mail notification when new information is available in [facility] 9. Click Sign Up. You can now view and download portions of your medical record. 10. Click the Download Summary menu link to download a portable copy of your medical information. If you have questions, please visit the Frequently Asked Questions section of the [employer] website. Remember, [employer] is NOT to be used for urgent needs. For medical emergencies, dial 911. Now available from your iPhone and Android! Introducing Edwardo Valdovinos As a [employer] patient, I wanted to make you aware of our electronic visit tool called Edwardo Valdovinos. [employer] 24/7 allows you to connect within minutes with a medical provider 24 hours a day, seven days a week via a mobile device or tablet or logging into a secure website from your computer. You can access Edwardo Valdovinos from anywhere in the [place]. A virtual visit might be right for you when you have a simple condition and feel like you just dont want to get out of bed, or cant get away from work for an appointment, when your regular [employer] provider is not available (evenings, weekends or holidays), or when youre out of town and need minor care. Electronic visits cost only $49 and if the [employer] 24/7 provider determines a prescription is needed to treat your condition, one can be electronically transmitted to a nearby pharmacy*. Please take a moment to enroll today if you have not already done so. The enrollment process is free and takes just a few minutes.   To enroll, please download the [address] [place] [address] 24/7 minal to your tablet or phone, or visit [email or web address] to enroll on your computer. And, as an [address] [place] [address] patient with a [employer] account, the results of your visits will be scanned into your electronic medical record and your primary care provider will be able to view the scanned results. We urge you to continue to see your regular [address] [place] [address] provider for your ongoing medical care. And while your primary care provider may not be the one available when you seek a [employer] virtual visit, the peace of mind you get from getting a real diagnosis real time can be priceless. For more information on [employer], view our Frequently Asked Questions (FAQs) at [email or web address]. Sincerely, 
 
Esequiel Santillan MD 
Chief Medical Officer [employer] *:  certain medications cannot be prescribed via [employer] Unresulted tests-please follow up with your PCP on these results Procedure/Test Authorizing Provider XR KNEE RT MAX 2 VWS Katie Richard MD  
  
Providers Seen During Your Hospitalization Provider Specialty Primary office phone Katie Richard MD Orthopedic Surgery [phone] Your Primary Care Physician (PCP) Primary Care Physician Office Phone Office Fax [address] [facility] 55 [address] [facility] You are allergic to the following No active allergies Recent Documentation Height Weight BMI Smoking Status 1.613 m 60.3 kg 23.19 kg/m2 Never Smoker Emergency Contacts Name Discharge Info Relation Home Work Mobile Rony Stephens DISCHARGE CAREGIVER [3] Spouse [3] [phone] Patient Belongings  The following personal items are in your possession at time of discharge: 
  Dental Appliances: None  Visual Aid: None      Home Medications: None   Jewelry: Earrings, Watch, Ring  Clothing: Shirt, Pants, Footwear, Undergarments    Other Valuables: None Please provide this summary of care documentation to your next provider. Signatures-by signing, you are acknowledging that this After Visit Summary has been reviewed with you and you have received a copy. Patient Signature:  ____________________________________________________________ Date:  ____________________________________________________________  
  
Sigmund [place] Provider Signature:  ____________________________________________________________ Date:  ____________________________________________________________

## 2018-06-25 NOTE — PROGRESS NOTES
Problem: Mobility Impaired (Adult and Pediatric)  Goal: *Acute Goals and Plan of Care (Insert Text)  GOALS (1-4 days):  (1.)Ms. Daria Otero will move from supine to sit and sit to supine  in bed with MODIFIED INDEPENDENCE. (2.)Ms. Daria Otero will transfer from bed to chair and chair to bed with SUPERVISION using the least restrictive device. (3.)Ms. Daria Otero will ambulate with SUPERVISION for 300 feet with the least restrictive device. (4.)Ms. Daria Otero will ambulate up/down 1 steps with No railing with STAND BY ASSIST with walker. (5.)Ms. Daria Otero will increase right knee ROM to 0-115 deg.  ________________________________________________________________________________________________    PHYSICAL THERAPY [facility] tKa: Initial Assessment, Treatment Day: Day of Assessment, PM 6/25/2018  OBSERVATION: Hospital Day: 1  [place] / Product Type: Medicare /      NAME/AGE/GENDER: Yesenia Nur is a 76 y.o. female   PRIMARY DIAGNOSIS:  Stiffness of right knee [M25.661]   Procedure(s) and Anesthesia Type:     * MANIPULATION RIGHT KNEE/ FNB - Spinal (Right)  ICD-10: Treatment Diagnosis:    · Pain in Right Knee (M25.561)  · Stiffness of Right Knee, Not elsewhere classified (M25.661)  · Difficulty in walking, Not elsewhere classified (R26.2)      ASSESSMENT:     Ms. Daria Otero presents with impaired functional mobility s/p right TKA manipulation. This pt also was mildly unsteady with gait & transfers. This pt will benefit from acute & out pt PT follow up aggressive therapy to help restore functional mobility at right knee    This section established at most recent assessment   PROBLEM LIST (Impairments causing functional limitations):  1. Decreased Strength  2. Decreased ADL/Functional Activities  3. Decreased Transfer Abilities  4. Decreased Ambulation Ability/Technique  5. Decreased Balance  6. Increased Pain  7. Decreased Activity Tolerance  8. Decreased Flexibility/Joint Mobility  9.  Decreased Cecil with Home Exercise Program   INTERVENTIONS PLANNED: (Benefits and precautions of physical therapy have been discussed with the patient.)  1. Bed Mobility  2. Gait Training  3. Home Exercise Program (HEP)  4. Therapeutic Exercise/Strengthening  5. Transfer Training  6. Range of Motion: active/assisted/passive  7. Therapeutic Activities  8. Group Therapy     TREATMENT PLAN: Frequency/Duration: Follow patient BID for duration of hospital stay to address above goals. Rehabilitation Potential For Stated Goals: Good     RECOMMENDED REHABILITATION/EQUIPMENT: (at time of discharge pending progress): Continue Skilled Therapy and Outpatient: Physical Therapy. HISTORY:   History of Present Injury/Illness (Reason for Referral): The patient has undergone right TKA and has residual stiffness in her knee with pain. The patient was evaluated and examined during a consultation prior to this hospitalization. There have been no changes to the patient's orthopedic condition since that consultation. The patient has failed to gain ROM The necessity for joint manipulation is present. The patient will be admitted the day of surgery for Procedure(s) (LRB):  MANIPULATION RIGHT KNEE/ FNB (Right)    Past Medical History/Comorbidities:   Ms. Ramesh  has a past medical history of Environmental and seasonal allergies; GERD (gastroesophageal reflux disease); Neuropathy; Osteoporosis; Postoperative stiffness of total knee replacement ([address] (6/25/2018); Primary osteoarthritis of right knee; and Status post total right knee replacement (4/13/2018). Ms. Ramesh  has a past surgical history that includes hx tonsillectomy (1962); hx colonoscopy (2012); hx tubal ligation (1978); and hx knee replacement (Right, 04/13/2018).   Social History/Living Environment:   Home Environment: Private residence  # Steps to Enter: 1  Rails to Enter: No  One/Two Story Residence: One story  Living Alone: No  Support Systems: Spouse/Significant Other/Partner  Patient Expects to be Discharged to[de-identified] Private residence  Current DME Used/Available at Home: Walker, rolling  Prior Level of Function/Work/Activity:  Pt was functioning without an assistive device prior to this admission   Number of Personal Factors/Comorbidities that affect the Plan of Care: 1-2: MODERATE COMPLEXITY   EXAMINATION:   Most Recent Physical Functioning:   Gross Assessment: Yes  Gross Assessment  AROM: Within functional limits (Left LE)  Strength: Within functional limits (Left LE)  Coordination: Within functional limits (Left LE)         RLE PROM  R Knee Flexion: 115 (in CPM)  R Knee Extension: 0 (in CPM)           Bed Mobility  Supine to Sit: Stand-by assistance  Sit to Supine: Stand-by assistance  Scooting: Stand-by assistance    Transfers  Sit to Stand: Stand-by assistance  Stand to Sit: Stand-by assistance  Bed to Chair: Stand-by assistance (with walker)    Balance  Sitting: Intact; Without support  Standing: Impaired; With support (walker)              Weight Bearing Status  Right Side Weight Bearing: As tolerated  Distance (ft): 200 Feet (ft)  Ambulation - Level of Assistance: Stand-by assistance  Assistive Device: Walker, rolling  Speed/Michela: Delayed  Step Length: Left shortened  Stance: Right decreased  Gait Abnormalities: Antalgic;Decreased step clearance        Braces/Orthotics: none           Body Structures Involved:  1. Joints  2. Muscles Body Functions Affected:  1. Sensory/Pain  2. Movement Related Activities and Participation Affected:  1. General Tasks and Demands  2. Mobility   Number of elements that affect the Plan of Care: 4+: HIGH COMPLEXITY   CLINICAL PRESENTATION:   Presentation: Stable and uncomplicated: LOW COMPLEXITY   CLINICAL DECISION MAKIN [facility] [address] [id number] AM-PAC 6 Clicks   Basic Mobility Inpatient Short Form  How much difficulty does the patient currently have. .. Unable A Lot A Little None   1.   Turning over in bed (including adjusting bedclothes, sheets and blankets)? [] 1   [] 2   [x] 3   [] 4   2. Sitting down on and standing up from a chair with arms ( e.g., wheelchair, bedside commode, etc.)   [] 1   [] 2   [x] 3   [] 4   3. Moving from lying on back to sitting on the side of the bed? [] 1   [] 2   [x] 3   [] 4   How much help from another person does the patient currently need. .. Total A Lot A Little None   4. Moving to and from a bed to a chair (including a wheelchair)? [] 1   [] 2   [x] 3   [] 4   5. Need to walk in hospital room? [] 1   [] 2   [x] 3   [] 4   6. Climbing 3-5 steps with a railing? [x] 1   [] 2   [] 3   [] 4   © 2007, Trustees of [address] [place] [id number], under license to [employer]. All rights reserved        Score:  Initial: 16 Most Recent: X (Date: -- )    Interpretation of Tool:  Represents activities that are increasingly more difficult (i.e. Bed mobility, Transfers, Gait). Score 24 23 22-20 19-15 14-10 9-7 6     Modifier CH CI CJ CK CL CM CN      ? Mobility - Walking and Moving Around:     - CURRENT STATUS: CK - 40%-59% impaired, limited or restricted    - GOAL STATUS: CJ - 20%-39% impaired, limited or restricted    - D/C STATUS:  ---------------To be determined---------------  [place] / [place] / Product Type: Medicare /      Medical Necessity:     · Patient is expected to demonstrate progress in strength, range of motion, balance, coordination and functional technique to decrease assistance required with bed mobility, transfers, gait & right knee ROM. Reason for Services/Other Comments:  · Patient continues to require skilled intervention due to pt not functional with right knee mobility.    Use of outcome tool(s) and clinical judgement create a POC that gives a: Clear prediction of patient's progress: LOW COMPLEXITY            TREATMENT:   (In addition to Assessment/Re-Assessment sessions the following treatments were rendered)     Pre-treatment Symptoms/Complaints: Stiffness right knee  Pain: Initial: visual scale  Pain Intensity 1: 4  Pain Location 1: Knee  Pain Orientation 1: Right  Pain Intervention(s) 1: Ambulation/Increased Activity, Cold pack  Post Session:  4/10     Therapeutic Exercise: (15 Minutes):  Exercises per grid below to improve mobility and dynamic movement of leg - right to improve functional endurance. Required minimal verbal cues to promote proper body alignment and promote proper body mechanics. Progressed range and repetitions as indicated. Assessment/15 min     Date:  6/25 Date:   Date:     ACTIVITY/EXERCISE AM PM AM PM AM PM   GROUP THERAPY  []  []  []  []  []  []   Ankle Pumps  25       Quad Sets  25       Gluteal Sets  25       Hip ABd/ADduction  25       Straight Leg Raises  25       Knee Slides  25       Short Arc Quads  25       Long Arc Quads         Chair Slides  25                B = bilateral; AA = active assistive; A = active; P = passive      Treatment/Session Assessment:     Response to Treatment:  Tolerated only fair    Education:  [x] Home Exercises  [x] Fall Precautions  [] Hip Precautions [] D/C Instruction Review  [] Knee/Hip Prosthesis Review  [x] Walker Management/Safety [] Adaptive Equipment as Needed       Interdisciplinary Collaboration:   o Registered Nurse  o Rehabilitation Attendant    After treatment position/precautions:   o Supine in bed  o Bed/Chair-wheels locked  o Bed in low position  o Call light within reach  o RN notified  o cpm in place 0-115 deg    Compliance with Program/Exercises: Will assess as treatment progresses. Recommendations/Intent for next treatment session:  Treatment next visit will focus on increasing Ms. Stephens's independence with bed mobility, transfers, gait training, strength/ROM exercises, modalities for pain, and patient education.       Total Treatment Duration:  PT Patient Time In/Time Out  Time In: 1600  Time Out: Poornima Mckoy [address] Susan Hampton, PT

## 2018-06-25 NOTE — IP AVS SNAPSHOT
Jorgechristian Jensen 
 
 
 [address] 
[phone] Patient: Carmen Wood MRN: [id number] TBA:1/83/3564 A check ya indicates which time of day the medication should be taken. My Medications START taking these medications Instructions Each Dose to Equal  
 Morning Noon Evening Bedtime HYDROmorphone 2 mg tablet Commonly known as:  DILAUDID Your next dose is:  noon Take 1 Tab by mouth every four (4) hours as needed. Max Daily Amount: 12 mg.  
 2 mg CONTINUE taking these medications Instructions Each Dose to Equal  
 Morning Noon Evening Bedtime  
 aspirin delayed-release 81 mg tablet Your last dose was:  today Your next dose is:  tomorrow Take 81 mg by mouth daily. 81 mg  
    
  
   
   
   
  
 calcium-vitamin D 500 mg(1,250mg) -200 unit per tablet Commonly known as:  OYSTER SHELL Your next dose is:  tomorrow Take 1 Tab by mouth daily. 1 Tab STOP taking these medications   
 traMADol 50 mg tablet Commonly known as:  ULTRAM  
   
  
 vitamin e 1,000 unit capsule Commonly known as:  E GEMS Where to Get Your Medications Information on where to get these meds will be given to you by the nurse or doctor. ! Ask your nurse or doctor about these medications HYDROmorphone 2 mg tablet

## 2018-06-25 NOTE — PROGRESS NOTES
PT placed  to 0 deg to right LE ~ 30 post recovery from manipulation right TKA. Pt tolerated setting well. PT to follow up this pm to assess function & have pt perform exercises.   Karissa Jain, PT, ODALIS

## 2018-06-25 NOTE — PROGRESS NOTES
Virtual Utilization Review RN  has determined this patient meets the Condition Code 44 criteria under the Medicare guidelines for change from Inpatient to observation status. Admission status has been changed in the computer system. A copy of the Utilization Review RN documentation and the [facility] letter explaining the outpatient/observation services was given to patient/family representative with verbal explanation and verbal understanding was received about information given. Letter signed by patient with date and time. Signed copy placed in the patient's chart for scanning by HIS and copy left at bedside for patient information.        DCR

## 2018-06-25 NOTE — H&P
[address] [facility]  Pre Operative History and Physical Exam    Patient ID:  Nolberto Godoy  [id number]  6 y.o.  1950    Today: June 25, 2018       Assessment:   1. Stiffness right TKA         Plan:    1. Proceed with scheduled Procedure(s) (LRB):  MANIPULATION RIGHT KNEE/ FNB (Right)            CC:  Right knee stiffness and pain    HPI:   The patient has undergone right TKA and has residual stiffness in her knee with pain. The patient was evaluated and examined during a consultation prior to this hospitalization. There have been no changes to the patient's orthopedic condition since that consultation. The patient has failed to gain ROM The necessity for joint manipulation is present.  The patient will be admitted the day of surgery for Procedure(s) (LRB):  MANIPULATION RIGHT KNEE/ FNB (Right)      Past Medical/Surgical History:  Past Medical History:   Diagnosis Date    Environmental and seasonal allergies     GERD (gastroesophageal reflux disease)     diet controlled     Neuropathy     bilateral lower extremities     Osteoporosis     Primary osteoarthritis of right knee     Status post total right knee replacement 4/13/2018     Past Surgical History:   Procedure Laterality Date    HX COLONOSCOPY  2012    HX KNEE REPLACEMENT Right 04/13/2018    HX TONSILLECTOMY  1962    HX TUBAL LIGATION  1978        Allergies: No Known Allergies     Physical Exam:   General: NAD, Alert, Oriented, Appears their stated age     [de-identified]: NC/AT, PERRL    Skin: No rashes, lesions or wounds seen      Psych: normal affect      Heart: Regular Rate, Rhythm     Lungs: unlabored respirations, normal breath sounds     Abdomen: Soft and non-distended     Ortho: Pain with limited ROM of the right knee    Neuro: no focal defects, sensation is equal bilaterally     Lymph: no lymphadenopathy     Meds:   Current Facility-Administered Medications   Medication Dose Route Frequency    lidocaine (XYLOCAINE) 10 mg/mL (1 %) injection 0.1 mL  0.1 mL SubCUTAneous PRN    lactated Ringers infusion 1,000 mL  1,000 mL IntraVENous CONTINUOUS    fentaNYL citrate (PF) injection 100 mcg  100 mcg IntraVENous PRN     Facility-Administered Medications Ordered in Other Encounters   Medication Dose Route Frequency    ropivacaine (NAROPIN) 2 mg/mL (0.2 %) injection   Peripheral Nerve Block PRN         Labs:  Admission on 04/13/2018, Discharged on 04/16/2018   Component Date Value Ref Range Status    PPD 04/14/2018 Negative  Negative Preliminary    mm Induration 04/14/2018 0  mm Preliminary    mm Induration 04/15/2018 0  mm Final    0    mm Induration 04/16/2018 0  mm Final    0    Crossmatch Expiration 04/13/2018 04/16/2018   Final    ABO/Rh(D) 04/13/2018 O POSITIVE   Final    Antibody screen 04/13/2018 NEG   Final    Glucose (POC) 04/13/2018 85  65 - 100 mg/dL Final    Comment: 47 - 60 mg/dl Consistent with, but not fully diagnostic of hypoglycemia. 101 - 125 mg/dl Impaired fasting glucose/consistent with pre-diabetes mellitus  > 126 mg/dl Fasting glucose consistent with overt diabetes mellitus      HGB 04/13/2018 11.5* 11.7 - 15.4 g/dL Final    HGB 04/14/2018 10.9* 11.7 - 15.4 g/dL Final    Sodium 04/14/2018 140  136 - 145 mmol/L Final    Potassium 04/14/2018 4.4  3.5 - 5.1 mmol/L Final    Chloride 04/14/2018 106  98 - 107 mmol/L Final    CO2 04/14/2018 29  21 - 32 mmol/L Final    Anion gap 04/14/2018 5* 7 - 16 mmol/L Final    Glucose 04/14/2018 120* 65 - 100 mg/dL Final    Comment: 47 - 60 mg/dl Consistent with, but not fully diagnostic of hypoglycemia.   101 - 125 mg/dl Impaired fasting glucose/consistent with pre-diabetes mellitus  > 126 mg/dl Fasting glucose consistent with overt diabetes mellitus      BUN 04/14/2018 12  8 - 23 MG/DL Final    Creatinine 04/14/2018 0.68  0.6 - 1.0 MG/DL Final    GFR est AA 04/14/2018 >60  >60 ml/min/1.73m2 Final    GFR est non-AA 04/14/2018 >60  >60 ml/min/1.73m2 Final    Comment: (NOTE)  Estimated GFR is calculated using the Modification of Diet in Renal   Disease (MDRD) Study equation, reported for both  Americans   (GFRAA) and non- Americans (GFRNA), and normalized to 1.73m2   body surface area. The physician must decide which value applies to   the patient. The MDRD study equation should only be used in   individuals age 25 or older. It has not been validated for the   following: pregnant women, patients with serious comorbid conditions,   or on certain medications, or persons with extremes of body size,   muscle mass, or nutritional status.  Calcium 04/14/2018 8.7  8.3 - 10.4 MG/DL Final    HGB 04/15/2018 9.3* 11.7 - 15.4 g/dL Final    HGB 04/16/2018 9.9* 11.7 - 15.4 g/dL Final   Hospital Outpatient Visit on 03/26/2018   Component Date Value Ref Range Status    WBC 03/26/2018 4.5  4.3 - 11.1 K/uL Final    RBC 03/26/2018 4.42  4.05 - 5.25 M/uL Final    HGB 03/26/2018 13.0  11.7 - 15.4 g/dL Final    HCT 03/26/2018 39.2  35.8 - 46.3 % Final    MCV 03/26/2018 88.7  79.6 - 97.8 FL Final    MCH 03/26/2018 29.4  26.1 - 32.9 PG Final    MCHC 03/26/2018 33.2  31.4 - 35.0 g/dL Final    RDW 03/26/2018 12.9  11.9 - 14.6 % Final    PLATELET 95/82/7875 274  150 - 450 K/uL Final    MPV 03/26/2018 10.3* 10.8 - 14.1 FL Final    DF 03/26/2018 AUTOMATED    Final    NEUTROPHILS 03/26/2018 59  43 - 78 % Final    LYMPHOCYTES 03/26/2018 33  13 - 44 % Final    MONOCYTES 03/26/2018 7  4.0 - 12.0 % Final    EOSINOPHILS 03/26/2018 1  0.5 - 7.8 % Final    BASOPHILS 03/26/2018 0  0.0 - 2.0 % Final    IMMATURE GRANULOCYTES 03/26/2018 0  0.0 - 5.0 % Final    ABS. NEUTROPHILS 03/26/2018 2.6  1.7 - 8.2 K/UL Final    ABS. LYMPHOCYTES 03/26/2018 1.5  0.5 - 4.6 K/UL Final    ABS. MONOCYTES 03/26/2018 0.3  0.1 - 1.3 K/UL Final    ABS. EOSINOPHILS 03/26/2018 0.1  0.0 - 0.8 K/UL Final    ABS. BASOPHILS 03/26/2018 0.0  0.0 - 0.2 K/UL Final    ABS. IMM.  GRANS. 03/26/2018 0.0  0.0 - 0.5 K/UL Final    Prothrombin time 03/26/2018 12.5  11.5 - 14.5 sec Final    ** Note new reference range and method **    INR 03/26/2018 0.9    Final    Comment: Suggested therapeutic INR range:  Venous thrombosis and embolus  2.0-3.0  Prosthetic heart valve         2.5-3.5  ** Note new reference range and method **      aPTT 03/26/2018 28.0  23.2 - 35.3 SEC Final    Comment: Heparin Therapeutic Range = 74 - 123 seconds  In addition to factor deficiency, monitoring heparin therapy, etc., evaluation of a prolonged aPTT result should include consideration of preanalytic variables such as heparin flush contamination, specimen integrity issues, etc.  ** Note new reference range and method **      Sodium 03/26/2018 144  136 - 145 mmol/L Final    Potassium 03/26/2018 3.8  3.5 - 5.1 mmol/L Final    Chloride 03/26/2018 107  98 - 107 mmol/L Final    CO2 03/26/2018 24  21 - 32 mmol/L Final    Anion gap 03/26/2018 13  7 - 16 mmol/L Final    Glucose 03/26/2018 84  65 - 100 mg/dL Final    Comment: 47 - 60 mg/dl Consistent with, but not fully diagnostic of hypoglycemia. 101 - 125 mg/dl Impaired fasting glucose/consistent with pre-diabetes mellitus  > 126 mg/dl Fasting glucose consistent with overt diabetes mellitus      BUN 03/26/2018 11  8 - 23 MG/DL Final    Creatinine 03/26/2018 0.75  0.6 - 1.0 MG/DL Final    GFR est AA 03/26/2018 >60  >60 ml/min/1.73m2 Final    GFR est non-AA 03/26/2018 >60  >60 ml/min/1.73m2 Final    Comment: (NOTE)  Estimated GFR is calculated using the Modification of Diet in Renal   Disease (MDRD) Study equation, reported for both  Americans   (GFRAA) and non- Americans (GFRNA), and normalized to 1.73m2   body surface area. The physician must decide which value applies to   the patient. The MDRD study equation should only be used in   individuals age 25 or older.  It has not been validated for the   following: pregnant women, patients with serious comorbid conditions,   or on certain medications, or persons with extremes of body size,   muscle mass, or nutritional status.  Calcium 03/26/2018 9.7  8.3 - 10.4 MG/DL Final    Color 03/26/2018 YELLOW    Final    Appearance 03/26/2018 CLEAR    Final    Specific gravity 03/26/2018 1.005  1.001 - 1.023   Final    pH (UA) 03/26/2018 5.5  5.0 - 9.0   Final    Protein 03/26/2018 NEGATIVE   NEG mg/dL Final    Glucose 03/26/2018 NEGATIVE   mg/dL Final    Ketone 03/26/2018 NEGATIVE   NEG mg/dL Final    Bilirubin 03/26/2018 NEGATIVE   NEG   Final    Blood 03/26/2018 NEGATIVE   NEG   Final    Urobilinogen 03/26/2018 0.2  0.2 - 1.0 EU/dL Final    Nitrites 03/26/2018 NEGATIVE   NEG   Final    Leukocyte Esterase 03/26/2018 NEGATIVE   NEG   Final    Special Requests: 03/26/2018 NO SPECIAL REQUESTS    Final    Culture result: 03/26/2018 SA target not detected. A MRSA NEGATIVE, SA NEGATIVE test result does not preclude MRSA or SA nasal colonization.     Final    Ventricular Rate 03/26/2018 57  BPM Final    Atrial Rate 03/26/2018 57  BPM Final    P-R Interval 03/26/2018 190  ms Final    QRS Duration 03/26/2018 74  ms Final    Q-T Interval 03/26/2018 410  ms Final    QTC Calculation (Bezet) 03/26/2018 399  ms Final    Calculated P Axis 03/26/2018 41  degrees Final    Calculated R Axis 03/26/2018 41  degrees Final    Calculated T Axis 03/26/2018 15  degrees Final    Diagnosis 03/26/2018    Final                    Value:Sinus bradycardia with Fusion complexes  Otherwise normal ECG  No previous ECGs available  Confirmed by Ranjeet Roy MD (), GARETT THOMAS ([id number]) on 3/26/2018 12:17:12 PM                   Patient Active Problem List   Diagnosis Code    Snoring R06.83    Status post total right knee replacement Z96.651         Signed By: Ricardo Councilman., MD  June 25, 2018

## 2018-06-25 NOTE — ANESTHESIA PREPROCEDURE EVALUATION
Anesthetic History   No history of anesthetic complications            Review of Systems / Medical History  Patient summary reviewed and pertinent labs reviewed    Pulmonary  Within defined limits                 Neuro/Psych   Within defined limits           Cardiovascular  Within defined limits                Exercise tolerance: >4 METS     GI/Hepatic/Renal     GERD: well controlled           Endo/Other  Within defined limits           Other Findings              Physical Exam    Airway  Mallampati: II  TM Distance: 4 - 6 cm  Neck ROM: normal range of motion   Mouth opening: Normal     Cardiovascular  Regular rate and rhythm,  S1 and S2 normal,  no murmur, click, rub, or gallop             Dental         Pulmonary  Breath sounds clear to auscultation               Abdominal  GI exam deferred       Other Findings            Anesthetic Plan    ASA: 1  Anesthesia type: total IV anesthesia      Post-op pain plan if not by surgeon: peripheral nerve block single      Anesthetic plan and risks discussed with: Patient

## 2018-06-25 NOTE — OP NOTES
[id number] [facility]  Closed Manipulation of Total Knee Arthroplasty    Lauren Gonsalves   : 1950  Medical Record [id number]  Pre-operative Diagnosis:  Stiffness of right knee [M25.661] status post knee replacement  Post-operative Diagnosis: Same  Location: [address] [place] [address]  Surgeon: Esperanza Contreras MD    Anesthesia: Spinal  EBL: none    Procedure: The patient was brought to the PACU. A femoral nerve block was administered. The patient the received general anesthesia after a timeout was done. A closed manipulation was performed on the right Knee. Preoperatively the ROM was 7 to 85 degrees. The knee was manipulated. Upon completion the ROM was 2 to 125 degrees. Ligaments were stable. Extensor mechanism was intact. Ice was applied to the knee.  The patient tolerated the procedure w/o difficulty         Signed By: Esperanza Contreras MD  2018,  7:20 AM

## 2018-06-25 NOTE — PROGRESS NOTES
Admission Assessment Complete. Pt had a manipulation of right knee. Pt is A&Ox 3.  +2 pedal pulses with purposeful movement in all four extremities. IVF inusing. Pt denies any pain or need at this time. Bed low and locked. Side rails x3. Call light with in reach. Pt verbalizes understanding of call light.

## 2018-06-25 NOTE — ANESTHESIA POSTPROCEDURE EVALUATION
Post-Anesthesia Evaluation and Assessment    Patient: Jaxon Canas MRN: [id number]  SSN: [id number]    YOB: 1950  Age: 76 y.o. Sex: female       Cardiovascular Function/Vital Signs  Visit Vitals    /71 (BP 1 Location: Right arm, BP Patient Position: At rest)    Pulse 64    Temp 35.9 °C (96.6 °F)    Resp 16    Ht 5' 3.5\" (1.613 m)    Wt 60.3 kg (133 lb)    SpO2 97%    BMI 23.19 kg/m2       Patient is status post total IV anesthesia anesthesia for Procedure(s):  MANIPULATION RIGHT KNEE/ FNB. Nausea/Vomiting: None    Postoperative hydration reviewed and adequate. Pain:  Pain Scale 1: Numeric (0 - 10) (06/25/18 0948)  Pain Intensity 1: 5 (06/25/18 0948)   Managed    Neurological Status:   Neuro (WDL): Exceptions to WDL (06/25/18 0820)  Neuro  Neurologic State: Alert; Appropriate for age (06/25/18 5036)  Orientation Level: Oriented X4 (06/25/18 0902)  Cognition: Appropriate decision making; Appropriate for age attention/concentration; Appropriate safety awareness (06/25/18 0902)   At baseline    Mental Status and Level of Consciousness: Arousable    Pulmonary Status:   O2 Device: Nasal cannula (06/25/18 0715)   Adequate oxygenation and airway patent    Complications related to anesthesia: None    Post-anesthesia assessment completed.  No concerns    Signed By: Bella Andersen MD     June 25, 2018

## 2018-06-26 ENCOUNTER — HOSPITAL ENCOUNTER (OUTPATIENT)
Dept: PHYSICAL THERAPY | Age: 68
Discharge: HOME | End: 2018-06-26
Payer: MEDICARE

## 2018-06-26 VITALS
SYSTOLIC BLOOD PRESSURE: 134 MMHG | HEART RATE: 76 BPM | DIASTOLIC BLOOD PRESSURE: 75 MMHG | BODY MASS INDEX: 22.71 KG/M2 | OXYGEN SATURATION: 97 % | RESPIRATION RATE: 16 BRPM | HEIGHT: 64 IN | WEIGHT: 133 LBS | TEMPERATURE: 98 F

## 2018-06-26 PROCEDURE — G8979 MOBILITY GOAL STATUS: HCPCS

## 2018-06-26 PROCEDURE — 97110 THERAPEUTIC EXERCISES: CPT

## 2018-06-26 PROCEDURE — G8978 MOBILITY CURRENT STATUS: HCPCS

## 2018-06-26 PROCEDURE — 97530 THERAPEUTIC ACTIVITIES: CPT

## 2018-06-26 PROCEDURE — 97161 PT EVAL LOW COMPLEX 20 MIN: CPT

## 2018-06-26 PROCEDURE — 94760 N-INVAS EAR/PLS OXIMETRY 1: CPT

## 2018-06-26 PROCEDURE — 74011250637 HC RX REV CODE- 250/637: Performed by: ORTHOPAEDIC SURGERY

## 2018-06-26 PROCEDURE — G8980 MOBILITY D/C STATUS: HCPCS

## 2018-06-26 PROCEDURE — 99218 HC RM OBSERVATION: CPT

## 2018-06-26 RX ADMIN — ASPIRIN 81 MG: 81 TABLET, COATED ORAL at 08:02

## 2018-06-26 RX ADMIN — TRAMADOL HYDROCHLORIDE 50 MG: 50 TABLET, FILM COATED ORAL at 08:02

## 2018-06-26 RX ADMIN — CELECOXIB 200 MG: 200 CAPSULE ORAL at 08:02

## 2018-06-26 RX ADMIN — HYDROMORPHONE HYDROCHLORIDE 2 MG: 2 TABLET ORAL at 08:02

## 2018-06-26 NOTE — THERAPY EVALUATION
Yesenia Civil  : 1950  [place] / [place] / Product Type: Medicare /  [address]  at [place] [address] Therapy  [address] [place] [address], [facility] [address]  [phone]   [phone]         OUTPATIENT PHYSICAL THERAPY:Initial Assessment 2018    ICD-10: Treatment Diagnosis:   M25.561 Pain in right knee       M25.661 Stiffness of right knee, not elsewhere classified       R26.2 Difficulty in walking, not elsewhere classified        Precautions/Allergies:   Review of patient's allergies indicates no known allergies. Fall Risk Score: 1 (? 5 = High Risk)  MD Orders: Eval and treat  MEDICAL/REFERRING DIAGNOSIS:  Presence of right artificial knee joint [Z96.651]   DATE OF ONSET: 2018  REFERRING PHYSICIAN: Jamie Mendoza., *  RETURN PHYSICIAN APPOINTMENT: 4 weeks     INITIAL ASSESSMENT:  Ms. Daria Hernándezbinder presents     PROBLEM LIST (Impacting functional limitations):  1. Decreased Strength  2. Decreased ADL/Functional Activities  3. Decreased Ambulation Ability/Technique  4. Increased Pain  5. Decreased Flexibility/Joint Mobility  6. Edema/Girth INTERVENTIONS PLANNED:  1. Gait Training  2. Home Exercise Program (HEP)  3. Manual Therapy  4. Range of Motion (ROM)  5. Therapeutic Activites  6. Therapeutic Exercise/Strengthening   TREATMENT PLAN:  Effective Dates: 2018 TO 2018 (90 days). Frequency/Duration: 2 times a week for 90and upon reassessment, will adjust frequency and duration as progress indicates. GOALS: (Goals have been discussed and agreed upon with patient.)  Short-Term Functional Goals: Time Frame: 4 weeks   1. Establish independent HEP with no cueing. Discharge Goals: Time Frame: 12 weeks  1. Improve score on  Rehabilitation Potential For Stated Goals: Good  Regarding Arlyne Filler therapy, I certify that the treatment plan above will be carried out by a therapist or under their direction.   Thank you for this referral,  Francesco Arreaga PT     Referring Physician Signature: Emma Fernandez., *              Date                    The information in this section was collected on 6/26/18(except where otherwise noted). HISTORY:   History of Present Injury/Illness (Reason for Referral):    Past Medical History/Comorbidities:   Ms. Desirae Manriquez  has a past medical history of Environmental and seasonal allergies; GERD (gastroesophageal reflux disease); Neuropathy; Osteoporosis; Postoperative stiffness of total knee replacement ([facility] [address] (6/25/2018); Primary osteoarthritis of right knee; and Status post total right knee replacement (4/13/2018). Ms. Desirae Manriquez  has a past surgical history that includes hx tonsillectomy (1962); hx colonoscopy (2012); hx tubal ligation (1978); and hx knee replacement (Right, 04/13/2018). Social History/Living Environment:     Lives with spouse   Prior Level of Function/Work/Activity:  Retired but works part time cleaning homes   Dominant Side:         RIGHT  Current Medications:       Current Outpatient Prescriptions:     HYDROmorphone (DILAUDID) 2 mg tablet, Take 1 Tab by mouth every four (4) hours as needed. Max Daily Amount: 12 mg., Disp: 40 Tab, Rfl: 0    aspirin delayed-release 81 mg tablet, Take 81 mg by mouth daily. , Disp: , Rfl:     calcium-vitamin D (OYSTER SHELL) 500 mg(1,250mg) -200 unit per tablet, Take 1 Tab by mouth daily. , Disp: , Rfl:   No current facility-administered medications for this encounter. Date Last Reviewed:  6/26/18   Number of Personal Factors/Comorbidities that affect the Plan of Care: 1-2: MODERATE COMPLEXITY   EXAMINATION:   Observation/Orthostatic Postural Assessment:            Palpation:            Functional Mobility:         Gait/Ambulation:  Independent with cane        Stairs:  Difficulty with stairs   ROM:                                            Strength:                          Special Tests:   Neurological Screen:   Balance:     Body Structures Involved:  1. Bones  2. Joints  3. Muscles  4. Ligaments Body Functions Affected:  1. Sensory/Pain  2. Neuromusculoskeletal  3. Movement Related Activities and Participation Affected:  1. Learning and Applying Knowledge  2. General Tasks and Demands  3. Mobility  4. Domestic Life  5. Community, Social and [place]   Number of elements (examined above) that affect the Plan of Care: 3: MODERATE COMPLEXITY   CLINICAL PRESENTATION:   Presentation: Evolving clinical presentation with changing clinical characteristics: MODERATE COMPLEXITY   CLINICAL DECISION MAKING:   Outcome Measure: Tool Used: Lower Extremity Functional Scale (LEFS)  Score:  Initial: 33/80 Most Recent: X/80 (Date: -- )   Interpretation of Score: 20 questions each scored on a 5 point scale with 0 representing \"extreme difficulty or unable to perform\" and 4 representing \"no difficulty\". The lower the score, the greater the functional disability. 80/80 represents no disability. Minimal detectable change is 9 points. Score 80 79-63 62-48 47-32 31-16 15-1 0   Modifier CH CI CJ CK CL CM CN     ? Mobility - Walking and Moving Around:     - CURRENT STATUS: CK - 40%-59% impaired, limited or restricted    - GOAL STATUS: CJ - 20%-39% impaired, limited or restricted    - D/C STATUS:  ---------------To be determined---------------    Medical Necessity:   · Patient is expected to demonstrate progress in strength, range of motion and gait to increase independence with home and community activities. Reason for Services/Other Comments:  · Patient continues to require skilled intervention due to decreased ROM and strength of R knee and LE. Use of outcome tool(s) and clinical judgement create a POC that gives a: Clear prediction of patient's progress: LOW COMPLEXITY            TREATMENT:   (In addition to Assessment/Re-Assessment sessions the following treatments were rendered)  Pre-treatment Symptoms/Complaints:  Pain and stiffness of R knee. Weakness of LE's. Fair gait   Pain: Initial:   Pain Intensity 1: 3  Post Session:  2     THERAPEUTIC EXERCISE: (0 minutes):  Exercises per grid below to improve mobility, strength and gait. Required moderate verbal and manual cues to promote proper body alignment, promote proper body posture and promote proper body mechanics. Progressed resistance, range and repetitions as indicated. Evaluation (  xx   ):    Manual Therapy (      ): Patella and patella tendon mobs. Soft tissue work to [employer] and HS. PROM for knee flex and extn. Therapeutic Modalities:    HEP: As above; handouts given to patient for all exercises. [employer] Portal    Treatment/Session Assessment:    · Response to Treatment:  . · Compliance with Program/Exercises: Will assess as treatment progresses. · Recommendations/Intent for next treatment session: \"Next visit will focus on aggressive ROM and strengthening of R knee and LE\".   Total Treatment Duration:  PT Patient Time In/Time Out  Time In: 1200  Time Out: 0100  Treatment number  1  Kellee Matson, PT

## 2018-06-26 NOTE — PROGRESS NOTES
Problem: Mobility Impaired (Adult and Pediatric)  Goal: *Acute Goals and Plan of Care (Insert Text)  GOALS (1-4 days):  (1.)Ms. Fannie Wright will move from supine to sit and sit to supine  in bed with MODIFIED INDEPENDENCE. (2.)Ms. Fannie Wright will transfer from bed to chair and chair to bed with SUPERVISION using the least restrictive device. (3.)Ms. Fannie Wright will ambulate with SUPERVISION for 300 feet with the least restrictive device. (4.)Ms. Fannie Wright will ambulate up/down 1 steps with No railing with STAND BY ASSIST with walker. (5.)Ms. Fannie Wright will increase right knee ROM to 0-115 deg.  ________________________________________________________________________________________________    PHYSICAL THERAPY Joint camp tKa: Daily Note, Treatment Day: 1st, AM 6/26/2018  OBSERVATION: Hospital Day: 2  [place] / Product Type: Medicare /      NAME/AGE/GENDER: Ajit Samuel is a 76 y.o. female   PRIMARY DIAGNOSIS:  Stiffness of right knee [M25.661]   Procedure(s) and Anesthesia Type:     * MANIPULATION RIGHT KNEE/ FNB - Spinal (Right)  ICD-10: Treatment Diagnosis:    · Pain in Right Knee (M25.561)  · Stiffness of Right Knee, Not elsewhere classified (M25.661)  · Difficulty in walking, Not elsewhere classified (R26.2)      ASSESSMENT:     Ms. Fannie Wright presents up in chair on contact and agreeable and eager for therapy. Pt walked in mach[place] with rolling walker 650 feet with rolling walker and supervision. Back in room worked on some knee exercises encouraging flexion and stretching. Pt stayed up in chair with cryocuff in place and needs in reach. She has outpatient PT scheduled later today. This section established at most recent assessment   PROBLEM LIST (Impairments causing functional limitations):  1. Decreased Strength  2. Decreased ADL/Functional Activities  3. Decreased Transfer Abilities  4. Decreased Ambulation Ability/Technique  5. Decreased Balance  6. Increased Pain  7.  Decreased Activity Tolerance  8. Decreased Flexibility/Joint Mobility  9. Decreased [place] with Home Exercise Program   INTERVENTIONS PLANNED: (Benefits and precautions of physical therapy have been discussed with the patient.)  1. Bed Mobility  2. Gait Training  3. Home Exercise Program (HEP)  4. Therapeutic Exercise/Strengthening  5. Transfer Training  6. Range of Motion: active/assisted/passive  7. Therapeutic Activities  8. Group Therapy     TREATMENT PLAN: Frequency/Duration: Follow patient BID for duration of hospital stay to address above goals. Rehabilitation Potential For Stated Goals: Good     RECOMMENDED REHABILITATION/EQUIPMENT: (at time of discharge pending progress): Continue Skilled Therapy and Outpatient: Physical Therapy. HISTORY:   History of Present Injury/Illness (Reason for Referral): The patient has undergone right TKA and has residual stiffness in her knee with pain. The patient was evaluated and examined during a consultation prior to this hospitalization. There have been no changes to the patient's orthopedic condition since that consultation. The patient has failed to gain ROM The necessity for joint manipulation is present. The patient will be admitted the day of surgery for Procedure(s) (LRB):  MANIPULATION RIGHT KNEE/ FNB (Right)    Past Medical History/Comorbidities:   Ms. Morena Sanchez  has a past medical history of Environmental and seasonal allergies; GERD (gastroesophageal reflux disease); Neuropathy; Osteoporosis; Postoperative stiffness of total knee replacement ([facility] [address] (6/25/2018); Primary osteoarthritis of right knee; and Status post total right knee replacement (4/13/2018). Ms. Morena Sanchez  has a past surgical history that includes hx tonsillectomy (1962); hx colonoscopy (2012); hx tubal ligation (1978); and hx knee replacement (Right, 04/13/2018).   Social History/Living Environment:   Home Environment: Private residence  # Steps to Enter: 1  Rails to Enter: No  One/Two Story Residence: One story  Living Alone: No  Support Systems: Spouse/Significant Other/Partner  Patient Expects to be Discharged to[de-identified] Private residence  Current DME Used/Available at Home: Walker, rolling  Prior Level of Function/Work/Activity:  Pt was functioning without an assistive device prior to this admission   Number of Personal Factors/Comorbidities that affect the Plan of Care: 1-2: MODERATE COMPLEXITY   EXAMINATION:   Most Recent Physical Functioning:                RLE PROM  R Knee Flexion: 98           Bed Mobility  Supine to Sit:  (pt up in chair on contact)  Sit to Supine:  (stayed up in chair)    Transfers  Sit to Stand: Supervision  Stand to Sit: Supervision    Balance  Sitting: Intact  Standing: Intact; With support  Standing - Static: Good  Standing - Dynamic : Fair              Weight Bearing Status  Right Side Weight Bearing: As tolerated  Distance (ft): 650 Feet (ft)  Ambulation - Level of Assistance: Supervision  Assistive Device: Walker, rolling  Speed/Michela: Delayed  Step Length: Left shortened  Stance: Right decreased  Gait Abnormalities: Antalgic        Braces/Orthotics: none    Right Knee Cold  Type: Cryocuff      Body Structures Involved:  1. Joints  2. Muscles Body Functions Affected:  1. Sensory/Pain  2. Movement Related Activities and Participation Affected:  1. General Tasks and Demands  2. Mobility   Number of elements that affect the Plan of Care: 4+: HIGH COMPLEXITY   CLINICAL PRESENTATION:   Presentation: Stable and uncomplicated: LOW COMPLEXITY   CLINICAL DECISION MAKIN [facility] [address] [id number] AM-PAC 6 Clicks   Basic Mobility Inpatient Short Form  How much difficulty does the patient currently have. .. Unable A Lot A Little None   1. Turning over in bed (including adjusting bedclothes, sheets and blankets)? [] 1   [] 2   [x] 3   [] 4   2. Sitting down on and standing up from a chair with arms ( e.g., wheelchair, bedside commode, etc.)   [] 1   [] 2   [x] 3   [] 4   3.   Moving from lying on back to sitting on the side of the bed? [] 1   [] 2   [x] 3   [] 4   How much help from another person does the patient currently need. .. Total A Lot A Little None   4. Moving to and from a bed to a chair (including a wheelchair)? [] 1   [] 2   [x] 3   [] 4   5. Need to walk in hospital room? [] 1   [] 2   [x] 3   [] 4   6. Climbing 3-5 steps with a railing? [] 1   [] 2   [x] 3   [] 4   © 2007, Trustees of [address] [place] [address], under license to [employer]. All rights reserved        Score:  Initial: 16 Most Recent: 18 (Date: -- )    Interpretation of Tool:  Represents activities that are increasingly more difficult (i.e. Bed mobility, Transfers, Gait). Score 24 23 22-20 19-15 14-10 9-7 6     Modifier CH CI CJ CK CL CM CN      ? Mobility - Walking and Moving Around:     - CURRENT STATUS: CK - 40%-59% impaired, limited or restricted    - GOAL STATUS: CJ - 20%-39% impaired, limited or restricted    - D/C STATUS:  CK - 40%-59% impaired, limited or restricted  [place] / Product Type: Medicare /      Medical Necessity:     · Patient is expected to demonstrate progress in strength, range of motion, balance, coordination and functional technique to decrease assistance required with bed mobility, transfers, gait & right knee ROM. Reason for Services/Other Comments:  · Patient continues to require skilled intervention due to pt not functional with right knee mobility.    Use of outcome tool(s) and clinical judgement create a POC that gives a: Clear prediction of patient's progress: LOW COMPLEXITY            TREATMENT:   (In addition to Assessment/Re-Assessment sessions the following treatments were rendered)     Pre-treatment Symptoms/Complaints:  Stiffness right knee  Pain Initial: visual scale  Pain Intensity 1: 3  Post Session:  4/10     Therapeutic Exercise: (15 Minutes):  Exercises per grid below to improve mobility and dynamic movement of leg - right to improve functional endurance. Required minimal verbal cues to promote proper body alignment and promote proper body mechanics. Progressed range and repetitions as indicated. Therapeutic Activity: (    15): Therapeutic activities including Chair transfers, Toilet transfers and Ambulation on level ground to improve mobility, strength and coordination. Required minimal verbal cues   to  promote range of motion through knee during gait. Date:  6/25 Date:  6/26/18 Date:     ACTIVITY/EXERCISE AM PM AM PM AM PM   GROUP THERAPY  []  []  []  []  []  []   Ankle Pumps  25       Quad Sets  25 20-heels elevated on bolster      Gluteal Sets  25 20      Hip ABd/ADduction  25       Straight Leg Raises  25 20      Knee Slides  25 20-overpressure      Short Arc Quads  25 20      Long Arc Quads         Chair Slides  25 20                B = bilateral; AA = active assistive; A = active; P = passive      Treatment/Session Assessment:     Response to Treatment:  Tolerated only fair    Education:  [x] Home Exercises  [x] Fall Precautions   [] D/C Instruction Review  [] Knee Prosthesis Review  [x] Walker Management/Safety [] Adaptive Equipment as Needed       Interdisciplinary Collaboration:   o Registered Nurse    After treatment position/precautions:   o Up in chair  o Bed/Chair-wheels locked  o Call light within reach    Compliance with Program/Exercises: compliant all the time. Recommendations/Intent for next treatment session:  Treatment next visit will focus on increasing Ms. Stephens's independence with bed mobility, transfers, gait training, strength/ROM exercises, modalities for pain, and patient education.       Total Treatment Duration:  PT Patient Time In/Time Out  Time In: 0900  Time Out: Emiliana 3069, PT

## 2018-06-26 NOTE — PROGRESS NOTES
Patient placed on continuous sat monitor # 13 and  3 L NC HS per protocol. Monitor history deleted prior to placing on patient.  Patient working on IS

## 2018-06-26 NOTE — PROGRESS NOTES
Ambulatory/Rehab Services H2 Model Falls Risk Assessment    Risk Factor Pts. ·   Confusion/Disorientation/Impulsivity  []    4 ·   Symptomatic Depression  []   2 ·   Altered Elimination  []   1 ·   Dizziness/Vertigo  []   1 ·   Gender (Male)  []   1 ·   Any administered antiepileptics (anticonvulsants):  []   2 ·   Any administered benzodiazepines:  []   1 ·   Visual Impairment (specify):  []   1 ·   Portable Oxygen Use  []   1 ·   Orthostatic ? BP  []   1 ·   History of Recent Falls (within 3 mos.)  []   5     Ability to Rise from Chair (choose one) Pts. ·   Ability to rise in a single movement  []   0 ·   Pushes up, successful in one attempt  [x]   1 ·   Multiple attempts, but successful  []   3 ·   Unable to rise without assistance  []   4   Total: (5 or greater = High Risk) 1     Falls Prevention Plan:   []                Physical Limitations to Exercise (specify):   []                Mobility Assistance Device (type):   []                Exercise/Equipment Adaptation (specify):    ©2010 [facility] [place] [address] [place] [address] Patent #[id number].  Federal Law prohibits the replication, distribution or use without written permission from [facility] [employer]

## 2018-06-26 NOTE — PROGRESS NOTES
2018         Post Op day: 1 Day Post-Op Procedure(s) (LRB):  MANIPULATION RIGHT KNEE/ FNB (Right)      Admit Date: 2018  Admit Diagnosis: Stiffness of right knee [M25.661]       Principle Problem: Postoperative stiffness of total knee replacement ([address] Subjective: Doing well, No complaints, No SOB, No Chest Pain, No Nausea or Vomiting     Objective:   Vital Signs are Stable, No Acute Distress, Alert and Oriented, Dressing is Dry,  Neurovascular exam is normal.     Assessment / Plan :  Patient Active Problem List   Diagnosis Code    Snoring R06.83    Status post total right knee replacement Z96.651    Postoperative stiffness of total knee replacement (HCC) T84.89XA, M25.669, Z96.659    Status post surgical manipulation of knee joint Z98.890    Patient Vitals for the past 8 hrs:   BP Temp Pulse Resp SpO2   18 0318 128/74 97.9 °F (36.6 °C) 71 16 96 %   18 0015 121/76 98 °F (36.7 °C) 78 16 96 %    Temp (24hrs), Av.8 °F (36.6 °C), Min:96.6 °F (35.9 °C), Max:98.5 °F (36.9 °C)    Body mass index is 23.19 kg/(m^2).     Lab Results   Component Value Date/Time    HGB 9.9 (L) 2018 05:09 AM      Pt seen by and discussed with Supervising Physician   Continue PT  Dc home today       Signed By: ADRIANA Lugo  2018,  7:50 AM

## 2018-06-26 NOTE — DISCHARGE INSTRUCTIONS
Total Knee Replacement Rehabilitation  What is knee replacement rehabilitation? Knee replacement rehabilitation (rehab) is a series of exercises you do after your surgery. This helps you get back your knee's range of motion and strength. You will work with your doctor and physical therapist to plan this exercise program. To get the best results, you need to do the exercises correctly and as often and as long as your doctor or physical therapist tells you. Before you start any exercises, talk with your doctor or physical therapist. It is important that you know exactly how to do the exercises. Stop and call your doctor if you are not sure you are doing the exercises correctly or if you have any pain. Ice your knee after exercising if it is sore. Exercises after surgery  These exercises can be done right after your surgery. You can do them in your hospital bed. Talk to your doctor before you start any of these exercises. He or she may tell you to do them in a certain way. You may need to do them several times each day. When you first do them, they may hurt. Your knee may click or pop. But these exercises will help you recover and may help your pain go away faster. Quad sets  · Lie or sit on your bed. · Tighten the thigh muscle of your affected leg. · Hold for about 6 seconds, then rest up to 10 seconds. · Relax your thigh and knee. · Do 8 to 10 repetitions several times during the day. Straight leg raises  · Lie or sit on your bed. · Tighten the thigh muscle of your affected leg, and keep the knee straight. · Lift your leg several inches, and hold it for 5 to 10 seconds. · Slowly lower your leg. · Rest a minute. Repeat 8 to 12 times with each knee. Do this exercise every day, up to 3 times a day. Ankle pumps  · Lie or sit on your bed. · Tighten the thigh and calf muscles of your affected leg. This will make your foot move up and down.   · Do this for 2 to 3 minutes, 2 to 3 times an hour.  Quadriceps (thigh) strengthening exercise   · While sitting in a chair, straighten your leg and hold for 6 seconds. Then lower your leg and rest for up to 10 seconds. · Repeat 8 to 12 times with each leg. Do every day, up to 3 times a day. · When this thigh-strengthening exercise becomes easy, you can add a light weight to your ankle. Bed knee bends  · Lie or sit on your bed. · Bend your affected knee by sliding your foot toward you. Stop when your knee no longer bends. · Hold this position for 15 to 30 seconds, and then slide your leg back down the bed. · Do this several times. Later exercises  When you are able to walk on your own for a few steps or a short distance, you can try these exercises. Talk to your doctor before starting any of these exercises. He or she may tell you to do them in a certain way. Standing knee bends  · Stand up straight, using a walker or crutches or hold onto something stable, such as a counter. · Lift the thigh of your affected leg so that your knee bends. · Lift as far as you can, and hold it for 5 to 10 seconds. · Lower your leg, and touch the floor with your heel first.  · Do this 8 to 12 times or until your leg feels tired. Assisted knee bends  · Lie on your back with your affected knee slightly bent. · Loop a towel over your knee, and slide it down so that is against your ankle. · Continue to bend your knee, pulling your ankle gently toward you with the towel. · Bend your knee as far as you can. · Hold for 15 to 30 seconds, and then relax your leg. · Do this 10 to 12 times or until your leg feels tired. Adding resistance  About 4 to 6 weeks after surgery, you may be able to do the standing or assisted knee bends with light weights around your ankles. Begin with 1- to 2-pound weights, and slowly increase the weight as your knee and leg get stronger. Check with your doctor before you make any changes to your exercises.   Follow-up care is a key part of your treatment and safety. Be sure to make and go to all appointments, and call your doctor if you are having problems. It's also a good idea to know your test results and keep a list of the medicines you take. Where can you learn more? Go to http://siobhan-lynne.info/. Enter Z185 in the search box to learn more about \"Total Knee Replacement Rehabilitation. \"  Current as of: March 21, 2017  Content Version: 11.4  © 9828-8682 [employer]. Care instructions adapted under license by [employer] (which disclaims liability or warranty for this information). If you have questions about a medical condition or this instruction, always ask your healthcare professional. [place] [address] any warranty or liability for your use of this information. DISCHARGE SUMMARY from Nurse    PATIENT INSTRUCTIONS:    After general anesthesia or intravenous sedation, for 24 hours or while taking prescription Narcotics:  · Limit your activities  · Do not drive and operate hazardous machinery  · Do not make important personal or business decisions  · Do  not drink alcoholic beverages  · If you have not urinated within 8 hours after discharge, please contact your surgeon on call. Report the following to your surgeon:  · Excessive pain, swelling, redness or odor of or around the surgical area  · Temperature over 100.5  · Nausea and vomiting lasting longer than 4 hours or if unable to take medications  · Any signs of decreased circulation or nerve impairment to extremity: change in color, persistent  numbness, tingling, coldness or increase pain  · Any questions    What to do at Home:  Recommended activity: Activity as tolerated,     If you experience any of the following symptoms , please follow up with Dr. Colletta Plume. *  Please give a list of your current medications to your Primary Care Provider.     *  Please update this list whenever your medications are discontinued, doses are changed, or new medications (including over-the-counter products) are added. *  Please carry medication information at all times in case of emergency situations. These are general instructions for a healthy lifestyle:    No smoking/ No tobacco products/ Avoid exposure to second hand smoke  Surgeon General's Warning:  Quitting smoking now greatly reduces serious risk to your health. Obesity, smoking, and sedentary lifestyle greatly increases your risk for illness    A healthy diet, regular physical exercise & weight monitoring are important for maintaining a healthy lifestyle    You may be retaining fluid if you have a history of heart failure or if you experience any of the following symptoms:  Weight gain of 3 pounds or more overnight or 5 pounds in a week, increased swelling in our hands or feet or shortness of breath while lying flat in bed. Please call your doctor as soon as you notice any of these symptoms; do not wait until your next office visit. Recognize signs and symptoms of STROKE:    F-face looks uneven    A-arms unable to move or move unevenly    S-speech slurred or non-existent    T-time-call 911 as soon as signs and symptoms begin-DO NOT go       Back to bed or wait to see if you get better-TIME IS BRAIN. Warning Signs of HEART ATTACK     Call 911 if you have these symptoms:   Chest discomfort. Most heart attacks involve discomfort in the center of the chest that lasts more than a few minutes, or that goes away and comes back. It can feel like uncomfortable pressure, squeezing, fullness, or pain.  Discomfort in other areas of the upper body. Symptoms can include pain or discomfort in one or both arms, the back, neck, jaw, or stomach.  Shortness of breath with or without chest discomfort.  Other signs may include breaking out in a cold sweat, nausea, or lightheadedness. Don't wait more than five minutes to call 911 - MINUTES MATTER! Fast action can save your life.  Calling 911 is almost always the fastest way to get lifesaving treatment. Emergency Medical Services staff can begin treatment when they arrive -- up to an hour sooner than if someone gets to the hospital by car. The discharge information has been reviewed with the patient. The patient verbalized understanding. Discharge medications reviewed with the patient and appropriate educational materials and side effects teaching were provided. ___________________________________________________________________________________________________________________________________[facility]   Patient Discharge Instructions    Gabriela Tee / [id number] : 1950    Admitted 2018 Discharged: 2018         IF YOU HAVE ANY PROBLEMS ONCE YOU ARE AT HOME CALL THE FOLLOWING NUMBERS:   Main office number: [phone]  Medications    · The medications you are to continue on are listed on the medication reconciliation sheet. · It is important that you take the medication exactly as they are prescribed. · Medications which increase your risk of blood clots are listed to stop for 5 weeks after surgery as well as medications or supplements which increase your risk of bleeding complications. · Keep your medication in the bottles provided by the pharmacist and keep a list of the medication names, dosages, and times to be taken in your wallet. · Do not take other medications without consulting your doctor. What to do at [address] [place] [address] Ave your prehospital diet. If you have excessive nausea or vomitting call your doctor's office     Continue Physical Therapy Exercises    Do not drive if taking narcotic pain medication       When to Call    - Call if you have a temperature greater then 101  - Are unable to bear any wieght   - Need a pain medication refill   - The dressing becomes saturated     Information obtained by :  I understand that if any problems occur once I am at home I am to contact my physician.     I understand and acknowledge receipt of the instructions indicated above.                                                                                                                                            Physician's or R.N.'s Signature                                                                  Date/Time                                                                                                                                              Patient or Representative Signature                                                          Date/Time

## 2018-06-29 ENCOUNTER — HOSPITAL ENCOUNTER (OUTPATIENT)
Dept: PHYSICAL THERAPY | Age: 68
Discharge: HOME | End: 2018-06-29
Payer: MEDICARE

## 2018-06-29 PROCEDURE — 97110 THERAPEUTIC EXERCISES: CPT

## 2018-06-29 PROCEDURE — 97140 MANUAL THERAPY 1/> REGIONS: CPT

## 2018-07-02 ENCOUNTER — HOSPITAL ENCOUNTER (OUTPATIENT)
Dept: PHYSICAL THERAPY | Age: 68
Discharge: HOME | End: 2018-07-02
Payer: MEDICARE

## 2018-07-02 PROCEDURE — 97140 MANUAL THERAPY 1/> REGIONS: CPT

## 2018-07-02 PROCEDURE — 97110 THERAPEUTIC EXERCISES: CPT

## 2018-07-02 NOTE — THERAPY EVALUATION
Maria De Jesus Abdalla  : 1950  [place] / [place] / Product Type: Medicare /  [address]  at [facility] Therapy  [address] [place] [address], [facility] [address]  [phone]   [phone]         OUTPATIENT PHYSICAL THERAPY:Initial Assessment 2018    ICD-10: Treatment Diagnosis:   M25.561 Pain in right knee       M25.661 Stiffness of right knee, not elsewhere classified       R26.2 Difficulty in walking, not elsewhere classified        Precautions/Allergies:   Review of patient's allergies indicates no known allergies. Fall Risk Score: 1 (? 5 = High Risk)  MD Orders: Eval and treat  MEDICAL/REFERRING DIAGNOSIS:  Presence of right artificial knee joint [Z96.651]   DATE OF ONSET: 2018  REFERRING PHYSICIAN: Cari Harvey., *  RETURN PHYSICIAN APPOINTMENT: 4 weeks     INITIAL ASSESSMENT:  Ms. Wily Parra presents     PROBLEM LIST (Impacting functional limitations):  1. Decreased Strength  2. Decreased ADL/Functional Activities  3. Decreased Ambulation Ability/Technique  4. Increased Pain  5. Decreased Flexibility/Joint Mobility  6. Edema/Girth INTERVENTIONS PLANNED:  1. Gait Training  2. Home Exercise Program (HEP)  3. Manual Therapy  4. Range of Motion (ROM)  5. Therapeutic Activites  6. Therapeutic Exercise/Strengthening   TREATMENT PLAN:  Effective Dates: 2018 TO 2018 (90 days). Frequency/Duration: 2 times a week for 90and upon reassessment, will adjust frequency and duration as progress indicates. GOALS: (Goals have been discussed and agreed upon with patient.)  Short-Term Functional Goals: Time Frame: 4 weeks   1. Establish independent HEP with no cueing. Discharge Goals: Time Frame: 12 weeks  1. Improve score on  Rehabilitation Potential For Stated Goals: Good  Regarding Yina donovan, I certify that the treatment plan above will be carried out by a therapist or under their direction.   Thank you for this referral,  Joanne Blunt, KAITLYN     Referring Physician Signature: Vinicio Chico., *              Date                    The information in this section was collected on 6/26/18(except where otherwise noted). HISTORY:   History of Present Injury/Illness (Reason for Referral):    Past Medical History/Comorbidities:   Ms. Jl Torres  has a past medical history of Environmental and seasonal allergies; GERD (gastroesophageal reflux disease); Neuropathy; Osteoporosis; Postoperative stiffness of total knee replacement ([facility] [address] (6/25/2018); Primary osteoarthritis of right knee; and Status post total right knee replacement (4/13/2018). Ms. Jl Torres  has a past surgical history that includes hx tonsillectomy (1962); hx colonoscopy (2012); hx tubal ligation (1978); and hx knee replacement (Right, 04/13/2018). Social History/Living Environment:     Lives with spouse   Prior Level of Function/Work/Activity:  Retired but works part time cleaning homes   Dominant Side:         RIGHT  Current Medications:       Current Outpatient Prescriptions:     HYDROmorphone (DILAUDID) 2 mg tablet, Take 1 Tab by mouth every four (4) hours as needed. Max Daily Amount: 12 mg., Disp: 40 Tab, Rfl: 0    aspirin delayed-release 81 mg tablet, Take 81 mg by mouth daily. , Disp: , Rfl:     calcium-vitamin D (OYSTER SHELL) 500 mg(1,250mg) -200 unit per tablet, Take 1 Tab by mouth daily. , Disp: , Rfl:    Date Last Reviewed:  7/2/2018   Number of Personal Factors/Comorbidities that affect the Plan of Care: 1-2: MODERATE COMPLEXITY   EXAMINATION:   Observation/Orthostatic Postural Assessment:            Palpation:            Functional Mobility:         Gait/Ambulation:  Independent with cane        Stairs:  Difficulty with stairs   ROM:                                            Strength:                          Special Tests:   Neurological Screen:   Balance: Body Structures Involved:  1. Bones  2. Joints  3. Muscles  4.  Ligaments Body Functions Affected:  1. Sensory/Pain  2. Neuromusculoskeletal  3. Movement Related Activities and Participation Affected:  1. Learning and Applying Knowledge  2. General Tasks and Demands  3. Mobility  4. Domestic Life  5. Community, Social and [place]   Number of elements (examined above) that affect the Plan of Care: 3: MODERATE COMPLEXITY   CLINICAL PRESENTATION:   Presentation: Evolving clinical presentation with changing clinical characteristics: MODERATE COMPLEXITY   CLINICAL DECISION MAKING:   Outcome Measure: Tool Used: Lower Extremity Functional Scale (LEFS)  Score:  Initial: 33/80 Most Recent: X/80 (Date: -- )   Interpretation of Score: 20 questions each scored on a 5 point scale with 0 representing \"extreme difficulty or unable to perform\" and 4 representing \"no difficulty\". The lower the score, the greater the functional disability. 80/80 represents no disability. Minimal detectable change is 9 points. Score 80 79-63 62-48 47-32 31-16 15-1 0   Modifier CH CI CJ CK CL CM CN     ? Mobility - Walking and Moving Around:     - CURRENT STATUS: CK - 40%-59% impaired, limited or restricted    - GOAL STATUS: CJ - 20%-39% impaired, limited or restricted    - D/C STATUS:  ---------------To be determined---------------    Medical Necessity:   · Patient is expected to demonstrate progress in strength, range of motion and gait to increase independence with home and community activities. Reason for Services/Other Comments:  · Patient continues to require skilled intervention due to decreased ROM and strength of R knee and LE. Use of outcome tool(s) and clinical judgement create a POC that gives a: Clear prediction of patient's progress: LOW COMPLEXITY            TREATMENT:   (In addition to Assessment/Re-Assessment sessions the following treatments were rendered)  Pre-treatment Symptoms/Complaints:  Pain and stiffness of R knee. Weakness of LE's.   Fair gait   Pain: Initial:   Pain Intensity 1: 4  Post Session:  2     THERAPEUTIC EXERCISE: (0 minutes):  Exercises per grid below to improve mobility, strength and gait. Required moderate verbal and manual cues to promote proper body alignment, promote proper body posture and promote proper body mechanics. Progressed resistance, range and repetitions as indicated. Evaluation (  xx   ):    Manual Therapy (      ): Patella and patella tendon mobs. Soft tissue work to [employer] and HS. PROM for knee flex and extn. Therapeutic Modalities:    HEP: As above; handouts given to patient for all exercises. [employer] Portal    Treatment/Session Assessment:    · Response to Treatment:  . · Compliance with Program/Exercises: Will assess as treatment progresses. · Recommendations/Intent for next treatment session: \"Next visit will focus on aggressive ROM and strengthening of R knee and LE\".   Total Treatment Duration:  PT Patient Time In/Time Out  Time In: 0200  Time Out: 0300  Treatment number  3  Kellee Saba PT

## 2018-07-06 ENCOUNTER — HOSPITAL ENCOUNTER (OUTPATIENT)
Dept: PHYSICAL THERAPY | Age: 68
Discharge: HOME | End: 2018-07-06
Payer: MEDICARE

## 2018-07-06 PROCEDURE — 97110 THERAPEUTIC EXERCISES: CPT

## 2018-07-06 PROCEDURE — 97140 MANUAL THERAPY 1/> REGIONS: CPT

## 2018-07-09 ENCOUNTER — HOSPITAL ENCOUNTER (OUTPATIENT)
Dept: PHYSICAL THERAPY | Age: 68
Discharge: HOME | End: 2018-07-09
Payer: MEDICARE

## 2018-07-09 PROCEDURE — 97110 THERAPEUTIC EXERCISES: CPT

## 2018-07-09 PROCEDURE — 97140 MANUAL THERAPY 1/> REGIONS: CPT

## 2018-07-10 NOTE — PROGRESS NOTES
Bart Randolph  : 1950  [place] / Product Type: Medicare /  [address]  at [facility]  [address], [address]  [phone]   [phone]         OUTPATIENT PHYSICAL THERAPY:Initial Assessment 2018    ICD-10: Treatment Diagnosis:   M25.561 Pain in right knee       M25.661 Stiffness of right knee, not elsewhere classified       R26.2 Difficulty in walking, not elsewhere classified        Precautions/Allergies:   Review of patient's allergies indicates no known allergies. Fall Risk Score: 1 (? 5 = High Risk)  MD Orders: Eval and treat  MEDICAL/REFERRING DIAGNOSIS:  Presence of right artificial knee joint [Z96.651]   DATE OF ONSET: 2018  REFERRING PHYSICIAN: Supriya Alvarado., *  RETURN PHYSICIAN APPOINTMENT: 4 weeks     INITIAL ASSESSMENT:  Ms. Chintan Senior presents     PROBLEM LIST (Impacting functional limitations):  1. Decreased Strength  2. Decreased ADL/Functional Activities  3. Decreased Ambulation Ability/Technique  4. Increased Pain  5. Decreased Flexibility/Joint Mobility  6. Edema/Girth INTERVENTIONS PLANNED:  1. Gait Training  2. Home Exercise Program (HEP)  3. Manual Therapy  4. Range of Motion (ROM)  5. Therapeutic Activites  6. Therapeutic Exercise/Strengthening   TREATMENT PLAN:  Effective Dates: 2018 TO 2018 (90 days). Frequency/Duration: 2 times a week for 90and upon reassessment, will adjust frequency and duration as progress indicates. GOALS: (Goals have been discussed and agreed upon with patient.)  Short-Term Functional Goals: Time Frame: 4 weeks   1. Establish independent HEP with no cueing. Discharge Goals: Time Frame: 12 weeks  1. Improve score on  Rehabilitation Potential For Stated Goals: Good  Regarding Maddy Garnica therapy, I certify that the treatment plan above will be carried out by a therapist or under their direction.   Thank you for this referral,  Radha Marquez PT     Referring Physician Signature: Eusebiaelli Villela., *              Date                    The information in this section was collected on 6/26/18(except where otherwise noted). HISTORY:   History of Present Injury/Illness (Reason for Referral):    Past Medical History/Comorbidities:   Ms. Rubina Anglin  has a past medical history of Environmental and seasonal allergies; GERD (gastroesophageal reflux disease); Neuropathy; Osteoporosis; Postoperative stiffness of total knee replacement ([facility] [address] (6/25/2018); Primary osteoarthritis of right knee; and Status post total right knee replacement (4/13/2018). Ms. Rubina Anglin  has a past surgical history that includes hx tonsillectomy (1962); hx colonoscopy (2012); hx tubal ligation (1978); and hx knee replacement (Right, 04/13/2018). Social History/Living Environment:     Lives with spouse   Prior Level of Function/Work/Activity:  Retired but works part time cleaning homes   Dominant Side:         RIGHT  Current Medications:       Current Outpatient Prescriptions:     HYDROmorphone (DILAUDID) 2 mg tablet, Take 1 Tab by mouth every four (4) hours as needed. Max Daily Amount: 12 mg., Disp: 40 Tab, Rfl: 0    aspirin delayed-release 81 mg tablet, Take 81 mg by mouth daily. , Disp: , Rfl:     calcium-vitamin D (OYSTER SHELL) 500 mg(1,250mg) -200 unit per tablet, Take 1 Tab by mouth daily. , Disp: , Rfl:    Date Last Reviewed:  7/9/2018   Number of Personal Factors/Comorbidities that affect the Plan of Care: 1-2: MODERATE COMPLEXITY   EXAMINATION:   Observation/Orthostatic Postural Assessment:            Palpation:            Functional Mobility:         Gait/Ambulation:  Independent with cane        Stairs:  Difficulty with stairs   ROM:                                            Strength:                          Special Tests:   Neurological Screen:   Balance: Body Structures Involved:  1. Bones  2. Joints  3. Muscles  4.  Ligaments Body Functions Affected:  1. Sensory/Pain  2. Neuromusculoskeletal  3. Movement Related Activities and Participation Affected:  1. Learning and Applying Knowledge  2. General Tasks and Demands  3. Mobility  4. Domestic Life  5. Community, Social and [place]   Number of elements (examined above) that affect the Plan of Care: 3: MODERATE COMPLEXITY   CLINICAL PRESENTATION:   Presentation: Evolving clinical presentation with changing clinical characteristics: MODERATE COMPLEXITY   CLINICAL DECISION MAKING:   Outcome Measure: Tool Used: Lower Extremity Functional Scale (LEFS)  Score:  Initial: 33/80 Most Recent: X/80 (Date: -- )   Interpretation of Score: 20 questions each scored on a 5 point scale with 0 representing \"extreme difficulty or unable to perform\" and 4 representing \"no difficulty\". The lower the score, the greater the functional disability. 80/80 represents no disability. Minimal detectable change is 9 points. Score 80 79-63 62-48 47-32 31-16 15-1 0   Modifier CH CI CJ CK CL CM CN     ? Mobility - Walking and Moving Around:     - CURRENT STATUS: CK - 40%-59% impaired, limited or restricted    - GOAL STATUS: CJ - 20%-39% impaired, limited or restricted    - D/C STATUS:  ---------------To be determined---------------    Medical Necessity:   · Patient is expected to demonstrate progress in strength, range of motion and gait to increase independence with home and community activities. Reason for Services/Other Comments:  · Patient continues to require skilled intervention due to decreased ROM and strength of R knee and LE. Use of outcome tool(s) and clinical judgement create a POC that gives a: Clear prediction of patient's progress: LOW COMPLEXITY            TREATMENT:   (In addition to Assessment/Re-Assessment sessions the following treatments were rendered)  Pre-treatment Symptoms/Complaints:  Pain and stiffness of R knee. Weakness of LE's.   Fair gait   Pain: Initial:   Pain Intensity 1: 4  Post Session:  2     THERAPEUTIC EXERCISE: (0 minutes):  Exercises per grid below to improve mobility, strength and gait. Required moderate verbal and manual cues to promote proper body alignment, promote proper body posture and promote proper body mechanics. Progressed resistance, range and repetitions as indicated. Evaluation (  xx   ):    Manual Therapy (      ): Patella and patella tendon mobs. Soft tissue work to [employer] and HS. PROM for knee flex and extn. Therapeutic Modalities:    HEP: As above; handouts given to patient for all exercises. [employer]    Treatment/Session Assessment:    · Response to Treatment:  . · Compliance with Program/Exercises: Will assess as treatment progresses. · Recommendations/Intent for next treatment session: \"Next visit will focus on aggressive ROM and strengthening of R knee and LE\".   Total Treatment Duration:  PT Patient Time In/Time Out  Time In: 1100  Time Out: 1200  Treatment number  [address], PT

## 2018-07-12 ENCOUNTER — HOSPITAL ENCOUNTER (OUTPATIENT)
Dept: PHYSICAL THERAPY | Age: 68
Discharge: HOME | End: 2018-07-12
Payer: MEDICARE

## 2018-07-12 PROCEDURE — 97110 THERAPEUTIC EXERCISES: CPT

## 2018-07-12 PROCEDURE — 97140 MANUAL THERAPY 1/> REGIONS: CPT

## 2018-07-12 NOTE — PROGRESS NOTES
Justo Watkins  : 1950  [place] / Product Type: Medicare /  [address]  at [facility] Therapy  [address], [address]  [phone]   [phone]         OUTPATIENT PHYSICAL THERAPY:Initial Assessment 2018    ICD-10: Treatment Diagnosis:   M25.561 Pain in right knee       M25.661 Stiffness of right knee, not elsewhere classified       R26.2 Difficulty in walking, not elsewhere classified        Precautions/Allergies:   Review of patient's allergies indicates no known allergies. Fall Risk Score: 1 (? 5 = High Risk)  MD Orders: Eval and treat  MEDICAL/REFERRING DIAGNOSIS:  Presence of right artificial knee joint [Z96.651]   DATE OF ONSET: 2018  REFERRING PHYSICIAN: Angel Salinas., *  RETURN PHYSICIAN APPOINTMENT: 4 weeks     INITIAL ASSESSMENT:  Ms. Dolly George presents     PROBLEM LIST (Impacting functional limitations):  1. Decreased Strength  2. Decreased ADL/Functional Activities  3. Decreased Ambulation Ability/Technique  4. Increased Pain  5. Decreased Flexibility/Joint Mobility  6. Edema/Girth INTERVENTIONS PLANNED:  1. Gait Training  2. Home Exercise Program (HEP)  3. Manual Therapy  4. Range of Motion (ROM)  5. Therapeutic Activites  6. Therapeutic Exercise/Strengthening   TREATMENT PLAN:  Effective Dates: 2018 TO 2018 (90 days). Frequency/Duration: 2 times a week for 90and upon reassessment, will adjust frequency and duration as progress indicates. GOALS: (Goals have been discussed and agreed upon with patient.)  Short-Term Functional Goals: Time Frame: 4 weeks   1. Establish independent HEP with no cueing. Discharge Goals: Time Frame: 12 weeks  1. Improve score on  Rehabilitation Potential For Stated Goals: Good  Regarding [facility] therapy, I certify that the treatment plan above will be carried out by a therapist or under their direction.   Thank you for this referral,  Debo Humphrey PT     Referring Physician Signature: Samuel Hardy., *              Date                    The information in this section was collected on 6/26/18(except where otherwise noted). HISTORY:   History of Present Injury/Illness (Reason for Referral):    Past Medical History/Comorbidities:   Ms. Bernie Vides  has a past medical history of Environmental and seasonal allergies; GERD (gastroesophageal reflux disease); Neuropathy; Osteoporosis; Postoperative stiffness of total knee replacement ([facility] [address] (6/25/2018); Primary osteoarthritis of right knee; and Status post total right knee replacement (4/13/2018). Ms. Bernie Vides  has a past surgical history that includes hx tonsillectomy (1962); hx colonoscopy (2012); hx tubal ligation (1978); and hx knee replacement (Right, 04/13/2018). Social History/Living Environment:     Lives with spouse   Prior Level of Function/Work/Activity:  Retired but works part time cleaning homes   Dominant Side:         RIGHT  Current Medications:       Current Outpatient Prescriptions:     HYDROmorphone (DILAUDID) 2 mg tablet, Take 1 Tab by mouth every four (4) hours as needed. Max Daily Amount: 12 mg., Disp: 40 Tab, Rfl: 0    aspirin delayed-release 81 mg tablet, Take 81 mg by mouth daily. , Disp: , Rfl:     calcium-vitamin D (OYSTER SHELL) 500 mg(1,250mg) -200 unit per tablet, Take 1 Tab by mouth daily. , Disp: , Rfl:    Date Last Reviewed:  7/12/2018   Number of Personal Factors/Comorbidities that affect the Plan of Care: 1-2: MODERATE COMPLEXITY   EXAMINATION:   Observation/Orthostatic Postural Assessment:            Palpation:            Functional Mobility:         Gait/Ambulation:  Independent with cane        Stairs:  Difficulty with stairs   ROM:                                            Strength:                          Special Tests:   Neurological Screen:   Balance: Body Structures Involved:  1. Bones  2. Joints  3. Muscles  4.  Ligaments Body Functions Affected:  1. Sensory/Pain  2. Neuromusculoskeletal  3. Movement Related Activities and Participation Affected:  1. Learning and Applying Knowledge  2. General Tasks and Demands  3. Mobility  4. Domestic Life  5. Community, Social and [place]   Number of elements (examined above) that affect the Plan of Care: 3: MODERATE COMPLEXITY   CLINICAL PRESENTATION:   Presentation: Evolving clinical presentation with changing clinical characteristics: MODERATE COMPLEXITY   CLINICAL DECISION MAKING:   Outcome Measure: Tool Used: Lower Extremity Functional Scale (LEFS)  Score:  Initial: 33/80 Most Recent: X/80 (Date: -- )   Interpretation of Score: 20 questions each scored on a 5 point scale with 0 representing \"extreme difficulty or unable to perform\" and 4 representing \"no difficulty\". The lower the score, the greater the functional disability. 80/80 represents no disability. Minimal detectable change is 9 points. Score 80 79-63 62-48 47-32 31-16 15-1 0   Modifier CH CI CJ CK CL CM CN     ? Mobility - Walking and Moving Around:     - CURRENT STATUS: CK - 40%-59% impaired, limited or restricted    - GOAL STATUS: CJ - 20%-39% impaired, limited or restricted    - D/C STATUS:  ---------------To be determined---------------    Medical Necessity:   · Patient is expected to demonstrate progress in strength, range of motion and gait to increase independence with home and community activities. Reason for Services/Other Comments:  · Patient continues to require skilled intervention due to decreased ROM and strength of R knee and LE. Use of outcome tool(s) and clinical judgement create a POC that gives a: Clear prediction of patient's progress: LOW COMPLEXITY            TREATMENT:   (In addition to Assessment/Re-Assessment sessions the following treatments were rendered)  Pre-treatment Symptoms/Complaints:  Pain and stiffness of R knee. Weakness of LE's.   Fair gait   Pain: Initial:   Pain Intensity 1: 3  Post Session:  2     THERAPEUTIC EXERCISE: (0 minutes):  Exercises per grid below to improve mobility, strength and gait. Required moderate verbal and manual cues to promote proper body alignment, promote proper body posture and promote proper body mechanics. Progressed resistance, range and repetitions as indicated. Evaluation (  xx   ):    Manual Therapy (      ): Patella and patella tendon mobs. Soft tissue work to [employer] and HS. PROM for knee flex and extn. Therapeutic Modalities:    HEP: As above; handouts given to patient for all exercises. [employer] Portal    Treatment/Session Assessment:    · Response to Treatment:  . · Compliance with Program/Exercises: Will assess as treatment progresses. · Recommendations/Intent for next treatment session: \"Next visit will focus on aggressive ROM and strengthening of R knee and LE\".   Total Treatment Duration:  PT Patient Time In/Time Out  Time In: 0200  Time Out: 0300  Treatment number  [id number],

## 2018-07-16 ENCOUNTER — HOSPITAL ENCOUNTER (OUTPATIENT)
Dept: PHYSICAL THERAPY | Age: 68
Discharge: HOME | End: 2018-07-16
Payer: MEDICARE

## 2018-07-16 PROCEDURE — 97110 THERAPEUTIC EXERCISES: CPT

## 2018-07-16 PROCEDURE — 97140 MANUAL THERAPY 1/> REGIONS: CPT

## 2018-07-16 NOTE — PROGRESS NOTES
Rosa Garrison  : 1950  [place] / Product Type: Medicare /  [address]  at [place] [address] Therapy  [address], [address]  [phone]   [phone]         OUTPATIENT PHYSICAL THERAPY:Initial Assessment 2018    ICD-10: Treatment Diagnosis:   M25.561 Pain in right knee       M25.661 Stiffness of right knee, not elsewhere classified       R26.2 Difficulty in walking, not elsewhere classified        Precautions/Allergies:   Review of patient's allergies indicates no known allergies. Fall Risk Score: 1 (? 5 = High Risk)  MD Orders: Eval and treat  MEDICAL/REFERRING DIAGNOSIS:  Presence of right artificial knee joint [Z96.651]   DATE OF ONSET: 2018  REFERRING PHYSICIAN: Kathy Martins., *  RETURN PHYSICIAN APPOINTMENT: 4 weeks     INITIAL ASSESSMENT:  Ms. Alvin Alarcon presents     PROBLEM LIST (Impacting functional limitations):  1. Decreased Strength  2. Decreased ADL/Functional Activities  3. Decreased Ambulation Ability/Technique  4. Increased Pain  5. Decreased Flexibility/Joint Mobility  6. Edema/Girth INTERVENTIONS PLANNED:  1. Gait Training  2. Home Exercise Program (HEP)  3. Manual Therapy  4. Range of Motion (ROM)  5. Therapeutic Activites  6. Therapeutic Exercise/Strengthening   TREATMENT PLAN:  Effective Dates: 2018 TO 2018 (90 days). Frequency/Duration: 2 times a week for 90and upon reassessment, will adjust frequency and duration as progress indicates. GOALS: (Goals have been discussed and agreed upon with patient.)  Short-Term Functional Goals: Time Frame: 4 weeks   1. Establish independent HEP with no cueing. Discharge Goals: Time Frame: 12 weeks  1. Improve score on  Rehabilitation Potential For Stated Goals: Good  Regarding [facility] therapy, I certify that the treatment plan above will be carried out by a therapist or under their direction.   Thank you for this referral,  Vidhi Worthington PT     Referring Physician Signature: Dave Camilo., *              Date                    The information in this section was collected on 6/26/18(except where otherwise noted). HISTORY:   History of Present Injury/Illness (Reason for Referral):    Past Medical History/Comorbidities:   Ms. Dorinda Kitchen  has a past medical history of Environmental and seasonal allergies; GERD (gastroesophageal reflux disease); Neuropathy; Osteoporosis; Postoperative stiffness of total knee replacement ([facility] [address] (6/25/2018); Primary osteoarthritis of right knee; and Status post total right knee replacement (4/13/2018). Ms. Dorinda Kitchen  has a past surgical history that includes hx tonsillectomy (1962); hx colonoscopy (2012); hx tubal ligation (1978); and hx knee replacement (Right, 04/13/2018). Social History/Living Environment:     Lives with spouse   Prior Level of Function/Work/Activity:  Retired but works part time cleaning homes   Dominant Side:         RIGHT  Current Medications:       Current Outpatient Prescriptions:     HYDROmorphone (DILAUDID) 2 mg tablet, Take 1 Tab by mouth every four (4) hours as needed. Max Daily Amount: 12 mg., Disp: 40 Tab, Rfl: 0    aspirin delayed-release 81 mg tablet, Take 81 mg by mouth daily. , Disp: , Rfl:     calcium-vitamin D (OYSTER SHELL) 500 mg(1,250mg) -200 unit per tablet, Take 1 Tab by mouth daily. , Disp: , Rfl:    Date Last Reviewed:  7/16/2018   Number of Personal Factors/Comorbidities that affect the Plan of Care: 1-2: MODERATE COMPLEXITY   EXAMINATION:   Observation/Orthostatic Postural Assessment:            Palpation:            Functional Mobility:         Gait/Ambulation:  Independent with cane        Stairs:  Difficulty with stairs   ROM:                                            Strength:                          Special Tests:   Neurological Screen:   Balance: Body Structures Involved:  1. Bones  2. Joints  3. Muscles  4.  Ligaments Body Functions Affected:  1. Sensory/Pain  2. Neuromusculoskeletal  3. Movement Related Activities and Participation Affected:  1. Learning and Applying Knowledge  2. General Tasks and Demands  3. Mobility  4. Domestic Life  5. Community, Social and [place]   Number of elements (examined above) that affect the Plan of Care: 3: MODERATE COMPLEXITY   CLINICAL PRESENTATION:   Presentation: Evolving clinical presentation with changing clinical characteristics: MODERATE COMPLEXITY   CLINICAL DECISION MAKING:   Outcome Measure: Tool Used: Lower Extremity Functional Scale (LEFS)  Score:  Initial: 33/80 Most Recent: X/80 (Date: -- )   Interpretation of Score: 20 questions each scored on a 5 point scale with 0 representing \"extreme difficulty or unable to perform\" and 4 representing \"no difficulty\". The lower the score, the greater the functional disability. 80/80 represents no disability. Minimal detectable change is 9 points. Score 80 79-63 62-48 47-32 31-16 15-1 0   Modifier CH CI CJ CK CL CM CN     ? Mobility - Walking and Moving Around:     - CURRENT STATUS: CK - 40%-59% impaired, limited or restricted    - GOAL STATUS: CJ - 20%-39% impaired, limited or restricted    - D/C STATUS:  ---------------To be determined---------------    Medical Necessity:   · Patient is expected to demonstrate progress in strength, range of motion and gait to increase independence with home and community activities. Reason for Services/Other Comments:  · Patient continues to require skilled intervention due to decreased ROM and strength of R knee and LE. Use of outcome tool(s) and clinical judgement create a POC that gives a: Clear prediction of patient's progress: LOW COMPLEXITY            TREATMENT:   (In addition to Assessment/Re-Assessment sessions the following treatments were rendered)  Pre-treatment Symptoms/Complaints:  Pain and stiffness of R knee. Weakness of LE's.   Fair gait   Pain: Initial:   Pain Intensity 1: 3  Post Session:  2     THERAPEUTIC EXERCISE: (0 minutes):  Exercises per grid below to improve mobility, strength and gait. Required moderate verbal and manual cues to promote proper body alignment, promote proper body posture and promote proper body mechanics. Progressed resistance, range and repetitions as indicated. Evaluation (  xx   ):    Manual Therapy (      ): Patella and patella tendon mobs. Soft tissue work to [employer] and HS. PROM for knee flex and extn. Therapeutic Modalities:    HEP: As above; handouts given to patient for all exercises. [employer] Portal    Treatment/Session Assessment:    · Response to Treatment:  . · Compliance with Program/Exercises: Will assess as treatment progresses. · Recommendations/Intent for next treatment session: \"Next visit will focus on aggressive ROM and strengthening of R knee and LE\".   Total Treatment Duration:  PT Patient Time In/Time Out  Time In: 0400  Time Out: 0500  Treatment number  7  Kellee Diaz, PT

## 2018-07-19 ENCOUNTER — HOSPITAL ENCOUNTER (OUTPATIENT)
Dept: SURGERY | Age: 68
Discharge: HOME | End: 2018-07-19

## 2018-07-19 ENCOUNTER — HOSPITAL ENCOUNTER (OUTPATIENT)
Dept: PHYSICAL THERAPY | Age: 68
Discharge: HOME | End: 2018-07-19
Payer: MEDICARE

## 2018-07-19 PROCEDURE — 97140 MANUAL THERAPY 1/> REGIONS: CPT

## 2018-07-19 PROCEDURE — 97110 THERAPEUTIC EXERCISES: CPT

## 2018-07-19 RX ORDER — TRAMADOL HYDROCHLORIDE 50 MG/1
50 TABLET ORAL
COMMUNITY

## 2018-07-19 NOTE — PROGRESS NOTES
Fortunato Nehemias  : 1950  [place] / Product Type: Medicare /  [address]  at [facility] Therapy  [address], [address]  [phone]   [phone]         OUTPATIENT PHYSICAL THERAPY:Daily Note 2018    ICD-10: Treatment Diagnosis:   M25.561 Pain in right knee       M25.661 Stiffness of right knee, not elsewhere classified       R26.2 Difficulty in walking, not elsewhere classified        Precautions/Allergies:   Review of patient's allergies indicates no known allergies. Fall Risk Score: 1 (? 5 = High Risk)  MD Orders: Eval and treat  MEDICAL/REFERRING DIAGNOSIS:  Presence of right artificial knee joint [Z96.651]   DATE OF ONSET: 2018  REFERRING PHYSICIAN: Samuel Hardy., *  RETURN PHYSICIAN APPOINTMENT: 4 weeks     INITIAL ASSESSMENT:  Ms. Bernie Vides presents     PROBLEM LIST (Impacting functional limitations):  1. Decreased Strength  2. Decreased ADL/Functional Activities  3. Decreased Ambulation Ability/Technique  4. Increased Pain  5. Decreased Flexibility/Joint Mobility  6. Edema/Girth INTERVENTIONS PLANNED:  1. Gait Training  2. Home Exercise Program (HEP)  3. Manual Therapy  4. Range of Motion (ROM)  5. Therapeutic Activites  6. Therapeutic Exercise/Strengthening   TREATMENT PLAN:  Effective Dates: 2018 TO 2018 (90 days). Frequency/Duration: 2 times a week for 90and upon reassessment, will adjust frequency and duration as progress indicates. GOALS: (Goals have been discussed and agreed upon with patient.)  Short-Term Functional Goals: Time Frame: 4 weeks   1. Establish independent HEP with no cueing. Discharge Goals: Time Frame: 12 weeks  1. Improve score on  Rehabilitation Potential For Stated Goals: Good  Regarding Aruna donovan, I certify that the treatment plan above will be carried out by a therapist or under their direction.   Thank you for this referral,  Roberto November, PT     Referring Physician Signature: Dann oRe., *              Date                    The information in this section was collected on 6/26/18(except where otherwise noted). HISTORY:   History of Present Injury/Illness (Reason for Referral):    Past Medical History/Comorbidities:   Ms. Wesley He  has a past medical history of Environmental and seasonal allergies; GERD (gastroesophageal reflux disease); Nausea & vomiting; Neuropathy; Osteoporosis; Postoperative stiffness of total knee replacement ([facility] [address] (6/25/2018); Primary osteoarthritis of right knee; and Status post total right knee replacement (4/13/2018). Ms. Wesley He  has a past surgical history that includes hx tonsillectomy (1962); hx colonoscopy (2012); hx tubal ligation (1978); and hx knee replacement (Right, 04/13/2018). Social History/Living Environment:     Lives with spouse   Prior Level of Function/Work/Activity:  Retired but works part time cleaning homes   Dominant Side:         RIGHT  Current Medications:       Current Outpatient Prescriptions:     traMADol (ULTRAM) 50 mg tablet, Take 50 mg by mouth every six (6) hours as needed for Pain. Take / use AM day of surgery  per anesthesia protocols prn, Disp: , Rfl:     HYDROmorphone (DILAUDID) 2 mg tablet, Take 1 Tab by mouth every four (4) hours as needed. Max Daily Amount: 12 mg., Disp: 40 Tab, Rfl: 0    aspirin delayed-release 81 mg tablet, Take 81 mg by mouth daily. , Disp: , Rfl:     calcium-vitamin D (OYSTER SHELL) 500 mg(1,250mg) -200 unit per tablet, Take 1 Tab by mouth daily. , Disp: , Rfl:    Date Last Reviewed:  7/19/2018   Number of Personal Factors/Comorbidities that affect the Plan of Care: 1-2: MODERATE COMPLEXITY   EXAMINATION:   Observation/Orthostatic Postural Assessment:            Palpation:            Functional Mobility:         Gait/Ambulation:  Independent with cane        Stairs:  Difficulty with stairs   ROM: Strength:                          Special Tests:   Neurological Screen:   Balance: Body Structures Involved:  1. Bones  2. Joints  3. Muscles  4. Ligaments Body Functions Affected:  1. Sensory/Pain  2. Neuromusculoskeletal  3. Movement Related Activities and Participation Affected:  1. Learning and Applying Knowledge  2. General Tasks and Demands  3. Mobility  4. Domestic Life  5. Community, Social and [place]   Number of elements (examined above) that affect the Plan of Care: 3: MODERATE COMPLEXITY   CLINICAL PRESENTATION:   Presentation: Evolving clinical presentation with changing clinical characteristics: MODERATE COMPLEXITY   CLINICAL DECISION MAKING:   Outcome Measure: Tool Used: Lower Extremity Functional Scale (LEFS)  Score:  Initial: 33/80 Most Recent: X/80 (Date: -- )   Interpretation of Score: 20 questions each scored on a 5 point scale with 0 representing \"extreme difficulty or unable to perform\" and 4 representing \"no difficulty\". The lower the score, the greater the functional disability. 80/80 represents no disability. Minimal detectable change is 9 points. Score 80 79-63 62-48 47-32 31-16 15-1 0   Modifier CH CI CJ CK CL CM CN     ? Mobility - Walking and Moving Around:     - CURRENT STATUS: CK - 40%-59% impaired, limited or restricted    - GOAL STATUS: CJ - 20%-39% impaired, limited or restricted    - D/C STATUS:  ---------------To be determined---------------    Medical Necessity:   · Patient is expected to demonstrate progress in strength, range of motion and gait to increase independence with home and community activities. Reason for Services/Other Comments:  · Patient continues to require skilled intervention due to decreased ROM and strength of R knee and LE.    Use of outcome tool(s) and clinical judgement create a POC that gives a: Clear prediction of patient's progress: LOW COMPLEXITY            TREATMENT:   (In addition to Assessment/Re-Assessment sessions the following treatments were rendered)  Pre-treatment Symptoms/Complaints:  Pain and stiffness of R knee. To have another manipulation on Monday. Will see Tues. Pain: Initial:   Pain Intensity 1: 3  Post Session:  2     THERAPEUTIC EXERCISE: (10 minutes):  Exercises per grid below to improve mobility, strength and gait. Required moderate verbal and manual cues to promote proper body alignment, promote proper body posture and promote proper body mechanics. Progressed resistance, range and repetitions as indicated. Manual Therapy ( 25 min ): Patella and patella tendon mobs. Soft tissue work to [employer] and HS. PROM for knee flex and extn. Therapeutic Modalities:    HEP: As above; handouts given to patient for all exercises. [employer] Portal    Treatment/Session Assessment:    · Response to Treatment:  . · Compliance with Program/Exercises: Will assess as treatment progresses. · Recommendations/Intent for next treatment session: \"Next visit will focus on aggressive ROM and strengthening of R knee and LE\".   Total Treatment Duration:  PT Patient Time In/Time Out  Time In: 0300  Time Out: 0335  Treatment number  KAITLYN Fritz

## 2018-07-19 NOTE — PERIOP NOTES
Patient verified name, , and surgery as listed in [facility]. Type 1B surgery, Phone assessment complete. Orders not received. Labs per surgeon: unknown  Labs per anesthesia protocol: none      Patient answered medical/surgical history questions at their best of ability. All prior to admission medications documented in [facility]. Patient instructed to take the following medications the day of surgery according to anesthesia guidelines with a small sip of water: tramadol prn . Hold all vitamins 7 days prior to surgery and NSAIDS 5 days prior to surgery. Medications to be held - none    Patient instructed on the following:  Arrive at [address], time of arrival to be called the day before by 1700  NPO after midnight including gum, mints, and ice chips  Responsible adult must drive patient to the hospital, stay during surgery, and patient will  need supervision 24 hours after anesthesia  Use antibacterial soap in shower the night before surgery and on the morning of surgery  Leave all valuables (money and jewelry) at home but bring insurance card and ID on       DOS  Do not wear make-up, nail polish, lotions, cologne, perfumes, powders, or oil on skin. Patient teach back successful and patient demonstrates knowledge of instruction.

## 2018-07-22 ENCOUNTER — ANESTHESIA EVENT (OUTPATIENT)
Dept: SURGERY | Age: 68
End: 2018-07-22
Payer: MEDICARE

## 2018-07-22 RX ORDER — MIDAZOLAM HYDROCHLORIDE 1 MG/ML
2 INJECTION, SOLUTION INTRAMUSCULAR; INTRAVENOUS ONCE
Status: CANCELLED | OUTPATIENT
Start: 2018-07-22 | End: 2018-07-22

## 2018-07-23 ENCOUNTER — ANESTHESIA (OUTPATIENT)
Dept: SURGERY | Age: 68
End: 2018-07-23
Payer: MEDICARE

## 2018-07-23 ENCOUNTER — APPOINTMENT (OUTPATIENT)
Dept: GENERAL RADIOLOGY | Age: 68
End: 2018-07-23
Attending: ORTHOPAEDIC SURGERY
Payer: MEDICARE

## 2018-07-23 ENCOUNTER — HOSPITAL ENCOUNTER (OUTPATIENT)
Age: 68
Setting detail: OBSERVATION
Discharge: HOME | End: 2018-07-24
Attending: ORTHOPAEDIC SURGERY | Admitting: ORTHOPAEDIC SURGERY
Payer: MEDICARE

## 2018-07-23 DIAGNOSIS — T84.89XA POSTOPERATIVE STIFFNESS OF TOTAL KNEE REPLACEMENT, INITIAL ENCOUNTER (HCC): Primary | ICD-10-CM

## 2018-07-23 DIAGNOSIS — Z96.659 POSTOPERATIVE STIFFNESS OF TOTAL KNEE REPLACEMENT, INITIAL ENCOUNTER (HCC): Primary | ICD-10-CM

## 2018-07-23 DIAGNOSIS — M25.669 POSTOPERATIVE STIFFNESS OF TOTAL KNEE REPLACEMENT, INITIAL ENCOUNTER (HCC): Primary | ICD-10-CM

## 2018-07-23 PROBLEM — M25.661 STIFFNESS OF RIGHT KNEE: Status: ACTIVE | Noted: 2018-07-23

## 2018-07-23 PROCEDURE — G8979 MOBILITY GOAL STATUS: HCPCS

## 2018-07-23 PROCEDURE — 97530 THERAPEUTIC ACTIVITIES: CPT

## 2018-07-23 PROCEDURE — 76210000006 HC OR PH I REC 0.5 TO 1 HR: Performed by: ORTHOPAEDIC SURGERY

## 2018-07-23 PROCEDURE — 94760 N-INVAS EAR/PLS OXIMETRY 1: CPT

## 2018-07-23 PROCEDURE — 74011250637 HC RX REV CODE- 250/637: Performed by: ANESTHESIOLOGY

## 2018-07-23 PROCEDURE — 73560 X-RAY EXAM OF KNEE 1 OR 2: CPT

## 2018-07-23 PROCEDURE — 74011000250 HC RX REV CODE- 250: Performed by: ANESTHESIOLOGY

## 2018-07-23 PROCEDURE — 77030003602 HC NDL NRV BLK BBMI -B: Performed by: NURSE ANESTHETIST, CERTIFIED REGISTERED

## 2018-07-23 PROCEDURE — 74011250636 HC RX REV CODE- 250/636: Performed by: ANESTHESIOLOGY

## 2018-07-23 PROCEDURE — 74011250637 HC RX REV CODE- 250/637: Performed by: ORTHOPAEDIC SURGERY

## 2018-07-23 PROCEDURE — G8978 MOBILITY CURRENT STATUS: HCPCS

## 2018-07-23 PROCEDURE — 76060000031 HC ANESTHESIA FIRST 0.5 HR: Performed by: ORTHOPAEDIC SURGERY

## 2018-07-23 PROCEDURE — 97161 PT EVAL LOW COMPLEX 20 MIN: CPT

## 2018-07-23 PROCEDURE — 99218 HC RM OBSERVATION: CPT

## 2018-07-23 PROCEDURE — 97116 GAIT TRAINING THERAPY: CPT

## 2018-07-23 PROCEDURE — 76010000230: Performed by: ORTHOPAEDIC SURGERY

## 2018-07-23 PROCEDURE — 74011250636 HC RX REV CODE- 250/636

## 2018-07-23 PROCEDURE — 77030027138 HC INCENT SPIROMETER -A

## 2018-07-23 PROCEDURE — 77030003602 HC NDL NRV BLK BBMI -B: Performed by: ANESTHESIOLOGY

## 2018-07-23 PROCEDURE — G8980 MOBILITY D/C STATUS: HCPCS

## 2018-07-23 PROCEDURE — 77030012891: Performed by: ORTHOPAEDIC SURGERY

## 2018-07-23 RX ORDER — PROPOFOL 10 MG/ML
INJECTION, EMULSION INTRAVENOUS AS NEEDED
Status: DISCONTINUED | OUTPATIENT
Start: 2018-07-23 | End: 2018-07-23 | Stop reason: HOSPADM

## 2018-07-23 RX ORDER — TRAMADOL HYDROCHLORIDE 50 MG/1
50 TABLET ORAL
Status: DISCONTINUED | OUTPATIENT
Start: 2018-07-23 | End: 2018-07-24 | Stop reason: HOSPADM

## 2018-07-23 RX ORDER — OXYCODONE HYDROCHLORIDE 5 MG/1
10 TABLET ORAL
Status: DISCONTINUED | OUTPATIENT
Start: 2018-07-23 | End: 2018-07-23 | Stop reason: HOSPADM

## 2018-07-23 RX ORDER — SODIUM CHLORIDE, SODIUM LACTATE, POTASSIUM CHLORIDE, CALCIUM CHLORIDE 600; 310; 30; 20 MG/100ML; MG/100ML; MG/100ML; MG/100ML
75 INJECTION, SOLUTION INTRAVENOUS CONTINUOUS
Status: DISCONTINUED | OUTPATIENT
Start: 2018-07-23 | End: 2018-07-23 | Stop reason: HOSPADM

## 2018-07-23 RX ORDER — ASPIRIN 81 MG/1
81 TABLET ORAL DAILY
Status: DISCONTINUED | OUTPATIENT
Start: 2018-07-23 | End: 2018-07-24 | Stop reason: HOSPADM

## 2018-07-23 RX ORDER — FAMOTIDINE 20 MG/1
20 TABLET, FILM COATED ORAL ONCE
Status: COMPLETED | OUTPATIENT
Start: 2018-07-23 | End: 2018-07-23

## 2018-07-23 RX ORDER — SODIUM CHLORIDE 0.9 % (FLUSH) 0.9 %
5-10 SYRINGE (ML) INJECTION AS NEEDED
Status: DISCONTINUED | OUTPATIENT
Start: 2018-07-23 | End: 2018-07-24 | Stop reason: HOSPADM

## 2018-07-23 RX ORDER — NALOXONE HYDROCHLORIDE 0.4 MG/ML
.2-.4 INJECTION, SOLUTION INTRAMUSCULAR; INTRAVENOUS; SUBCUTANEOUS
Status: DISCONTINUED | OUTPATIENT
Start: 2018-07-23 | End: 2018-07-24 | Stop reason: HOSPADM

## 2018-07-23 RX ORDER — MIDAZOLAM HYDROCHLORIDE 1 MG/ML
2 INJECTION, SOLUTION INTRAMUSCULAR; INTRAVENOUS ONCE
Status: COMPLETED | OUTPATIENT
Start: 2018-07-23 | End: 2018-07-23

## 2018-07-23 RX ORDER — HYDROMORPHONE HYDROCHLORIDE 2 MG/ML
1 INJECTION, SOLUTION INTRAMUSCULAR; INTRAVENOUS; SUBCUTANEOUS
Status: DISCONTINUED | OUTPATIENT
Start: 2018-07-23 | End: 2018-07-24 | Stop reason: HOSPADM

## 2018-07-23 RX ORDER — HYDROMORPHONE HYDROCHLORIDE 2 MG/ML
0.5 INJECTION, SOLUTION INTRAMUSCULAR; INTRAVENOUS; SUBCUTANEOUS
Status: DISCONTINUED | OUTPATIENT
Start: 2018-07-23 | End: 2018-07-23 | Stop reason: HOSPADM

## 2018-07-23 RX ORDER — OXYCODONE HYDROCHLORIDE 5 MG/1
5 TABLET ORAL
Status: DISCONTINUED | OUTPATIENT
Start: 2018-07-23 | End: 2018-07-23 | Stop reason: HOSPADM

## 2018-07-23 RX ORDER — ONDANSETRON 4 MG/1
4 TABLET, ORALLY DISINTEGRATING ORAL
Status: DISCONTINUED | OUTPATIENT
Start: 2018-07-23 | End: 2018-07-24 | Stop reason: HOSPADM

## 2018-07-23 RX ORDER — CETIRIZINE HCL 10 MG
10 TABLET ORAL DAILY
Status: DISCONTINUED | OUTPATIENT
Start: 2018-07-23 | End: 2018-07-24 | Stop reason: HOSPADM

## 2018-07-23 RX ORDER — LIDOCAINE HYDROCHLORIDE 10 MG/ML
0.1 INJECTION INFILTRATION; PERINEURAL AS NEEDED
Status: DISCONTINUED | OUTPATIENT
Start: 2018-07-23 | End: 2018-07-23 | Stop reason: HOSPADM

## 2018-07-23 RX ORDER — SODIUM CHLORIDE 0.9 % (FLUSH) 0.9 %
5-10 SYRINGE (ML) INJECTION EVERY 8 HOURS
Status: CANCELLED | OUTPATIENT
Start: 2018-07-23

## 2018-07-23 RX ORDER — FENTANYL CITRATE 50 UG/ML
100 INJECTION, SOLUTION INTRAMUSCULAR; INTRAVENOUS ONCE
Status: DISCONTINUED | OUTPATIENT
Start: 2018-07-23 | End: 2018-07-23 | Stop reason: HOSPADM

## 2018-07-23 RX ORDER — DIPHENHYDRAMINE HCL 25 MG
25 CAPSULE ORAL
Status: DISCONTINUED | OUTPATIENT
Start: 2018-07-23 | End: 2018-07-24 | Stop reason: HOSPADM

## 2018-07-23 RX ORDER — ROPIVACAINE HYDROCHLORIDE 2 MG/ML
INJECTION, SOLUTION EPIDURAL; INFILTRATION; PERINEURAL AS NEEDED
Status: DISCONTINUED | OUTPATIENT
Start: 2018-07-23 | End: 2018-07-23 | Stop reason: HOSPADM

## 2018-07-23 RX ORDER — HYDROMORPHONE HYDROCHLORIDE 2 MG/1
2 TABLET ORAL
Status: DISCONTINUED | OUTPATIENT
Start: 2018-07-23 | End: 2018-07-24 | Stop reason: HOSPADM

## 2018-07-23 RX ORDER — ASPIRIN 81 MG/1
81 TABLET ORAL EVERY 12 HOURS
Status: CANCELLED | OUTPATIENT
Start: 2018-07-23

## 2018-07-23 RX ORDER — CELECOXIB 200 MG/1
200 CAPSULE ORAL EVERY 12 HOURS
Status: DISCONTINUED | OUTPATIENT
Start: 2018-07-23 | End: 2018-07-24 | Stop reason: HOSPADM

## 2018-07-23 RX ORDER — CETIRIZINE HCL 10 MG
10 TABLET ORAL DAILY
COMMUNITY

## 2018-07-23 RX ORDER — ONDANSETRON 2 MG/ML
4 INJECTION INTRAMUSCULAR; INTRAVENOUS ONCE
Status: DISCONTINUED | OUTPATIENT
Start: 2018-07-23 | End: 2018-07-23 | Stop reason: HOSPADM

## 2018-07-23 RX ADMIN — FAMOTIDINE 20 MG: 20 TABLET, FILM COATED ORAL at 06:04

## 2018-07-23 RX ADMIN — LIDOCAINE HYDROCHLORIDE 0.1 ML: 10 INJECTION, SOLUTION INFILTRATION; PERINEURAL at 06:10

## 2018-07-23 RX ADMIN — ASPIRIN 81 MG: 81 TABLET, COATED ORAL at 12:14

## 2018-07-23 RX ADMIN — SODIUM CHLORIDE, SODIUM LACTATE, POTASSIUM CHLORIDE, AND CALCIUM CHLORIDE 75 ML/HR: 600; 310; 30; 20 INJECTION, SOLUTION INTRAVENOUS at 06:11

## 2018-07-23 RX ADMIN — HYDROMORPHONE HYDROCHLORIDE 2 MG: 2 TABLET ORAL at 22:01

## 2018-07-23 RX ADMIN — ROPIVACAINE HYDROCHLORIDE 40 ML: 2 INJECTION, SOLUTION EPIDURAL; INFILTRATION; PERINEURAL at 06:52

## 2018-07-23 RX ADMIN — CELECOXIB 200 MG: 200 CAPSULE ORAL at 21:16

## 2018-07-23 RX ADMIN — MIDAZOLAM HYDROCHLORIDE 1 MG: 1 INJECTION, SOLUTION INTRAMUSCULAR; INTRAVENOUS at 06:34

## 2018-07-23 RX ADMIN — ROPIVACAINE HYDROCHLORIDE 20 ML: 2 INJECTION, SOLUTION EPIDURAL; INFILTRATION; PERINEURAL at 06:56

## 2018-07-23 RX ADMIN — HYDROMORPHONE HYDROCHLORIDE 0.5 MG: 2 INJECTION, SOLUTION INTRAMUSCULAR; INTRAVENOUS; SUBCUTANEOUS at 07:42

## 2018-07-23 RX ADMIN — PROPOFOL 70 MG: 10 INJECTION, EMULSION INTRAVENOUS at 07:17

## 2018-07-23 RX ADMIN — ONDANSETRON 4 MG: 4 TABLET, ORALLY DISINTEGRATING ORAL at 08:00

## 2018-07-23 RX ADMIN — HYDROMORPHONE HYDROCHLORIDE 0.5 MG: 2 INJECTION, SOLUTION INTRAMUSCULAR; INTRAVENOUS; SUBCUTANEOUS at 07:37

## 2018-07-23 RX ADMIN — HYDROMORPHONE HYDROCHLORIDE 0.5 MG: 2 INJECTION, SOLUTION INTRAMUSCULAR; INTRAVENOUS; SUBCUTANEOUS at 07:32

## 2018-07-23 NOTE — IP AVS SNAPSHOT
[address] 
[phone] Patient: Giovani Doherty MRN: [id number] XAK:4/21/4683 About your hospitalization You were admitted on:  July 23, 2018 You last received care in the:  [facility] You were discharged on:  July 24, 2018 Why you were hospitalized Your primary diagnosis was:  Status Post Surgical Manipulation Of Knee Joint Your diagnoses also included:  Postoperative Stiffness Of Total Knee Replacement (Hcc), Stiffness Of Right Knee Follow-up Information Follow up With Details Comments Contact Info Mirella Navarro MD  As needed [address] [place] [address] 
[phone] Reinaldo Figueroa MD  As scheduled by office [address] [address] 
[phone] Your Scheduled Appointments Tuesday July 24, 2018 12:00 PM EDT  
PT Recurring Visit Zack Pickerel with Leonora Brianne, PT  
[facility] ([address]) [address] [facility] [address]  
[phone] Wednesday August 01, 2018  1:00 PM EDT  
PT Recurring Visit Zack Pickerel with Leonora Brianne, PT  
[facility] ([address]) [address] [place] [address]  
[phone] Friday August 03, 2018  1:00 PM EDT  
PT Recurring Visit Zack Pickerel with Leonora Brianne, PT  
[facility] ([address]) [address] [place] [id number]  
[phone] Discharge Orders Procedure Order Date Status Priority Quantity Spec Type Associated Dx CALL YOUR DOCTOR For: Temperature greater than 100.4., Severe uncontrolled pain. , Persistant nausea and vomiting., Persistant dizziness or light-headedness. , Hives, Difficulty breathing, headache, or visual disturbances. , Redness, tenderness, or s. .. 07/23/18 1244 Normal Routine 1  Postoperative stiffness of total knee replacement, initial encounter [facility]) [[id number]] Questions: For:  Temperature greater than 100.4. For:  Severe uncontrolled pain. For:  Persistant nausea and vomiting. For:  Persistant dizziness or light-headedness. For:  Barba Dixons For:  Difficulty breathing, headache, or visual disturbances. For:  Redness, tenderness, or signs of infection. ACTIVITY AFTER DISCHARGE Patient should: Restrict driving, Restrict lifting, Other (specify) 07/23/18 1244 Normal Routine 1  Postoperative stiffness of total knee replacement, initial encounter ([facility] [address] [[id number]] Questions: Patient should:  Restrict driving Patient should:  Restrict lifting Patient should: Other (specify) A check ya indicates which time of day the medication should be taken. My Medications CONTINUE taking these medications Instructions Each Dose to Equal  
 Morning Noon Evening Bedtime  
 aspirin delayed-release 81 mg tablet Your next dose is:  Tomorrow Take 81 mg by mouth daily. 81 mg  
    
  
   
   
   
  
 calcium-vitamin D 500 mg(1,250mg) -200 unit per tablet Commonly known as:  OYSTER SHELL Your next dose is:  Tomorrow Take 1 Tab by mouth daily. 1 Tab  
    
  
   
   
   
  
 traMADol 50 mg tablet Commonly known as:  ULTRAM  
Your next dose is: Today @ 1 pm, if needed Take 50 mg by mouth every six (6) hours as needed for Pain. Take / use AM day of surgery  per anesthesia protocols prn  
 50 mg  
    
   
   
  
   
  
 ZyrTEC 10 mg tablet Generic drug:  cetirizine Your next dose is:  Tomorrow Take 10 mg by mouth daily. 10 mg  
    
  
   
   
   
  
  
STOP taking these medications HYDROmorphone 2 mg tablet Commonly known as:  DILAUDID Opioid Education  Prescription Opioids: What You Need to Know: 
 
Prescription opioids can be used to help relieve moderate-to-severe pain and are often prescribed following a surgery or injury, or for certain health conditions. These medications can be an important part of treatment but also come with serious risks. Opioids are strong pain medicines. Examples include hydrocodone, oxycodone, fentanyl, and morphine. Heroin is an example of an illegal opioid. It is important to work with your health care provider to make sure you are getting the safest, most effective care. WHAT ARE THE RISKS AND SIDE EFFECTS OF OPIOID USE? Prescription opioids carry serious risks of addiction and overdose, especially with prolonged use. An opioid overdose, often marked by slow breathing, can cause sudden death. The use of prescription opioids can have a number of side effects as well, even when taken as directed. · Tolerance-meaning you might need to take more of a medication for the same pain relief · Physical dependence-meaning you have symptoms of withdrawal when the medication is stopped. Withdrawal symptoms can include nausea, sweating, chills, diarrhea, stomach cramps, and muscle aches. Withdrawal can last up to several weeks, depending on which drug you took and how long you took it. · Increased sensitivity to pain · Constipation · Nausea, vomiting, and dry mouth · Sleepiness and dizziness · Confusion · Depression · Low levels of testosterone that can result in lower sex drive, energy, and strength · Itching and sweating RISKS ARE GREATER WITH:      
· History of drug misuse, substance use disorder, or overdose · Mental health conditions (such as depression or anxiety) · Sleep apnea · Older age (72 years or older) · Pregnancy Avoid alcohol while taking prescription opioids. Also, unless specifically advised by your health care provider, medications to avoid include: · Benzodiazepines (such as Xanax or Valium) · Muscle relaxants (such as Soma or Flexeril) · Hypnotics (such as Ambien or Lunesta) · Other prescription opioids KNOW YOUR OPTIONS Talk to your health care provider about ways to manage your pain that don't involve prescription opioids. Some of these options may actually work better and have fewer risks and side effects. Options may include: 
· Pain relievers such as acetaminophen, ibuprofen, and naproxen · Some medications that are also used for depression or seizures · Physical therapy and exercise · Counseling to help patients learn how to cope better with triggers of pain and stress. · Application of heat or cold compress · Massage therapy · Relaxation techniques Be Informed Make sure you know the name of your medication, how much and how often to take it, and its potential risks & side effects. IF YOU ARE PRESCRIBED OPIOIDS FOR PAIN: 
· Never take opioids in greater amounts or more often than prescribed. Remember the goal is not to be pain-free but to manage your pain at a tolerable level. · Follow up with your primary care provider to: · Work together to create a plan on how to manage your pain. · Talk about ways to help manage your pain that don't involve prescription opioids. · Talk about any and all concerns and side effects. · Help prevent misuse and abuse. · Never sell or share prescription opioids · Help prevent misuse and abuse. · Store prescription opioids in a secure place and out of reach of others (this may include visitors, children, friends, and family). · Safely dispose of unused/unwanted prescription opioids: Find your community drug take-back program or your pharmacy mail-back program, or flush them down the toilet, following guidance from the Food and Drug Administration (www.fda.gov/Drugs/ResourcesForYou). · Visit www.cdc.gov/drugoverdose to learn about the risks of opioid abuse and overdose. · If you believe you may be struggling with addiction, tell your health care provider and ask for guidance or call [employer] at 6-945-728-MECZ. Discharge Instructions [facility] [employer] Patient Discharge Instructions Jean-Paul Brice / [id number] : 1950 Admitted 2018 Discharged: 2018 IF YOU HAVE ANY PROBLEMS ONCE YOU ARE AT HOME CALL THE FOLLOWING NUMBERS:  
Main office number: [phone] Medications · The medications you are to continue on are listed on the medication reconciliation sheet. · It is important that you take the medication exactly as they are prescribed. · Medications which increase your risk of blood clots are listed to stop for 5 weeks after surgery as well as medications or supplements which increase your risk of bleeding complications. · Keep your medication in the bottles provided by the pharmacist and keep a list of the medication names, dosages, and times to be taken in your wallet. · Do not take other medications without consulting your doctor. [facility] Resume your prehospital diet. If you have excessive nausea or vomitting call your doctor's office Continue Physical Therapy Exercises Do not drive if taking narcotic pain medication When to Call - Call if you have a temperature greater then 101 - Are unable to bear any weight  
- Need a pain medication refill - The dressing becomes saturated Information obtained by : 
I understand that if any problems occur once I am at home I am to contact my physician. I understand and acknowledge receipt of the instructions indicated above. Physician's or R.N.'s Signature                                                                  Date/Time Patient or Representative Signature                                                          Date/Time Introducing [facility]! [facility] Lopes introduces [employer] patient portal. Now you can access parts of your medical record, email your doctor's office, and request medication refills online. 1. In your internet browser, go to [email or web address] 2. Click on the First Time User? Click Here link in the Sign In box. You will see the New Member Sign Up page. 3. Enter your Glycode Access Code exactly as it appears below. You will not need to use this code after youve completed the sign-up process. If you do not sign up before the expiration date, you must request a new code. · Glycode Access Code: [email or web address] Expires: 9/2/2018  8:26 AM 
 
4. Enter the last four digits of your Social Security Number (xxxx) and Date of Birth (mm/dd/yyyy) as indicated and click Submit. You will be taken to the next sign-up page. 5. Create a Glycode ID. This will be your Glycode login ID and cannot be changed, so think of one that is secure and easy to remember. 6. Create a Glycode password. You can change your password at any time. 7. Enter your Password Reset Question and Answer. This can be used at a later time if you forget your password. 8. Enter your e-mail address. You will receive e-mail notification when new information is available in [address] 9. Click Sign Up. You can now view and download portions of your medical record. 10. Click the Download Summary menu link to download a portable copy of your medical information. If you have questions, please visit the Frequently Asked Questions section of the Glycode website. Remember, Glycode is NOT to be used for urgent needs. For medical emergencies, dial 911. Now available from your iPhone and Android! Introducing Edwardo Valdovinos As a [facility] Lopes patient, I wanted to make you aware of our electronic visit tool called Edwardo Valdovinos. [employer] 24/7 allows you to connect within minutes with a medical provider 24 hours a day, seven days a week via a mobile device or tablet or logging into a secure website from your computer. You can access [employer] from anywhere in the [place]. A virtual visit might be right for you when you have a simple condition and feel like you just dont want to get out of bed, or cant get away from work for an appointment, when your regular [employer] provider is not available (evenings, weekends or holidays), or when youre out of town and need minor care. Electronic visits cost only $49 and if the [employer] 24/7 provider determines a prescription is needed to treat your condition, one can be electronically transmitted to a nearby pharmacy*. Please take a moment to enroll today if you have not already done so. The enrollment process is free and takes just a few minutes. To enroll, please download the [employer] 24/7 minal to your tablet or phone, or visit [email or web address] to enroll on your computer. And, as an [address] [place] [address] patient with a [employer] account, the results of your visits will be scanned into your electronic medical record and your primary care provider will be able to view the scanned results. We urge you to continue to see your regular [employer] provider for your ongoing medical care. And while your primary care provider may not be the one available when you seek a [facility] virtual visit, the peace of mind you get from getting a real diagnosis real time can be priceless. For more information on [facility], view our Frequently Asked Questions (FAQs) at [email or web address]. Sincerely, 
 
Laura Dickens MD 
Chief Medical Officer Milka Hammer *:  certain medications cannot be prescribed via [facility] Providers Seen During Your Hospitalization Provider Specialty Primary office phone Sammy Martinez MD Orthopedic Surgery [phone] Your Primary Care Physician (PCP) Primary Care Physician Office Phone Office Fax [address] Shayan Becker [address] You are allergic to the following No active allergies Recent Documentation Height Weight BMI Smoking Status 1.6 m 57.9 kg 22.6 kg/m2 Never Smoker Emergency Contacts Name Discharge Info Relation Home Work Mobile AngeloRony DISCHARGE CAREGIVER [3] Spouse [3] [phone] Patient Belongings The following personal items are in your possession at time of discharge: 
  Dental Appliances: None  Visual Aid: None      Home Medications: None   Jewelry: None  Clothing: Footwear, Pants, Shirt, Undergarments    Other Valuables: None Please provide this summary of care documentation to your next provider. Signatures-by signing, you are acknowledging that this After Visit Summary has been reviewed with you and you have received a copy. Patient Signature:  ____________________________________________________________ Date:  ____________________________________________________________  
  
Yara Lopez Provider Signature:  ____________________________________________________________ Date:  ____________________________________________________________

## 2018-07-23 NOTE — PROGRESS NOTES
Problem: Mobility Impaired (Adult and Pediatric)  Goal: *Acute Goals and Plan of Care (Insert Text)  GOALS (1-4 days):  (1.)Ms. Alvarez Uriarte will move from supine to sit and sit to supine  in bed with SUPERVISION. (2.)Ms. Alvarez Uriarte will transfer from bed to chair and chair to bed with SUPERVISION using the least restrictive device. (3.)Ms. Alvarez Uriarte will ambulate with SUPERVISION for 250 feet with the least restrictive device. (4.)Ms. Alvarez Uriarte will ambulate up/down 3 steps with bilateral  railing with MINIMAL ASSIST with no device. (5.)Ms. Alvarez Uriarte will increase right knee ROM to 5°-80°.  ________________________________________________________________________________________________    Outcome: Progressing Towards Goal    PHYSICAL THERAPY JOINT CAMP: Daily Note, Treatment Day: 1st, PM 7/23/2018  OBSERVATION: Hospital Day: 1  [place] / Product Type: Medicare /      NAME/AGE/GENDER: Elle Mccormick is a 76 y.o. female   PRIMARY DIAGNOSIS:  Stiffness of right knee [M25.661]   Procedure(s) and Anesthesia Type:     * RIGHT KNEE MANIPULATION - Regional (Right)  ICD-10: Treatment Diagnosis:    · Pain in Right Knee (M25.561)  · Stiffness of Right Knee, Not elsewhere classified (M25.661)  · Difficulty in walking, Not elsewhere classified (R26.2)  · Other abnormalities of gait and mobility (R26.89)      ASSESSMENT:     Ms. Alvarez Uriarte presents status post right total knee manipulation with decreased independence with functional mobility. Pt performed supine to sit to stand, as below. Pt performed stand pivot transfer to bedside commode and back to bed, as below. Pt on CPM -5 degrees to 120 degrees. 7/23 PM--Pt with numbness. Pt performs transfers to bedside commode. This section established at most recent assessment   PROBLEM LIST (Impairments causing functional limitations):  1. Decreased Strength  2. Decreased Transfer Abilities  3. Decreased Ambulation Ability/Technique  4.  Decreased Balance  5. Increased Pain  6. Decreased Activity Tolerance   INTERVENTIONS PLANNED: (Benefits and precautions of physical therapy have been discussed with the patient.)  1. Bed Mobility  2. Gait Training  3. Home Exercise Program (HEP)  4. Therapeutic Exercise/Strengthening  5. Transfer Training  6. Range of Motion: active/assisted/passive  7. Therapeutic Activities  8. Group Therapy     TREATMENT PLAN: Frequency/Duration: Follow patient BID for duration of hospital stay to address above goals. Rehabilitation Potential For Stated Goals: Fair     RECOMMENDED REHABILITATION/EQUIPMENT: (at time of discharge pending progress): Outpatient: Physical Therapy. HISTORY:   History of Present Injury/Illness (Reason for Referral):  Pt is status post right total knee manipulation. Past Medical History/Comorbidities:   Ms. Alvin Alarcon  has a past medical history of Environmental and seasonal allergies; GERD (gastroesophageal reflux disease); Nausea & vomiting; Neuropathy; Osteoporosis; Postoperative stiffness of total knee replacement ([facility] [address] (6/25/2018); Primary osteoarthritis of right knee; and Status post total right knee replacement (4/13/2018). Ms. Alvin Alarcon  has a past surgical history that includes hx tonsillectomy (1962); hx colonoscopy (2012); hx tubal ligation (1978); and hx knee replacement (Right, 04/13/2018). Social History/Living Environment:   Home Environment: Private residence  One/Two Story Residence: One story  Living Alone: No  Support Systems: Spouse/Significant Other/Partner  Patient Expects to be Discharged to[de-identified] Private residence  Current DME Used/Available at Home: None  Prior Level of Function/Work/Activity:  Independent with ambulation.    Number of Personal Factors/Comorbidities that affect the Plan of Care: 0: LOW COMPLEXITY   EXAMINATION:   Most Recent Physical Functioning:      Gross Assessment  AROM: Generally decreased, functional  Strength: Generally decreased, functional  Coordination: Generally decreased, functional                     Bed Mobility  Supine to Sit: Stand-by assistance  Sit to Supine: Stand-by assistance    Transfers  Sit to Stand: Contact guard assistance  Stand to Sit: Contact guard assistance  Bed to Chair: Stand-by assistance    Balance  Sitting: Intact  Standing: Pull to stand; With support    Posture  Posture (WDL): Within defined limits            Distance (ft): 8 Feet (ft)  Ambulation - Level of Assistance: Contact guard assistance  Gait Abnormalities: Other (unsteady gait)        Braces/Orthotics: none    Right Knee Cold  Type: Cryocuff      Body Structures Involved:  1. Bones  2. Joints  3. Muscles  4. Ligaments Body Functions Affected:  1. Neuromusculoskeletal  2. Movement Related Activities and Participation Affected:  1. Mobility   Number of elements that affect the Plan of Care: 4+: HIGH COMPLEXITY   CLINICAL PRESENTATION:   Presentation: Stable and uncomplicated: LOW COMPLEXITY   CLINICAL DECISION MAKIN[address] [place] [address] AM-PAC 6 Clicks   Basic Mobility Inpatient Short Form  How much difficulty does the patient currently have. .. Unable A Lot A Little None   1. Turning over in bed (including adjusting bedclothes, sheets and blankets)? [] 1   [] 2   [x] 3   [] 4   2. Sitting down on and standing up from a chair with arms ( e.g., wheelchair, bedside commode, etc.)   [] 1   [] 2   [x] 3   [] 4   3. Moving from lying on back to sitting on the side of the bed? [] 1   [] 2   [x] 3   [] 4   How much help from another person does the patient currently need. .. Total A Lot A Little None   4. Moving to and from a bed to a chair (including a wheelchair)? [] 1   [] 2   [x] 3   [] 4   5. Need to walk in hospital room? [] 1   [] 2   [x] 3   [] 4   6. Climbing 3-5 steps with a railing? [] 1   [] 2   [x] 3   [] 4   © , Trustees of [address] [place] [address], under license to [employer].  All rights reserved        Score:  Initial: 18 Most Recent: X (Date: -- ) Interpretation of Tool:  Represents activities that are increasingly more difficult (i.e. Bed mobility, Transfers, Gait). Score 24 23 22-20 19-15 14-10 9-7 6     Modifier CH CI CJ CK CL CM CN      ? Mobility - Walking and Moving Around:     - CURRENT STATUS: CK - 40%-59% impaired, limited or restricted    - GOAL STATUS: CK - 40%-59% impaired, limited or restricted    - D/C STATUS:  CK - 40%-59% impaired, limited or restricted  [place] / [place] / Product Type: Medicare /      Medical Necessity:     · Skilled intervention continues to be required due to decreased mobility ability. Reason for Services/Other Comments:  · Patient continues to require skilled intervention due to decreased mobility ability. Use of outcome tool(s) and clinical judgement create a POC that gives a: Clear prediction of patient's progress: LOW COMPLEXITY            TREATMENT:   (In addition to Assessment/Re-Assessment sessions the following treatments were rendered)     Pre-treatment Symptoms/Complaints:  No complaints  Pain: Initial:   Pain Intensity 1: 0  Post Session:  No complaints     Therapeutic Activity: (    15 minutes): Therapeutic activities including Chair transfers to improve mobility. Gait Training (  15 minutes):  Gait training to improve and/or restore physical functioning as related to mobility. Ambulation - Level of Assistance: Contact guard assistance  Distance (ft): 8 Feet (ft)        Date:   Date:   Date:     ACTIVITY/EXERCISE AM PM AM PM AM PM   GROUP THERAPY  []  []  []  []  []  []   Ankle Pumps         Quad Sets         Gluteal Sets         Hip ABd/ADduction         Straight Leg Raises         Knee Slides         Short Arc Quads         Long Arc Quads         Chair Slides                  B = bilateral; AA = active assistive; A = active; P = passive      Treatment/Session Assessment:     Response to Treatment:  Pt requesting to use potty.  Pt agreeable to use bedside commode, despite numbness. Education:  [] Home Exercises  [] Fall Precautions  [] Hip Precautions [] D/C Instruction Review  [] Knee/Hip Prosthesis Review  [x] Walker Management/Safety [] Adaptive Equipment as Needed       Interdisciplinary Collaboration:   o Physical Therapist  o Registered Nurse    After treatment position/precautions:   o Supine in bed  o Bed/Chair-wheels locked  o Bed in low position  o Caregiver at bedside  o Call light within reach  o RN notified    Compliance with Program/Exercises: compliant most of the time. Recommendations/Intent for next treatment session:  Treatment next visit will focus on increasing Ms. Stephens's independence with bed mobility, transfers, gait training, strength/ROM exercises, modalities for pain, and patient education.       Total Treatment Duration:  PT Patient Time In/Time Out  Time In: 1500  Time Out: 126 Eva Hensley PT

## 2018-07-23 NOTE — ANESTHESIA PROCEDURE NOTES
Peripheral Block    Start time: 7/23/2018 6:40 AM  End time: 7/23/2018 6:42 AM  Performed by: Jane Leonard  Authorized by: Jane Leonard       Pre-procedure: Indications: at surgeon's request, post-op pain management and procedure for pain    Preanesthetic Checklist: patient identified, risks and benefits discussed, site marked, timeout performed, anesthesia consent given and patient being monitored    Timeout Time: 06:40          Block Type:   Block Type:   Adductor canal  Laterality:  Right  Monitoring:  Standard ASA monitoring, continuous pulse ox, frequent vital sign checks, heart rate, responsive to questions and oxygen  Injection Technique:  Single shot  Procedures: ultrasound guided    Patient Position: supine  Prep: chlorhexidine    Location:  Mid thigh  Needle Type:  Stimuplex  Needle Gauge:  21 G  Needle Localization:  Ultrasound guidance and anatomical landmarks  Medication Injected:  0.2%  ropivacaine  Volume (mL):  20    Assessment:  Number of attempts:  1  Injection Assessment:  Incremental injection every 5 mL, local visualized surrounding nerve on ultrasound, negative aspiration for blood, no paresthesia, no intravascular symptoms and ultrasound image on chart  Patient tolerance:  Patient tolerated the procedure well with no immediate complications

## 2018-07-23 NOTE — ANESTHESIA POSTPROCEDURE EVALUATION
Post-Anesthesia Evaluation and Assessment    Patient: Bart Founds MRN: [id number]  SSN: [id number]    YOB: 1950  Age: 76 y.o. Sex: female       Cardiovascular Function/Vital Signs  Visit Vitals    /67 (BP 1 Location: Right arm, BP Patient Position: At rest)    Pulse 65    Temp 37 °C (98.6 °F)    Resp 14    Ht 5' 3\" (1.6 m)    Wt 57.9 kg (127 lb 9.6 oz)    SpO2 100%    BMI 22.6 kg/m2       Patient is status post general anesthesia for Procedure(s):  RIGHT KNEE MANIPULATION. Nausea/Vomiting: None    Postoperative hydration reviewed and adequate. Pain:  Pain Scale 1: Visual (07/23/18 0811)  Pain Intensity 1: 0 (07/23/18 0811)   Managed    Neurological Status:   Neuro (WDL): Exceptions to [facility] (07/23/18 3595)  Neuro  Neurologic State: Drowsy; Eyes open to voice (07/23/18 0811)  Cognition: Follows commands (07/23/18 2543)  RLE Motor Response: Weak (07/23/18 0709)   At baseline    Mental Status and Level of Consciousness: Arousable    Pulmonary Status:   O2 Device: Nasal cannula (07/23/18 0811)   Adequate oxygenation and airway patent    Complications related to anesthesia: None    Post-anesthesia assessment completed.  No concerns    Signed By: Amber Edwards MD     July 23, 2018

## 2018-07-23 NOTE — PROGRESS NOTES
Initial visit by  to convey care and concern and encourage patient that  services are available if desired. No needs were voiced during the visit. Provided business card for future reference.      Salvador Chavez 68  Board Certified

## 2018-07-23 NOTE — OP NOTES
[address] [facility]  Closed Manipulation of Total Knee Arthroplasty    Ronen Jones   : 1950  Medical Record [id number]  Pre-operative Diagnosis:  Stiffness of right knee [M25.661]   Post-operative Diagnosis: Stiffness of right knee [M25.661]  Location: [address] [place] [address]  Surgeon: Wesley Dewitt MD    Anesthesia: Regional  EBL: none    Procedure: The patient was brought to the PACU. A femoral nerve block was administered. The patient the received general anesthesia after a timeout was done. A closed manipulation was performed on the right  Knee. Preoperatively the ROM was 10 to 90 degrees. The knee was manipulated. Upon completion the ROM was 0 to 120 degrees. Ligaments were stable. Extensor mechanism was intact. Ice was applied to the knee.  The patient tolerated the procedure w/o difficulty         Signed By: Wesley Dewitt MD  2018,  7:28 AM

## 2018-07-23 NOTE — ANESTHESIA PROCEDURE NOTES
Peripheral Block    Start time: 7/23/2018 6:34 AM  End time: 7/23/2018 6:39 AM  Performed by: King Bradley  Authorized by: King Bradley       Pre-procedure:    Indications: at surgeon's request, post-op pain management and procedure for pain    Preanesthetic Checklist: patient identified, risks and benefits discussed, site marked, timeout performed, anesthesia consent given and patient being monitored    Timeout Time: 06:34          Block Type:   Block Type:  Popliteal  Laterality:  Right  Monitoring:  Standard ASA monitoring, continuous pulse ox, frequent vital sign checks, heart rate, oxygen and responsive to questions  Injection Technique:  Single shot  Procedures: ultrasound guided and nerve stimulator    Patient Position: supine  Prep: chlorhexidine    Location:  Mid thigh  Needle Type:  Stimuplex  Needle Gauge:  21 G  Needle Localization:  Ultrasound guidance and anatomical landmarks  Motor Response: minimal motor response >0.4 mA    Medication Injected:  0.2%  Volume (mL):  40    Assessment:  Number of attempts:  1  Injection Assessment:  Incremental injection every 5 mL, local visualized surrounding nerve on ultrasound, negative aspiration for blood, negative aspiration for CSF, no paresthesia, no intravascular symptoms and ultrasound image on chart  Patient tolerance:  Patient tolerated the procedure well with no immediate complications

## 2018-07-23 NOTE — PROGRESS NOTES
Problem: Mobility Impaired (Adult and Pediatric)  Goal: *Acute Goals and Plan of Care (Insert Text)  GOALS (1-4 days):  (1.)Ms. Pushpa Dominguez will move from supine to sit and sit to supine  in bed with SUPERVISION. (2.)Ms. Pushpa Dominguez will transfer from bed to chair and chair to bed with SUPERVISION using the least restrictive device. (3.)Ms. Pushpa Dominguez will ambulate with SUPERVISION for 250 feet with the least restrictive device. (4.)Ms. Pushpa Dominguez will ambulate up/down 3 steps with bilateral  railing with MINIMAL ASSIST with no device. (5.)Ms. Pushpa Dominguez will increase right knee ROM to 5°-80°.  ________________________________________________________________________________________________    Outcome: Progressing Towards Goal    PHYSICAL THERAPY JOINT CAMP: Initial Assessment 7/23/2018  OBSERVATION: Hospital Day: 1  [place] / Product Type: Medicare /      NAME/AGE/GENDER: Cody Galaviz is a 76 y.o. female   PRIMARY DIAGNOSIS:  Stiffness of right knee [M25.661]   Procedure(s) and Anesthesia Type:     * RIGHT KNEE MANIPULATION - Regional (Right)  ICD-10: Treatment Diagnosis:    · Pain in Right Knee (M25.561)  · Stiffness of Right Knee, Not elsewhere classified (M25.661)  · Difficulty in walking, Not elsewhere classified (R26.2)  · Other abnormalities of gait and mobility (R26.89)      ASSESSMENT:     Ms. Pushpa Dominguez presents status post right total knee manipulation with decreased independence with functional mobility. Pt performed supine to sit to stand, as below. Pt performed stand pivot transfer to bedside commode and back to bed, as below. Pt on CPM -5 degrees to 120 degrees. This section established at most recent assessment   PROBLEM LIST (Impairments causing functional limitations):  1. Decreased Strength  2. Decreased Transfer Abilities  3. Decreased Ambulation Ability/Technique  4. Decreased Balance  5. Increased Pain  6.  Decreased Activity Tolerance   INTERVENTIONS PLANNED: (Benefits and precautions of physical therapy have been discussed with the patient.)  1. Bed Mobility  2. Gait Training  3. Home Exercise Program (HEP)  4. Therapeutic Exercise/Strengthening  5. Transfer Training  6. Range of Motion: active/assisted/passive  7. Therapeutic Activities  8. Group Therapy     TREATMENT PLAN: Frequency/Duration: Follow patient BID for duration of hospital stay to address above goals. Rehabilitation Potential For Stated Goals: Fair     RECOMMENDED REHABILITATION/EQUIPMENT: (at time of discharge pending progress): Outpatient: Physical Therapy. HISTORY:   History of Present Injury/Illness (Reason for Referral):  Pt is status post right total knee manipulation. Past Medical History/Comorbidities:   Ms. Andrew Bustillo  has a past medical history of Environmental and seasonal allergies; GERD (gastroesophageal reflux disease); Nausea & vomiting; Neuropathy; Osteoporosis; Postoperative stiffness of total knee replacement ([address] (6/25/2018); Primary osteoarthritis of right knee; and Status post total right knee replacement (4/13/2018). Ms. Andrew Bustillo  has a past surgical history that includes hx tonsillectomy (1962); hx colonoscopy (2012); hx tubal ligation (1978); and hx knee replacement (Right, 04/13/2018). Social History/Living Environment:   Home Environment: Private residence  One/Two Story Residence: One story  Living Alone: No  Support Systems: Spouse/Significant Other/Partner  Patient Expects to be Discharged to[de-identified] Private residence  Current DME Used/Available at Home: None  Prior Level of Function/Work/Activity:  Independent with ambulation.    Number of Personal Factors/Comorbidities that affect the Plan of Care: 0: LOW COMPLEXITY   EXAMINATION:   Most Recent Physical Functioning:      Gross Assessment  AROM: Generally decreased, functional  Strength: Generally decreased, functional  Coordination: Generally decreased, functional                     Bed Mobility  Supine to Sit: Stand-by assistance  Sit to Supine: Contact guard assistance    Transfers  Sit to Stand: Contact guard assistance  Stand to Sit: Contact guard assistance  Bed to Chair: Stand-by assistance    Balance  Sitting: Intact  Standing: Pull to stand; With support    Posture  Posture (WDL): Within defined limits            Distance (ft): 8 Feet (ft) (to bedside chair)  Ambulation - Level of Assistance: Stand-by assistance  Gait Abnormalities: Other (unsteady gait)        Braces/Orthotics: none    Right Knee Cold  Type: Cryocuff      Body Structures Involved:  1. Bones  2. Joints  3. Muscles  4. Ligaments Body Functions Affected:  1. Neuromusculoskeletal  2. Movement Related Activities and Participation Affected:  1. Mobility   Number of elements that affect the Plan of Care: 4+: HIGH COMPLEXITY   CLINICAL PRESENTATION:   Presentation: Stable and uncomplicated: LOW COMPLEXITY   CLINICAL DECISION MAKIN[address] [place] [id number] AM-PAC 6 Clicks   Basic Mobility Inpatient Short Form  How much difficulty does the patient currently have. .. Unable A Lot A Little None   1. Turning over in bed (including adjusting bedclothes, sheets and blankets)? [] 1   [] 2   [x] 3   [] 4   2. Sitting down on and standing up from a chair with arms ( e.g., wheelchair, bedside commode, etc.)   [] 1   [] 2   [x] 3   [] 4   3. Moving from lying on back to sitting on the side of the bed? [] 1   [] 2   [x] 3   [] 4   How much help from another person does the patient currently need. .. Total A Lot A Little None   4. Moving to and from a bed to a chair (including a wheelchair)? [] 1   [] 2   [x] 3   [] 4   5. Need to walk in hospital room? [] 1   [] 2   [x] 3   [] 4   6. Climbing 3-5 steps with a railing? [] 1   [] 2   [x] 3   [] 4   © , Trustees of [address] [place] [id number], under license to [employer].  All rights reserved        Score:  Initial: 18 Most Recent: X (Date: -- )    Interpretation of Tool:  Represents activities that are increasingly more difficult (i.e. Bed mobility, Transfers, Gait). Score 24 23 22-20 19-15 14-10 9-7 6     Modifier CH CI CJ CK CL CM CN      ? Mobility - Walking and Moving Around:     - CURRENT STATUS: CK - 40%-59% impaired, limited or restricted    - GOAL STATUS: CK - 40%-59% impaired, limited or restricted    - D/C STATUS:  ---------------To be determined---------------  [place] / [place] / Product Type: Medicare /      Medical Necessity:     · Skilled intervention continues to be required due to decreased mobility ability. Reason for Services/Other Comments:  · Patient continues to require skilled intervention due to decreased mobility ability. Use of outcome tool(s) and clinical judgement create a POC that gives a: Clear prediction of patient's progress: LOW COMPLEXITY            TREATMENT:   (In addition to Assessment/Re-Assessment sessions the following treatments were rendered)     Pre-treatment Symptoms/Complaints:  No complaints  Pain: Initial:   Pain Intensity 1: 0  Post Session:  No complaints     Assessment/Reassessment only, no treatment provided today    Date:   Date:   Date:     ACTIVITY/EXERCISE AM PM AM PM AM PM   GROUP THERAPY  []  []  []  []  []  []   Ankle Pumps         Quad Sets         Gluteal Sets         Hip ABd/ADduction         Straight Leg Raises         Knee Slides         Short Arc Quads         Long Arc Quads         Chair Slides                  B = bilateral; AA = active assistive; A = active; P = passive      Treatment/Session Assessment:     Response to Treatment:  Pt requesting to use potty. Pt agreeable to use bedside commode, despite numbness.     Education:  [] Home Exercises  [] Fall Precautions  [] Hip Precautions [] D/C Instruction Review  [] Knee/Hip Prosthesis Review  [x] Walker Management/Safety [] Adaptive Equipment as Needed       Interdisciplinary Collaboration:   o Physical Therapist  o Registered Nurse    After treatment position/precautions:   o Supine in bed  o Bed/Chair-wheels locked  o Bed in low position  o Caregiver at bedside  o Call light within reach  o RN notified    Compliance with Program/Exercises: compliant most of the time. Recommendations/Intent for next treatment session:  Treatment next visit will focus on increasing Ms. Stephens's independence with bed mobility, transfers, gait training, strength/ROM exercises, modalities for pain, and patient education.       Total Treatment Duration:  PT Patient Time In/Time Out  Time In: 1145  Time Out: [address], PT

## 2018-07-23 NOTE — PROGRESS NOTES
PT Note: S: Pt drowsy. Pt has numbness in her right lower extremity. O: Pt supine. Supportive male present. A: Educated pt on PT goals and plan of care. P: Continue with CPM and progress as pt able.

## 2018-07-23 NOTE — ANESTHESIA PREPROCEDURE EVALUATION
Anesthetic History   No history of anesthetic complications            Review of Systems / Medical History  Patient summary reviewed and pertinent labs reviewed    Pulmonary  Within defined limits                 Neuro/Psych   Within defined limits           Cardiovascular  Within defined limits                Exercise tolerance: >4 METS     GI/Hepatic/Renal     GERD: well controlled           Endo/Other  Within defined limits           Other Findings              Physical Exam    Airway  Mallampati: II  TM Distance: 4 - 6 cm  Neck ROM: normal range of motion   Mouth opening: Normal     Cardiovascular  Regular rate and rhythm,  S1 and S2 normal,  no murmur, click, rub, or gallop             Dental         Pulmonary  Breath sounds clear to auscultation               Abdominal  GI exam deferred       Other Findings            Anesthetic Plan    ASA: 1  Anesthesia type: general      Post-op pain plan if not by surgeon: peripheral nerve block single      Anesthetic plan and risks discussed with: Patient

## 2018-07-23 NOTE — PERIOP NOTES
TRANSFER - OUT REPORT:    Verbal report given to Magnolia Newsome RN (name) on [place]  being transferred to room 324 (unit) for routine progression of care       Report consisted of patients Situation, Background, Assessment and   Recommendations(SBAR). Information from the following report(s) SBAR, Kardex, Procedure Summary, MAR, Recent Results and Med Rec Status was reviewed with the receiving nurse. Lines:   Peripheral IV 07/23/18 Left Wrist (Active)   Site Assessment Clean, dry, & intact 7/23/2018  7:26 AM   Phlebitis Assessment 0 7/23/2018  7:26 AM   Infiltration Assessment 0 7/23/2018  7:26 AM   Dressing Status Clean, dry, & intact 7/23/2018  7:26 AM   Dressing Type Tape;Transparent 7/23/2018  7:26 AM   Hub Color/Line Status Infusing;Patent 7/23/2018  7:26 AM   Alcohol Cap Used No 7/23/2018  6:06 AM        Opportunity for questions and clarification was provided.       Patient transported with:   O2 @ 2 liters  Tech

## 2018-07-23 NOTE — H&P
[address] [facility]  Pre Operative History and Physical Exam    Patient ID:  Keara Reyes  [id number]  89 y.o.  1950    Today: July 23, 2018       Assessment:   1. Stiff right TKA        Plan:    1. Proceed with scheduled Procedure(s) (LRB):  RIGHT KNEE MANIPULATION (Right)            CC:  Right knee pain and stiffness    HPI:   The patient undergone a right TKA and one previous manipulation and still has residual stiffness in her knee. The patient was evaluated and examined during a consultation prior to this office visit. There have been no changes to the patient's orthopedic condition since the initial consultation. The necessity for repeat joint manipulation is present.  The patient will be admitted the day of surgery for Procedure(s) (LRB):  RIGHT KNEE MANIPULATION (Right)      Past Medical/Surgical History:  Past Medical History:   Diagnosis Date    Environmental and seasonal allergies     GERD (gastroesophageal reflux disease)     diet controlled     Nausea & vomiting     needs phenergan after    Neuropathy     bilateral lower extremities     Osteoporosis     Postoperative stiffness of total knee replacement ([address] 6/25/2018    Primary osteoarthritis of right knee     Status post total right knee replacement 4/13/2018     Past Surgical History:   Procedure Laterality Date    HX COLONOSCOPY  2012    HX KNEE REPLACEMENT Right 04/13/2018    HX TONSILLECTOMY  1962    HX TUBAL LIGATION  1978        Allergies: No Known Allergies     Physical Exam:   General: NAD, Alert, Oriented, Appears their stated age     [de-identified]: NC/AT, PERRL    Skin: No rashes, lesions or wounds seen      Psych: normal affect      Heart: Regular Rate, Rhythm     Lungs: unlabored respirations, normal breath sounds     Abdomen: Soft and non-distended     Ortho: Pain with limited ROM of the right knee  10-90 degrees    Neuro: no focal defects, sensation is equal bilaterally     Lymph: no lymphadenopathy     Meds: Current Facility-Administered Medications   Medication Dose Route Frequency    lidocaine (XYLOCAINE) 10 mg/mL (1 %) injection 0.1 mL  0.1 mL SubCUTAneous PRN    lactated Ringers infusion  75 mL/hr IntraVENous CONTINUOUS    fentaNYL citrate (PF) injection 100 mcg  100 mcg IntraVENous ONCE    ondansetron (ZOFRAN) injection 4 mg  4 mg IntraVENous ONCE         Labs:  No visits with results within 3 Month(s) from this visit. Latest known visit with results is:    Admission on 04/13/2018, Discharged on 04/16/2018   Component Date Value Ref Range Status    PPD 04/14/2018 Negative  Negative Preliminary    mm Induration 04/14/2018 0  mm Preliminary    mm Induration 04/15/2018 0  mm Final    0    mm Induration 04/16/2018 0  mm Final    0    Crossmatch Expiration 04/13/2018 04/16/2018   Final    ABO/Rh(D) 04/13/2018 O POSITIVE   Final    Antibody screen 04/13/2018 NEG   Final    Glucose (POC) 04/13/2018 85  65 - 100 mg/dL Final    Comment: 47 - 60 mg/dl Consistent with, but not fully diagnostic of hypoglycemia. 101 - 125 mg/dl Impaired fasting glucose/consistent with pre-diabetes mellitus  > 126 mg/dl Fasting glucose consistent with overt diabetes mellitus      HGB 04/13/2018 11.5* 11.7 - 15.4 g/dL Final    HGB 04/14/2018 10.9* 11.7 - 15.4 g/dL Final    Sodium 04/14/2018 140  136 - 145 mmol/L Final    Potassium 04/14/2018 4.4  3.5 - 5.1 mmol/L Final    Chloride 04/14/2018 106  98 - 107 mmol/L Final    CO2 04/14/2018 29  21 - 32 mmol/L Final    Anion gap 04/14/2018 5* 7 - 16 mmol/L Final    Glucose 04/14/2018 120* 65 - 100 mg/dL Final    Comment: 47 - 60 mg/dl Consistent with, but not fully diagnostic of hypoglycemia.   101 - 125 mg/dl Impaired fasting glucose/consistent with pre-diabetes mellitus  > 126 mg/dl Fasting glucose consistent with overt diabetes mellitus      BUN 04/14/2018 12  8 - 23 MG/DL Final    Creatinine 04/14/2018 0.68  0.6 - 1.0 MG/DL Final    GFR est AA 04/14/2018 >60  >60 ml/min/1.73m2 Final    GFR est non-AA 04/14/2018 >60  >60 ml/min/1.73m2 Final    Comment: (NOTE)  Estimated GFR is calculated using the Modification of Diet in Renal   Disease (MDRD) Study equation, reported for both  Americans   (GFRAA) and non- Americans (GFRNA), and normalized to 1.73m2   body surface area. The physician must decide which value applies to   the patient. The MDRD study equation should only be used in   individuals age 25 or older. It has not been validated for the   following: pregnant women, patients with serious comorbid conditions,   or on certain medications, or persons with extremes of body size,   muscle mass, or nutritional status.       Calcium 04/14/2018 8.7  8.3 - 10.4 MG/DL Final    HGB 04/15/2018 9.3* 11.7 - 15.4 g/dL Final    HGB 04/16/2018 9.9* 11.7 - 15.4 g/dL Final                 Patient Active Problem List   Diagnosis Code    Snoring R06.83    Status post total right knee replacement Z96.651    Postoperative stiffness of total knee replacement ([facility] [address] T84.89XA, M25.669, Z96.659    Status post surgical manipulation of knee joint Z98.890         Signed By: Aj Hirsch MD  July 23, 2018

## 2018-07-23 NOTE — PROGRESS NOTES
Admission Assessment Complete. Pt had a R knee manipulation today. Pt is A&Ox 3.  +2 pedal pulses with purposeful movement in all four extremities. . IVF inusing. Pt denies any pain or need at this time. Bed low and locked. Side rails x3. Call light with in reach. Pt verbalizes understanding of call light.

## 2018-07-24 ENCOUNTER — HOSPITAL ENCOUNTER (OUTPATIENT)
Dept: PHYSICAL THERAPY | Age: 68
Discharge: HOME | End: 2018-07-24
Payer: MEDICARE

## 2018-07-24 VITALS
RESPIRATION RATE: 18 BRPM | HEIGHT: 63 IN | TEMPERATURE: 98.4 F | BODY MASS INDEX: 22.61 KG/M2 | OXYGEN SATURATION: 99 % | DIASTOLIC BLOOD PRESSURE: 75 MMHG | WEIGHT: 127.6 LBS | HEART RATE: 79 BPM | SYSTOLIC BLOOD PRESSURE: 130 MMHG

## 2018-07-24 PROCEDURE — 99218 HC RM OBSERVATION: CPT

## 2018-07-24 PROCEDURE — 97140 MANUAL THERAPY 1/> REGIONS: CPT

## 2018-07-24 PROCEDURE — 74011250637 HC RX REV CODE- 250/637: Performed by: ORTHOPAEDIC SURGERY

## 2018-07-24 PROCEDURE — 97110 THERAPEUTIC EXERCISES: CPT

## 2018-07-24 RX ADMIN — HYDROMORPHONE HYDROCHLORIDE 2 MG: 2 TABLET ORAL at 04:52

## 2018-07-24 RX ADMIN — ASPIRIN 81 MG: 81 TABLET, COATED ORAL at 08:07

## 2018-07-24 RX ADMIN — HYDROMORPHONE HYDROCHLORIDE 2 MG: 2 TABLET ORAL at 09:17

## 2018-07-24 RX ADMIN — CELECOXIB 200 MG: 200 CAPSULE ORAL at 08:07

## 2018-07-24 NOTE — PROGRESS NOTES
2018         Post Op day: 1 Day Post-OpProcedure(s) (LRB):  RIGHT KNEE MANIPULATION (Right)      Admit Date: 2018  Admit Diagnosis: Stiffness of right knee [M25.661]    LAB:    No results found for this or any previous visit (from the past 24 hour(s)). Vital Signs:    Patient Vitals for the past 8 hrs:   BP Temp Pulse Resp SpO2   18 0302 130/78 98 °F (36.7 °C) 79 16 97 %   18 2259 130/73 98.6 °F (37 °C) 79 16 95 %     Temp (24hrs), Av °F (36.7 °C), Min:97.4 °F (36.3 °C), Max:98.6 °F (37 °C)    Body mass index is 22.6 kg/(m^2).   Pain Control:   Pain Assessment  Pain Scale 1: Visual  Pain Intensity 1: 3  Pain Onset 1: post op  Pain Location 1: Knee  Pain Orientation 1: Right  Pain Description 1: Aching  Pain Intervention(s) 1: Medication (see MAR)    Subjective: Doing well, No complaints, No SOB, No Chest Pain, No Nausea or Vomitting     Objective: Vital Signs are Stable, No Acute Distress, Alert and Oriented, Dressing is Dry,  Neurovascular exam is normal.       PT/OT:            Assistive Device: Walker (comment)                Weight Bearing Status: WBAT    Meds:  [unfilled]  [unfilled]  [unfilled]    Assessment:   Patient Active Problem List   Diagnosis Code    Snoring R06.83    Status post total right knee replacement Z96.651    Postoperative stiffness of total knee replacement ([facility]) T84.89XA, M25.669, Z96.659    Status post surgical manipulation of knee joint Z98.890    Stiffness of right knee M25.661             Plan: Continue Physical Therapy, Monitor  Hbg, D/C today, Scheduled for outpatient therapy today        Signed By: Lexie Allen NP

## 2018-07-24 NOTE — PROGRESS NOTES
Shift Assessment:    Patient resting quietly, alert and oriented, no distress noted.        Patient is numb in RLE, but able to plantar and dosi flex somewhat.      Pain 3/10.      Output: clear, yellow urine; BSC.      Patient encouraged to use incentive spirometer.        Fresh ice placed in iceman.        Neurovascular and peripheral vascular checks WNL        Bed low and locked position; bed alarm activated.       Call light within reach.        Patient instructed to call for assistance, verbalizes understanding.        Nursing assessment complete.

## 2018-07-24 NOTE — PROGRESS NOTES
Problem: Falls - Risk of  Goal: *Absence of Falls  Document Bruna Fall Risk and appropriate interventions in the flowsheet.    Outcome: Progressing Towards Goal  Fall Risk Interventions:  Mobility Interventions: OT consult for ADLs, Patient to call before getting OOB, PT Consult for mobility concerns, PT Consult for assist device competence, Strengthening exercises (ROM-active/passive), Utilize walker, cane, or other assistive device         Medication Interventions: Evaluate medications/consider consulting pharmacy, Patient to call before getting OOB, Teach patient to arise slowly    Elimination Interventions: Call light in reach, Elevated toilet seat, Patient to call for help with toileting needs, Toilet paper/wipes in reach, Toileting schedule/hourly rounds

## 2018-07-24 NOTE — PROGRESS NOTES
07/23/18 2055   Oxygen Therapy   O2 Sat (%) 98 %   Pulse via Oximetry 74 beats per minute   O2 Device Room air   pt refused c/s monitor. States she does not want another cord attached. Explained to pt the safety precautions.  Pt still refused

## 2018-07-24 NOTE — PROGRESS NOTES
Shift Assessment Complete. Pt is post op day 1 from Right knee manipulation. Pt is A&Ox 3.  +2 pedal pulses with purposeful movement in all four extremities. Dressing is clean, dry and intact. PIV capped. Pt denies any pain or need at this time. Bed low and locked. Side rails x3. Call light with in reach. Pt verbalizes understanding of call light.

## 2018-07-24 NOTE — DISCHARGE INSTRUCTIONS
[facility]   Patient Discharge Instructions    Hilaria Gilmore / [id number] : 1950    Admitted 2018 Discharged: 2018         IF YOU HAVE ANY PROBLEMS ONCE YOU ARE AT HOME CALL THE FOLLOWING NUMBERS:   Main office number: [phone]  Medications    · The medications you are to continue on are listed on the medication reconciliation sheet. · It is important that you take the medication exactly as they are prescribed. · Medications which increase your risk of blood clots are listed to stop for 5 weeks after surgery as well as medications or supplements which increase your risk of bleeding complications. · Keep your medication in the bottles provided by the pharmacist and keep a list of the medication names, dosages, and times to be taken in your wallet. · Do not take other medications without consulting your doctor. What to do at [address] [place], [place] [address]e Ave your prehospital diet. If you have excessive nausea or vomitting call your doctor's office     Continue Physical Therapy Exercises    Do not drive if taking narcotic pain medication       When to Call    - Call if you have a temperature greater then 101  - Are unable to bear any weight   - Need a pain medication refill   - The dressing becomes saturated         Information obtained by :  I understand that if any problems occur once I am at home I am to contact my physician. I understand and acknowledge receipt of the instructions indicated above.                                                                                                                                            Physician's or R.N.'s Signature                                                                  Date/Time                                                                                                                                              Patient or Representative Signature Date/Time

## 2018-07-24 NOTE — PROGRESS NOTES
Carrie Timmons : 1950 [place] / [place] / Product Type: Medicare /  [address]  at [address] 
[address], [address] [place] [address] [place], [address] [phone]   [phone] OUTPATIENT PHYSICAL THERAPY:Daily Note 2018 ICD-10: Treatment Diagnosis:  
M25.561 Pain in right knee  
   
M25.661 Stiffness of right knee, not elsewhere classified  
   
R26.2 Difficulty in walking, not elsewhere classified  
  
 
Precautions/Allergies:  
Review of patient's allergies indicates no known allergies. Fall Risk Score: 1 (? 5 = High Risk) MD Orders: Eval and treat  MEDICAL/REFERRING DIAGNOSIS: 
Presence of right artificial knee joint [Z96.651] DATE OF ONSET: 2018 REFERRING PHYSICIAN: Shari Bal., * RETURN PHYSICIAN APPOINTMENT: 4 weeks INITIAL ASSESSMENT:  Ms. Sana Vazquez presents PROBLEM LIST (Impacting functional limitations): 1. Decreased Strength 2. Decreased ADL/Functional Activities 3. Decreased Ambulation Ability/Technique 4. Increased Pain 5. Decreased Flexibility/Joint Mobility 6. Edema/Girth INTERVENTIONS PLANNED: 
1. Gait Training 2. Home Exercise Program (HEP) 3. Manual Therapy 4. Range of Motion (ROM) 5. Therapeutic Activites 6. Therapeutic Exercise/Strengthening TREATMENT PLAN: 
Effective Dates: 2018 TO 2018 (90 days). Frequency/Duration: 2 times a week for 90and upon reassessment, will adjust frequency and duration as progress indicates. GOALS: (Goals have been discussed and agreed upon with patient.) Short-Term Functional Goals: Time Frame: 4 weeks 1. Establish independent HEP with no cueing. Discharge Goals: Time Frame: 12 weeks 1. Improve score on Rehabilitation Potential For Stated Goals: Good Regarding [employer] therapy, I certify that the treatment plan above will be carried out by a therapist or under their direction.  
Thank you for this referral, Fransisco Haque PT Referring Physician Signature: Surekha Mcknight., *              Date The information in this section was collected on 6/26/18(except where otherwise noted). HISTORY:  
History of Present Injury/Illness (Reason for Referral): 
 
Past Medical History/Comorbidities: Ms. Germania Ellis  has a past medical history of Environmental and seasonal allergies; GERD (gastroesophageal reflux disease); Nausea & vomiting; Neuropathy; Osteoporosis; Postoperative stiffness of total knee replacement ([facility] [address] (6/25/2018); Primary osteoarthritis of right knee; and Status post total right knee replacement (4/13/2018). Ms. Germania Ellis  has a past surgical history that includes hx tonsillectomy (1962); hx colonoscopy (2012); hx tubal ligation (1978); and hx knee replacement (Right, 04/13/2018). Social History/Living Environment:  
  Lives with spouse Prior Level of Function/Work/Activity: 
Retired but works part time cleaning homes Dominant Side:  
      RIGHT Current Medications:   
  
Current Outpatient Prescriptions:  
  cetirizine (ZYRTEC) 10 mg tablet, Take 10 mg by mouth daily. , Disp: , Rfl:  
  traMADol (ULTRAM) 50 mg tablet, Take 50 mg by mouth every six (6) hours as needed for Pain. Take / use AM day of surgery  per anesthesia protocols prn, Disp: , Rfl:  
  aspirin delayed-release 81 mg tablet, Take 81 mg by mouth daily. , Disp: , Rfl:  
  calcium-vitamin D (OYSTER SHELL) 500 mg(1,250mg) -200 unit per tablet, Take 1 Tab by mouth daily. , Disp: , Rfl:  
No current facility-administered medications for this encounter. Date Last Reviewed:  7/24/2018 Number of Personal Factors/Comorbidities that affect the Plan of Care: 1-2: MODERATE COMPLEXITY EXAMINATION:  
Observation/Orthostatic Postural Assessment:   
       
Palpation:   
       
Functional Mobility:  
      Gait/Ambulation:  Independent with cane Stairs:  Difficulty with stairs ROM:  
   
  
  
  
  
  
  
 Strength:  
   
  
     
  
   
  
Special Tests:  
Neurological Screen:  
Balance: Body Structures Involved: 1. Bones 2. Joints 3. Muscles 4. Ligaments Body Functions Affected: 1. Sensory/Pain 2. Neuromusculoskeletal 
3. Movement Related Activities and Participation Affected: 1. Learning and Applying Knowledge 2. General Tasks and Demands 3. Mobility 4. Domestic Life 5. Community, Social and [place] Number of elements (examined above) that affect the Plan of Care: 3: MODERATE COMPLEXITY CLINICAL PRESENTATION:  
Presentation: Evolving clinical presentation with changing clinical characteristics: MODERATE COMPLEXITY CLINICAL DECISION MAKING:  
Outcome Measure: Tool Used: Lower Extremity Functional Scale (LEFS) Score:  Initial: 33/80 Most Recent: X/80 (Date: -- ) Interpretation of Score: 20 questions each scored on a 5 point scale with 0 representing \"extreme difficulty or unable to perform\" and 4 representing \"no difficulty\". The lower the score, the greater the functional disability. 80/80 represents no disability. Minimal detectable change is 9 points. Score 80 79-63 62-48 47-32 31-16 15-1 0 Modifier CH CI CJ CK CL CM CN  
 
? Mobility - Walking and Moving Around:  
  - CURRENT STATUS: CK - 40%-59% impaired, limited or restricted  - GOAL STATUS: CJ - 20%-39% impaired, limited or restricted  - D/C STATUS:  ---------------To be determined--------------- Medical Necessity:  
· Patient is expected to demonstrate progress in strength, range of motion and gait to increase independence with home and community activities. Reason for Services/Other Comments: 
· Patient continues to require skilled intervention due to decreased ROM and strength of R knee and LE. Use of outcome tool(s) and clinical judgement create a POC that gives a: Clear prediction of patient's progress: LOW COMPLEXITY  
  
 
 
 
TREATMENT:  
(In addition to Assessment/Re-Assessment sessions the following treatments were rendered) Pre-treatment Symptoms/Complaints:  Pain and stiffness of R knee. To have another manipulation on Monday. Will see Tues. Pain: Initial:  
Pain Intensity 1: 5  Post Session:  2 THERAPEUTIC EXERCISE: (10 minutes):  Exercises per grid below to improve mobility, strength and gait. Required moderate verbal and manual cues to promote proper body alignment, promote proper body posture and promote proper body mechanics. Progressed resistance, range and repetitions as indicated. Manual Therapy ( 25 min ): Patella and patella tendon mobs. Soft tissue work to [employer] and HS. PROM for knee flex and extn. Therapeutic Modalities: HEP: As above; handouts given to patient for all exercises. [employer] 
 
Treatment/Session Assessment:   
· Response to Treatment:  . · Compliance with Program/Exercises: Will assess as treatment progresses. · Recommendations/Intent for next treatment session: \"Next visit will focus on aggressive ROM and strengthening of R knee and LE\". Total Treatment Duration: PT Patient Time In/Time Out Time In: 1635 Time Out: 1240 Treatment number  8 Sabine Marie, PT

## 2018-07-25 ENCOUNTER — HOSPITAL ENCOUNTER (OUTPATIENT)
Dept: PHYSICAL THERAPY | Age: 68
Discharge: HOME | End: 2018-07-25
Payer: MEDICARE

## 2018-07-25 PROCEDURE — G8979 MOBILITY GOAL STATUS: HCPCS

## 2018-07-25 PROCEDURE — 97140 MANUAL THERAPY 1/> REGIONS: CPT

## 2018-07-25 PROCEDURE — G8978 MOBILITY CURRENT STATUS: HCPCS

## 2018-07-25 PROCEDURE — 97110 THERAPEUTIC EXERCISES: CPT

## 2018-07-26 NOTE — PROGRESS NOTES
Hilaria Gilmore  : 1950  [place] / [place] / Product Type: Medicare /  [address]  at [address], [facility] [address]  [phone]   [phone]         OUTPATIENT PHYSICAL THERAPY:Daily Note 2018    ICD-10: Treatment Diagnosis:   M25.561 Pain in right knee       M25.661 Stiffness of right knee, not elsewhere classified       R26.2 Difficulty in walking, not elsewhere classified        Precautions/Allergies:   Review of patient's allergies indicates no known allergies. Fall Risk Score: 1 (? 5 = High Risk)  MD Orders: Eval and treat  MEDICAL/REFERRING DIAGNOSIS:  Presence of right artificial knee joint [Z96.651]   DATE OF ONSET: 2018  REFERRING PHYSICIAN: Courtney Claire, *  RETURN PHYSICIAN APPOINTMENT: 4 weeks     INITIAL ASSESSMENT:  Ms. Negin Grover presents     PROBLEM LIST (Impacting functional limitations):  1. Decreased Strength  2. Decreased ADL/Functional Activities  3. Decreased Ambulation Ability/Technique  4. Increased Pain  5. Decreased Flexibility/Joint Mobility  6. Edema/Girth INTERVENTIONS PLANNED:  1. Gait Training  2. Home Exercise Program (HEP)  3. Manual Therapy  4. Range of Motion (ROM)  5. Therapeutic Activites  6. Therapeutic Exercise/Strengthening   TREATMENT PLAN:  Effective Dates: 2018 TO 2018 (90 days). Frequency/Duration: 2 times a week for 90and upon reassessment, will adjust frequency and duration as progress indicates. GOALS: (Goals have been discussed and agreed upon with patient.)  Short-Term Functional Goals: Time Frame: 4 weeks   1. Establish independent HEP with no cueing. Discharge Goals: Time Frame: 12 weeks  1. Improve score on  Rehabilitation Potential For Stated Goals: Good  Regarding Dago donovan, I certify that the treatment plan above will be carried out by a therapist or under their direction.   Thank you for this referral,  Joanne Blunt, KAITLYN     Referring Physician Signature: Vinicio Chico., *              Date                    The information in this section was collected on 6/26/18(except where otherwise noted). HISTORY:   History of Present Injury/Illness (Reason for Referral):    Past Medical History/Comorbidities:   Ms. Jl Torres  has a past medical history of Environmental and seasonal allergies; GERD (gastroesophageal reflux disease); Nausea & vomiting; Neuropathy; Osteoporosis; Postoperative stiffness of total knee replacement ([facility] [address] (6/25/2018); Primary osteoarthritis of right knee; and Status post total right knee replacement (4/13/2018). Ms. Jl Torres  has a past surgical history that includes hx tonsillectomy (1962); hx colonoscopy (2012); hx tubal ligation (1978); and hx knee replacement (Right, 04/13/2018). Social History/Living Environment:     Lives with spouse   Prior Level of Function/Work/Activity:  Retired but works part time cleaning homes   Dominant Side:         RIGHT  Current Medications:       Current Outpatient Prescriptions:     cetirizine (ZYRTEC) 10 mg tablet, Take 10 mg by mouth daily. , Disp: , Rfl:     traMADol (ULTRAM) 50 mg tablet, Take 50 mg by mouth every six (6) hours as needed for Pain. Take / use AM day of surgery  per anesthesia protocols prn, Disp: , Rfl:     aspirin delayed-release 81 mg tablet, Take 81 mg by mouth daily. , Disp: , Rfl:     calcium-vitamin D (OYSTER SHELL) 500 mg(1,250mg) -200 unit per tablet, Take 1 Tab by mouth daily. , Disp: , Rfl:    Date Last Reviewed:  7/25/2018   Number of Personal Factors/Comorbidities that affect the Plan of Care: 1-2: MODERATE COMPLEXITY   EXAMINATION:   Observation/Orthostatic Postural Assessment:            Palpation:            Functional Mobility:         Gait/Ambulation:  Independent with cane        Stairs:  Difficulty with stairs   ROM:                                            Strength:                          Special Tests:   Neurological Screen:   Balance: Body Structures Involved:  1. Bones  2. Joints  3. Muscles  4. Ligaments Body Functions Affected:  1. Sensory/Pain  2. Neuromusculoskeletal  3. Movement Related Activities and Participation Affected:  1. Learning and Applying Knowledge  2. General Tasks and Demands  3. Mobility  4. Domestic Life  5. Community, Social and [place]   Number of elements (examined above) that affect the Plan of Care: 3: MODERATE COMPLEXITY   CLINICAL PRESENTATION:   Presentation: Evolving clinical presentation with changing clinical characteristics: MODERATE COMPLEXITY   CLINICAL DECISION MAKING:   Outcome Measure: Tool Used: Lower Extremity Functional Scale (LEFS)  Score:  Initial: 33/80 Most Recent: 35/80 (Date: 7/25/18 )   Interpretation of Score: 20 questions each scored on a 5 point scale with 0 representing \"extreme difficulty or unable to perform\" and 4 representing \"no difficulty\". The lower the score, the greater the functional disability. 80/80 represents no disability. Minimal detectable change is 9 points. Score 80 79-63 62-48 47-32 31-16 15-1 0   Modifier CH CI CJ CK CL CM CN     ? Mobility - Walking and Moving Around:     - CURRENT STATUS: CK - 40%-59% impaired, limited or restricted    - GOAL STATUS: CJ - 20%-39% impaired, limited or restricted    - D/C STATUS:  ---------------To be determined---------------    Medical Necessity:   · Patient is expected to demonstrate progress in strength, range of motion and gait to increase independence with home and community activities. Reason for Services/Other Comments:  · Patient continues to require skilled intervention due to decreased ROM and strength of R knee and LE.    Use of outcome tool(s) and clinical judgement create a POC that gives a: Clear prediction of patient's progress: LOW COMPLEXITY            TREATMENT:   (In addition to Assessment/Re-Assessment sessions the following treatments were rendered)  Pre-treatment Symptoms/Complaints:  Pain and stiffness of R knee. To have another manipulation on Monday. Will see Tues. Pain: Initial:   Pain Intensity 1: 5  Post Session:  3     THERAPEUTIC EXERCISE: (20 minutes):  Exercises per grid below to improve mobility, strength and gait. Required moderate verbal and manual cues to promote proper body alignment, promote proper body posture and promote proper body mechanics. Progressed resistance, range and repetitions as indicated. Manual Therapy ( 30 min ): Patella and patella tendon mobs. Soft tissue work to [employer] and HS. PROM for knee flex and extn. Therapeutic Modalities:    HEP: As above; handouts given to patient for all exercises. [employer] Portal    Treatment/Session Assessment:    · Response to Treatment:  . · Compliance with Program/Exercises: Will assess as treatment progresses. · Recommendations/Intent for next treatment session: \"Next visit will focus on aggressive ROM and strengthening of R knee and LE\".   Total Treatment Duration:  PT Patient Time In/Time Out  Time In: 0500  Time Out: 3343  Treatment number  [address] [facility],

## 2018-07-27 ENCOUNTER — HOSPITAL ENCOUNTER (OUTPATIENT)
Dept: PHYSICAL THERAPY | Age: 68
Discharge: HOME | End: 2018-07-27
Payer: MEDICARE

## 2018-07-27 PROCEDURE — 97110 THERAPEUTIC EXERCISES: CPT

## 2018-07-27 PROCEDURE — 97140 MANUAL THERAPY 1/> REGIONS: CPT

## 2018-07-27 NOTE — PROGRESS NOTES
Giovani Bessy  : 1950  [place] / [place] / Product Type: Medicare /  [address]  at [place] [address] Therapy  [address] [place] [address], [facility] [address]  [phone]   [phone]         OUTPATIENT PHYSICAL THERAPY:Daily Note 2018    ICD-10: Treatment Diagnosis:   M25.561 Pain in right knee       M25.661 Stiffness of right knee, not elsewhere classified       R26.2 Difficulty in walking, not elsewhere classified        Precautions/Allergies:   Review of patient's allergies indicates no known allergies. Fall Risk Score: 1 (? 5 = High Risk)  MD Orders: Eval and treat  MEDICAL/REFERRING DIAGNOSIS:  Presence of right artificial knee joint [Z96.651]   DATE OF ONSET: 2018  REFERRING PHYSICIAN: Parmjit Swan., *  RETURN PHYSICIAN APPOINTMENT: 4 weeks     INITIAL ASSESSMENT:  Ms. Paola Hanley presents     PROBLEM LIST (Impacting functional limitations):  1. Decreased Strength  2. Decreased ADL/Functional Activities  3. Decreased Ambulation Ability/Technique  4. Increased Pain  5. Decreased Flexibility/Joint Mobility  6. Edema/Girth INTERVENTIONS PLANNED:  1. Gait Training  2. Home Exercise Program (HEP)  3. Manual Therapy  4. Range of Motion (ROM)  5. Therapeutic Activites  6. Therapeutic Exercise/Strengthening   TREATMENT PLAN:  Effective Dates: 2018 TO 2018 (90 days). Frequency/Duration: 2 times a week for 90and upon reassessment, will adjust frequency and duration as progress indicates. GOALS: (Goals have been discussed and agreed upon with patient.)  Short-Term Functional Goals: Time Frame: 4 weeks   1. Establish independent HEP with no cueing. Discharge Goals: Time Frame: 12 weeks  1. Improve score on  Rehabilitation Potential For Stated Goals: Good  Regarding [facility] therapy, I certify that the treatment plan above will be carried out by a therapist or under their direction.   Thank you for this referral,  Joanne Alvarado PT     Referring Physician Signature: Angel Salinas., *              Date                    The information in this section was collected on 6/26/18(except where otherwise noted). HISTORY:   History of Present Injury/Illness (Reason for Referral):    Past Medical History/Comorbidities:   Ms. Dolly George  has a past medical history of Environmental and seasonal allergies; GERD (gastroesophageal reflux disease); Nausea & vomiting; Neuropathy; Osteoporosis; Postoperative stiffness of total knee replacement ([facility] [address] (6/25/2018); Primary osteoarthritis of right knee; and Status post total right knee replacement (4/13/2018). Ms. Dolly George  has a past surgical history that includes hx tonsillectomy (1962); hx colonoscopy (2012); hx tubal ligation (1978); and hx knee replacement (Right, 04/13/2018). Social History/Living Environment:     Lives with spouse   Prior Level of Function/Work/Activity:  Retired but works part time cleaning homes   Dominant Side:         RIGHT  Current Medications:       Current Outpatient Prescriptions:     cetirizine (ZYRTEC) 10 mg tablet, Take 10 mg by mouth daily. , Disp: , Rfl:     traMADol (ULTRAM) 50 mg tablet, Take 50 mg by mouth every six (6) hours as needed for Pain. Take / use AM day of surgery  per anesthesia protocols prn, Disp: , Rfl:     aspirin delayed-release 81 mg tablet, Take 81 mg by mouth daily. , Disp: , Rfl:     calcium-vitamin D (OYSTER SHELL) 500 mg(1,250mg) -200 unit per tablet, Take 1 Tab by mouth daily. , Disp: , Rfl:    Date Last Reviewed:  7/27/2018   Number of Personal Factors/Comorbidities that affect the Plan of Care: 1-2: MODERATE COMPLEXITY   EXAMINATION:   Observation/Orthostatic Postural Assessment:            Palpation:            Functional Mobility:         Gait/Ambulation:  Independent with cane        Stairs:  Difficulty with stairs   ROM:                                            Strength:                          Special Tests:   Neurological Screen:   Balance: Body Structures Involved:  1. Bones  2. Joints  3. Muscles  4. Ligaments Body Functions Affected:  1. Sensory/Pain  2. Neuromusculoskeletal  3. Movement Related Activities and Participation Affected:  1. Learning and Applying Knowledge  2. General Tasks and Demands  3. Mobility  4. Domestic Life  5. Community, Social and [facility]   Number of elements (examined above) that affect the Plan of Care: 3: MODERATE COMPLEXITY   CLINICAL PRESENTATION:   Presentation: Evolving clinical presentation with changing clinical characteristics: MODERATE COMPLEXITY   CLINICAL DECISION MAKING:   Outcome Measure: Tool Used: Lower Extremity Functional Scale (LEFS)  Score:  Initial: 33/80 Most Recent: 35/80 (Date: 7/25/18 )   Interpretation of Score: 20 questions each scored on a 5 point scale with 0 representing \"extreme difficulty or unable to perform\" and 4 representing \"no difficulty\". The lower the score, the greater the functional disability. 80/80 represents no disability. Minimal detectable change is 9 points. Score 80 79-63 62-48 47-32 31-16 15-1 0   Modifier CH CI CJ CK CL CM CN     ? Mobility - Walking and Moving Around:     - CURRENT STATUS: CK - 40%-59% impaired, limited or restricted    - GOAL STATUS: CJ - 20%-39% impaired, limited or restricted    - D/C STATUS:  ---------------To be determined---------------    Medical Necessity:   · Patient is expected to demonstrate progress in strength, range of motion and gait to increase independence with home and community activities. Reason for Services/Other Comments:  · Patient continues to require skilled intervention due to decreased ROM and strength of R knee and LE.    Use of outcome tool(s) and clinical judgement create a POC that gives a: Clear prediction of patient's progress: LOW COMPLEXITY            TREATMENT:   (In addition to Assessment/Re-Assessment sessions the following treatments were rendered)  Pre-treatment Symptoms/Complaints:  Pain and stiffness of R knee. To have another manipulation on Monday. Will see Tues. Pain: Initial:   Pain Intensity 1: 5  Post Session:  3     THERAPEUTIC EXERCISE: (20 minutes):  Exercises per grid below to improve mobility, strength and gait. Required moderate verbal and manual cues to promote proper body alignment, promote proper body posture and promote proper body mechanics. Progressed resistance, range and repetitions as indicated. Manual Therapy ( 30 min ): Patella and patella tendon mobs. Soft tissue work to [employer] and HS. PROM for knee flex and extn. Therapeutic Modalities:    HEP: As above; handouts given to patient for all exercises. [employer]    Treatment/Session Assessment:    · Response to Treatment:  . · Compliance with Program/Exercises: Will assess as treatment progresses. · Recommendations/Intent for next treatment session: \"Next visit will focus on aggressive ROM and strengthening of R knee and LE\".   Total Treatment Duration:  PT Patient Time In/Time Out  Time In: 1000  Time Out: 1050  Treatment number  [id number] Tirso Cao, KA[place]LYN

## 2018-08-01 ENCOUNTER — HOSPITAL ENCOUNTER (OUTPATIENT)
Dept: PHYSICAL THERAPY | Age: 68
Discharge: HOME | End: 2018-08-01
Payer: MEDICARE

## 2018-08-01 PROCEDURE — 97140 MANUAL THERAPY 1/> REGIONS: CPT

## 2018-08-01 PROCEDURE — 97110 THERAPEUTIC EXERCISES: CPT

## 2018-08-02 NOTE — PROGRESS NOTES
Carrie Timmons : 1950 [place] / Product Type: Medicare /  [address]  at [address] 
[address], [address] [place] [address] [place], [address] [phone]   [phone] OUTPATIENT PHYSICAL THERAPY:Daily Note 2018 ICD-10: Treatment Diagnosis:  
M25.561 Pain in right knee  
   
M25.661 Stiffness of right knee, not elsewhere classified  
   
R26.2 Difficulty in walking, not elsewhere classified  
  
 
Precautions/Allergies:  
Review of patient's allergies indicates no known allergies. Fall Risk Score: 1 (? 5 = High Risk) MD Orders: Eval and treat  MEDICAL/REFERRING DIAGNOSIS: 
Presence of right artificial knee joint [Z96.651] DATE OF ONSET: 2018 REFERRING PHYSICIAN: Shari Bal., * RETURN PHYSICIAN APPOINTMENT: 4 weeks INITIAL ASSESSMENT:  Ms. Sana Vazquez presents PROBLEM LIST (Impacting functional limitations): 1. Decreased Strength 2. Decreased ADL/Functional Activities 3. Decreased Ambulation Ability/Technique 4. Increased Pain 5. Decreased Flexibility/Joint Mobility 6. Edema/Girth INTERVENTIONS PLANNED: 
1. Gait Training 2. Home Exercise Program (HEP) 3. Manual Therapy 4. Range of Motion (ROM) 5. Therapeutic Activites 6. Therapeutic Exercise/Strengthening TREATMENT PLAN: 
Effective Dates: 2018 TO 2018 (90 days). Frequency/Duration: 2 times a week for 90and upon reassessment, will adjust frequency and duration as progress indicates. GOALS: (Goals have been discussed and agreed upon with patient.) Short-Term Functional Goals: Time Frame: 4 weeks 1. Establish independent HEP with no cueing. Discharge Goals: Time Frame: 12 weeks 1. Improve score on Rehabilitation Potential For Stated Goals: Good Regarding [facility] therapy, I certify that the treatment plan above will be carried out by a therapist or under their direction.  
Thank you for this referral, Irene Nowak PT Referring Physician Signature: Farideh Omar, *              Date The information in this section was collected on 6/26/18(except where otherwise noted). HISTORY:  
History of Present Injury/Illness (Reason for Referral): 
 
Past Medical History/Comorbidities: Ms. Gayle Leo  has a past medical history of Environmental and seasonal allergies; GERD (gastroesophageal reflux disease); Nausea & vomiting; Neuropathy; Osteoporosis; Postoperative stiffness of total knee replacement ([facility] [address] (6/25/2018); Primary osteoarthritis of right knee; and Status post total right knee replacement (4/13/2018). Ms. Gayle Leo  has a past surgical history that includes hx tonsillectomy (1962); hx colonoscopy (2012); hx tubal ligation (1978); and hx knee replacement (Right, 04/13/2018). Social History/Living Environment:  
  Lives with spouse Prior Level of Function/Work/Activity: 
Retired but works part time cleaning homes Dominant Side:  
      RIGHT Current Medications:   
  
Current Outpatient Prescriptions:  
  cetirizine (ZYRTEC) 10 mg tablet, Take 10 mg by mouth daily. , Disp: , Rfl:  
  traMADol (ULTRAM) 50 mg tablet, Take 50 mg by mouth every six (6) hours as needed for Pain. Take / use AM day of surgery  per anesthesia protocols prn, Disp: , Rfl:  
  aspirin delayed-release 81 mg tablet, Take 81 mg by mouth daily. , Disp: , Rfl:  
  calcium-vitamin D (OYSTER SHELL) 500 mg(1,250mg) -200 unit per tablet, Take 1 Tab by mouth daily. , Disp: , Rfl:   
Date Last Reviewed:  8/1/2018 Number of Personal Factors/Comorbidities that affect the Plan of Care: 1-2: MODERATE COMPLEXITY EXAMINATION:  
Observation/Orthostatic Postural Assessment:   
       
Palpation:   
       
Functional Mobility:  
      Gait/Ambulation:  Independent with cane Stairs:  Difficulty with stairs ROM:  
   
  
  
  
  
  
  
  
    
   
   
  
Strength:  
   
  
     
  
   
  
Special Tests: Neurological Screen:  
Balance: Body Structures Involved: 1. Bones 2. Joints 3. Muscles 4. Ligaments Body Functions Affected: 1. Sensory/Pain 2. Neuromusculoskeletal 
3. Movement Related Activities and Participation Affected: 1. Learning and Applying Knowledge 2. General Tasks and Demands 3. Mobility 4. Domestic Life 5. Community, Social and [place] Number of elements (examined above) that affect the Plan of Care: 3: MODERATE COMPLEXITY CLINICAL PRESENTATION:  
Presentation: Evolving clinical presentation with changing clinical characteristics: MODERATE COMPLEXITY CLINICAL DECISION MAKING:  
Outcome Measure: Tool Used: Lower Extremity Functional Scale (LEFS) Score:  Initial: 33/80 Most Recent: 35/80 (Date: 7/25/18 ) Interpretation of Score: 20 questions each scored on a 5 point scale with 0 representing \"extreme difficulty or unable to perform\" and 4 representing \"no difficulty\". The lower the score, the greater the functional disability. 80/80 represents no disability. Minimal detectable change is 9 points. Score 80 79-63 62-48 47-32 31-16 15-1 0 Modifier CH CI CJ CK CL CM CN  
 
? Mobility - Walking and Moving Around:  
  - CURRENT STATUS: CK - 40%-59% impaired, limited or restricted  - GOAL STATUS: CJ - 20%-39% impaired, limited or restricted  - D/C STATUS:  ---------------To be determined--------------- Medical Necessity:  
· Patient is expected to demonstrate progress in strength, range of motion and gait to increase independence with home and community activities. Reason for Services/Other Comments: 
· Patient continues to require skilled intervention due to decreased ROM and strength of R knee and LE. Use of outcome tool(s) and clinical judgement create a POC that gives a: Clear prediction of patient's progress: LOW COMPLEXITY  
  
 
 
 
TREATMENT:  
(In addition to Assessment/Re-Assessment sessions the following treatments were rendered) Pre-treatment Symptoms/Complaints:  Pain and stiffness of R knee. To have another manipulation on Monday. Will see Tues. Pain: Initial:  
Pain Intensity 1: 6  Post Session:  3  
 
THERAPEUTIC EXERCISE: (15 minutes):  Exercises per grid below to improve mobility, strength and gait. Required moderate verbal and manual cues to promote proper body alignment, promote proper body posture and promote proper body mechanics. Progressed resistance, range and repetitions as indicated. Manual Therapy ( 20 min ): Patella and patella tendon mobs. Soft tissue work to [employer] and HS. PROM for knee flex and extn. Therapeutic Modalities: HEP: As above; handouts given to patient for all exercises. [employer] 
 
Treatment/Session Assessment:   
· Response to Treatment:  . · Compliance with Program/Exercises: Will assess as treatment progresses. · Recommendations/Intent for next treatment session: \"Next visit will focus on aggressive ROM and strengthening of R knee and LE\". Total Treatment Duration: PT Patient Time In/Time Out Time In: 80 Time Out: 9691 Treatment number  12 Abdirizak Aranda PT

## 2018-08-03 ENCOUNTER — HOSPITAL ENCOUNTER (OUTPATIENT)
Dept: PHYSICAL THERAPY | Age: 68
Discharge: HOME | End: 2018-08-03
Payer: MEDICARE

## 2018-08-07 ENCOUNTER — APPOINTMENT (OUTPATIENT)
Dept: PHYSICAL THERAPY | Age: 68
End: 2018-08-07
Payer: MEDICARE

## 2018-08-10 ENCOUNTER — APPOINTMENT (OUTPATIENT)
Dept: PHYSICAL THERAPY | Age: 68
End: 2018-08-10
Payer: MEDICARE

## 2018-08-14 ENCOUNTER — APPOINTMENT (OUTPATIENT)
Dept: PHYSICAL THERAPY | Age: 68
End: 2018-08-14
Payer: MEDICARE

## 2018-08-17 ENCOUNTER — APPOINTMENT (OUTPATIENT)
Dept: PHYSICAL THERAPY | Age: 68
End: 2018-08-17
Payer: MEDICARE

## (undated) DEVICE — DRAPE,TOP,102X53,STERILE: Brand: MEDLINE

## (undated) DEVICE — (D)PREP SKN CHLRAPRP APPL 26ML -- CONVERT TO ITEM 371833

## (undated) DEVICE — SUT ETHBND 2 30IN LR DA GRN --

## (undated) DEVICE — SYR 50ML LR LCK 1ML GRAD NSAF --

## (undated) DEVICE — BIPOLAR SEALER 23-112-1 AQM 6.0: Brand: AQUAMANTYS ®

## (undated) DEVICE — SUTURE PDS II SZ 1 L96IN ABSRB VLT TP-1 L65MM 1/2 CIR Z880G

## (undated) DEVICE — TRAY PREP DRY W/ PREM GLV 2 APPL 6 SPNG 2 UNDPD 1 OVERWRAP

## (undated) DEVICE — MEDI-VAC YANKAUER SUCTION HANDLE W/BULBOUS TIP: Brand: CARDINAL HEALTH

## (undated) DEVICE — REM POLYHESIVE ADULT PATIENT RETURN ELECTRODE: Brand: VALLEYLAB

## (undated) DEVICE — PRECISION FALCON OSCILLATING TIP SAW CARTRIDGE: Brand: PRECISION FALCON

## (undated) DEVICE — SYR LR LCK 1ML GRAD NSAF 30ML --

## (undated) DEVICE — 3000CC GUARDIAN II: Brand: GUARDIAN

## (undated) DEVICE — 3M™ STERI-DRAPE™ INSTRUMENT POUCH 1018: Brand: STERI-DRAPE™

## (undated) DEVICE — Z DISCONTINUED USE 2744636  DRESSING AQUACEL 14 IN ALG W3.5XL14IN POLYUR FLM CVR W/ HYDRCOLL

## (undated) DEVICE — SOLUTION IV DEXTROSE/SALINE 5%-0.9% 500ML - 500ML

## (undated) DEVICE — SUTURE ABSRB X-1 REV CUT 1/2 CIR 22MM UD BRAID 27IN SZ 3-0 J458H

## (undated) DEVICE — SOLUTION IRRIG 3000ML 0.9% SOD CHL FLX CONT 0797208] ICU MEDICAL INC]

## (undated) DEVICE — T4 HOOD

## (undated) DEVICE — X-LARGE COTTON GLOVE: Brand: DEROYAL

## (undated) DEVICE — TRAY CATH 16F DRN BG LTX -- CONVERT TO ITEM 363158

## (undated) DEVICE — SYR 10ML LUER LOK 1/5ML GRAD --

## (undated) DEVICE — SKIN STAPLER: Brand: SIGNET

## (undated) DEVICE — FAN SPRAY KIT: Brand: PULSAVAC®

## (undated) DEVICE — SUTURE STRATAFIX SYMMETRIC PDS + SZ 2-0 L18IN ABSRB VLT SXPP1A403

## (undated) DEVICE — SUTURE VCRL SZ 1 L27IN ABSRB UD L36MM CP-1 1/2 CIR REV CUT J268H

## (undated) DEVICE — PACK PROCEDURE SURG TOT KNEE

## (undated) DEVICE — BUTTON SWITCH PENCIL BLADE ELECTRODE, HOLSTER: Brand: EDGE

## (undated) DEVICE — BANDAGE COMPR SELF ADH 5 YDX4 IN TAN STRL PREMIERPRO LF

## (undated) DEVICE — 2000CC GUARDIAN II: Brand: GUARDIAN

## (undated) DEVICE — SOLUTION IV 1000ML 0.9% SOD CHL

## (undated) DEVICE — CURETTE BNE CEM 10IN DISP --

## (undated) DEVICE — Device

## (undated) DEVICE — SYRINGE CATH TIP 50ML

## (undated) DEVICE — BLADE SAW PAT RMR PILT H 41MM --

## (undated) DEVICE — KIT PREP FEM CRUCE SZ 4